# Patient Record
Sex: FEMALE | Race: WHITE | NOT HISPANIC OR LATINO | Employment: FULL TIME | URBAN - METROPOLITAN AREA
[De-identification: names, ages, dates, MRNs, and addresses within clinical notes are randomized per-mention and may not be internally consistent; named-entity substitution may affect disease eponyms.]

---

## 2018-09-27 NOTE — PROGRESS NOTES
"CHIEF COMPLAINT: Type 2 Diabetes     HPI: Mrs. Rayne Aaron is a 53 y.o. female who was diagnosed with Type 2 DM in 2008. Last seen by SCOTT Evans DNP in 2016.  Pt is being seen by me for the first time.    3 deaths in the past few years, stressed, works a lot-wakes up at 5a.  , has children.     Lab Results   Component Value Date    HGBA1C 8.4 (H) 03/30/2016                   PREVIOUS DIABETES MEDICATIONS TRIED  Metformin  levemir or lantus vial  invokana SEs     CURRENT DIABETIC MEDS: metformin 1000 mg bid     Pt is monitoring blood glucose readings 4 times a day.  Needs >100 strips per month related to fluctuations with blood glucose reading, a1c trends, and activity level.    Last Podiatry Exam: > 1 year    REVIEW OF SYSTEMS  General: no weakness, fatigue, or weight changes.   Eyes: no double or blurred vision, eye pain, or redness; Last Eye Exam=x 2 y ears ago. +vertigo  Cardiovascular: no chest pain, palpitations, edema, or murmurs.   Respiratory: no cough or dyspnea.   GI: no heartburn, nausea, or changes in bowel patterns; good appetite.   Skin: no rashes, dryness, itching, or reactions at insulin injection sites.  Neuro: no numbness, tingling, tremors, or vertigo.   Endocrine: no polyuria, polydipsia, polyphagia, heat or cold intolerance.     Vital Signs  /72 (BP Location: Right arm, Patient Position: Sitting, BP Method: Medium (Manual))   Pulse 71   Ht 5' 3" (1.6 m)   Wt 86.9 kg (191 lb 9.3 oz)   LMP 09/08/2015 (Approximate)   BMI 33.94 kg/m²     Hemoglobin A1C   Date Value Ref Range Status   03/30/2016 8.4 (H) 4.5 - 6.2 % Final   09/11/2015 9.5 (H) 4.5 - 6.2 % Final        Chemistry        Component Value Date/Time     05/18/2016 0700    K 4.4 05/18/2016 0700     05/18/2016 0700    CO2 26 05/18/2016 0700    BUN 19 05/18/2016 0700    CREATININE 0.7 05/18/2016 0700     (H) 05/18/2016 0700        Component Value Date/Time    CALCIUM 9.1 05/18/2016 0700    ALKPHOS 75 " 05/18/2016 0700    AST 12 05/18/2016 0700    ALT 11 05/18/2016 0700    BILITOT 0.3 05/18/2016 0700    ESTGFRAFRICA >60.0 05/18/2016 0700    EGFRNONAA >60.0 05/18/2016 0700           Lab Results   Component Value Date    TSH 0.288 (L) 09/10/2015      Lab Results   Component Value Date    CHOL 215 (H) 05/18/2016    CHOL 258 (H) 09/10/2015     Lab Results   Component Value Date    HDL 54 05/18/2016    HDL 47 09/10/2015     Lab Results   Component Value Date    LDLCALC 125.8 05/18/2016    LDLCALC 169.4 (H) 09/10/2015     Lab Results   Component Value Date    TRIG 176 (H) 05/18/2016    TRIG 208 (H) 09/10/2015     Lab Results   Component Value Date    CHOLHDL 25.1 05/18/2016    CHOLHDL 18.2 (L) 09/10/2015         PHYSICAL EXAMINATION  Constitutional: Appears well, no distress  Neck: Supple, trachea midline.   Respiratory: CTA without wheezes, even and unlabored.  Cardiovascular: RRR; no carotid bruits or murmurs; (+) DP pulses; no edema.   Lymph: no lymphadenopathy palpated  Skin: warm and dry; no injection site reactions, no acanthosis nigracans observed.  .............................................................................................................`  Neuro:patient alert and cooperative, normal affect; steady gait.    Diabetes Foot Exam:   Protective Sensation (w/ 10 gram monofilament):  Right: Intact  Left: Intact    Visual Inspection:  Normal -  Bilateral and Dry Skin -  Bilateral    Pedal Pulses:   Right: Present  Left: Present    Posterior tibialis:   Right:Present  Left: Present     (L) decreased vibratory sensation   Intact (R)     Assessment/Plan    1. Type 2 diabetes mellitus with hyperglycemia, without long-term current use of insulin  Hemoglobin A1c next time  a1c goal less than 7%  Discussed complications, medications  AVS- new medications  Start lantus 26 units at night-vial/syringes  humalog 8 units ac w/ mod 150-200+2, etc-gave demonstration of how to use flexpens/pen needles (rx  given)  Printed all rx  trulicity 0.75 mg weekly-no h/o medullary thyroid ca   BG monitoring 4 times a day  Vouchers given        Ambulatory Referral to Diabetes Education-carb counting book     Diabetic Eye Screening Photo  Counseling >40 mins    2. Multinodular goiter  TSH  U/s 2015, no complications   3. Hypertension, uncontrolled  Controlled ,continue med(s)   4. Dyslipidemia  Increase pravastatin 20 mg qhs  Lab Results   Component Value Date    LDLCALC 125.8 05/18/2016     Above goal via labs above in 4/2018.        FOLLOW UP  Follow-up in about 3 months (around 12/28/2018).

## 2018-09-28 ENCOUNTER — OFFICE VISIT (OUTPATIENT)
Dept: ENDOCRINOLOGY | Facility: CLINIC | Age: 53
End: 2018-09-28
Payer: COMMERCIAL

## 2018-09-28 VITALS
WEIGHT: 191.56 LBS | DIASTOLIC BLOOD PRESSURE: 72 MMHG | SYSTOLIC BLOOD PRESSURE: 126 MMHG | HEART RATE: 71 BPM | HEIGHT: 63 IN | BODY MASS INDEX: 33.94 KG/M2

## 2018-09-28 DIAGNOSIS — E11.65 TYPE 2 DIABETES MELLITUS WITH HYPERGLYCEMIA, WITHOUT LONG-TERM CURRENT USE OF INSULIN: Primary | ICD-10-CM

## 2018-09-28 DIAGNOSIS — E04.2 MULTINODULAR GOITER: ICD-10-CM

## 2018-09-28 DIAGNOSIS — E78.5 DYSLIPIDEMIA: ICD-10-CM

## 2018-09-28 DIAGNOSIS — I10 HYPERTENSION, UNCONTROLLED: ICD-10-CM

## 2018-09-28 PROCEDURE — 3008F BODY MASS INDEX DOCD: CPT | Mod: CPTII,S$GLB,, | Performed by: NURSE PRACTITIONER

## 2018-09-28 PROCEDURE — 3078F DIAST BP <80 MM HG: CPT | Mod: CPTII,S$GLB,, | Performed by: NURSE PRACTITIONER

## 2018-09-28 PROCEDURE — 99215 OFFICE O/P EST HI 40 MIN: CPT | Mod: S$GLB,,, | Performed by: NURSE PRACTITIONER

## 2018-09-28 PROCEDURE — 3074F SYST BP LT 130 MM HG: CPT | Mod: CPTII,S$GLB,, | Performed by: NURSE PRACTITIONER

## 2018-09-28 PROCEDURE — 99999 PR PBB SHADOW E&M-EST. PATIENT-LVL V: CPT | Mod: PBBFAC,,, | Performed by: NURSE PRACTITIONER

## 2018-09-28 RX ORDER — MONTELUKAST SODIUM 10 MG/1
10 TABLET ORAL NIGHTLY
COMMUNITY
End: 2021-01-26 | Stop reason: ALTCHOICE

## 2018-09-28 RX ORDER — INSULIN LISPRO 100 [IU]/ML
INJECTION, SOLUTION INTRAVENOUS; SUBCUTANEOUS
Qty: 1 BOX | Refills: 1 | Status: SHIPPED | OUTPATIENT
Start: 2018-09-28 | End: 2019-05-01

## 2018-09-28 RX ORDER — PEN NEEDLE, DIABETIC 30 GX3/16"
NEEDLE, DISPOSABLE MISCELLANEOUS
Qty: 100 EACH | Refills: 6 | Status: SHIPPED | OUTPATIENT
Start: 2018-09-28 | End: 2021-02-01 | Stop reason: SDUPTHER

## 2018-09-28 RX ORDER — TIZANIDINE HYDROCHLORIDE 4 MG/1
1 CAPSULE, GELATIN COATED ORAL
COMMUNITY
End: 2019-05-09 | Stop reason: ALTCHOICE

## 2018-09-28 RX ORDER — PRAVASTATIN SODIUM 20 MG/1
20 TABLET ORAL DAILY
Qty: 90 TABLET | Refills: 3 | Status: ON HOLD | OUTPATIENT
Start: 2018-09-28 | End: 2020-12-30 | Stop reason: HOSPADM

## 2018-09-28 RX ORDER — INSULIN LISPRO 100 [IU]/ML
INJECTION, SOLUTION INTRAVENOUS; SUBCUTANEOUS
Qty: 1 BOX | Refills: 6 | Status: SHIPPED | OUTPATIENT
Start: 2018-09-28 | End: 2018-09-28

## 2018-09-28 NOTE — PATIENT INSTRUCTIONS
Snacks can be an important part of a balanced, healthy meal plan. They allow you to eat more frequently, feeling full and satisfied throughout the day. Also, they allow you to spread carbohydrates evenly, which may stabilize blood sugars.  Plus, snacks are enjoyable!     The amount of carbohydrate needed at snacks varies. Generally, about 15-30 grams of carbohydrate per snack is recommended.  Below you will find some tasty treats.       0-5 gm carb   Crystal Light   Vitamin Water Zero   Herbal tea, unsweetened   2 tsp peanut butter on celery   1./2 cup sugar-free jell-o   1 sugar-free popsicle   ¼ cup blueberries   8oz Blue Debbie unsweetened almond milk   5 baby carrots & celery sticks, cucumbers, bell peppers dipped in ¼ cup salsa, 2Tbsp light ranch dressing or 2Tbsp plain Greek yogurt   10 Goldfish crackers   ½ oz low-fat cheese or string cheese   1 closed handful of nuts, unsalted   1 Tbsp of sunflower seeds, unsalted   1 cup Smart Pop popcorn   1 whole grain brown rice cake        15 gm carb   1 small piece of fruit or ½ banana or 1/2 cup lite canned fruit   3 chris cracker squares   3 cups Smart Pop popcorn, top spray butter, Atkinson lite salt or cinnamon and Truvia   5 Vanilla Wafers   ½ cup low fat, no added sugar ice cream or frozen yogurt (Blue bell, Blue Bunny, Weight Watchers, Skinny Cow)   ½ turkey, ham, or chicken sandwich   ½ c fruit with ½ c Cottage cheese   4-6 unsalted wheat crackers with 1 oz low fat cheese or 1 tbsp peanut butter    30-45 goldfish crackers (depending on flavor)    7-8 Yazidism mini brown rice cakes (caramel, apple cinnamon, chocolate)    12 Yazidism mini brown rice cakes (cheddar, bbq, ranch)    1/3 cup hummus dip with raw veg   1/2 whole wheat mihir, 1Tbsp hummus   Mini Pizza (1/2 whole wheat English muffin, low-fat  cheese, tomato sauce)   100 calorie snack pack (Oreo, Chips Ahoy, Ritz Mix, Baked Cheetos)   4-6 oz. light or Greek Style yogurt  (Tobias, Ervin, Richard, [facility])   ½ cup sugar-free pudding     6 in. wheat tortilla or mihir oven toasted chips (topped with spray butter flavoring, cinnamon, Truvia OR spray butter, garlic powder, chili powder)    18 BBQ Popchips (available at [employer], [employer], Fresh Market)                   Diabetes Support Group Meetings         Date: Topic:   February 8 Health Promotion/Cooking Demo   March 8 Taking Care of Your Kidneys   April 12 Taking Care of Your Feet   May 10 Ease Your Mind with Diabetes   Mee 14 Summer Treats/Cooking Demo   July 12 Super Market Sweep   August 9 Taking Care of Your Eyes   Sept 13 Technology/ADA updates   October 11 Recipes & Treats/Cooking Demo   November 8 Heart Health/Pump it up!   December 13 Year-End Close Out        Meetings are held in the [facility] (A) of the [facility] located at [address] [place] [address]. Please call [phone] for additional information.    Free service, offered every 2nd Thursday of every month! Family members and/or friends are welcome as well!  Support group is for patients with type 1 or type 2 diabetes.    From 3:30p to 4:30p        Using an Injection Pen  Your healthcare provider has prescribed a medicine that you can give yourself using an injection pen. One medicine that is often given with an injection pen is insulin. Injection pens are popular because they are easy to use. Also many people feel more comfortable using something that looks like a pen instead of a syringe. Some kinds of pens you can throw away (disposable). Others should not be thrown away (nondisposable).  Disposable pens come already filled with a set amount of medicine. Once you inject the medicine you throw the pen away. With nondisposable pens, you replace the medicine christensen (cartridge) when it is empty.  Both types of pens use a pen needle. This is screwed onto the tip of the pen before you use it. Pen needles  come in different lengths and thicknesses.  Home care  Follow these steps when using the injection pen. First, get these supplies together:  · Alcohol swabs  · Injector pen  · Cartridge, if the pen is nondisposable  · Special container for the used needles and disposable pens (sharps container). You can buy a sharps container at a pharmacy or medical supply store. You can also use an empty laundry detergent bottle, or any other puncture-proof container and lid.  Then get the pen ready:  · Wash your hands.  · Remove the pen cap.  · Check the medicine to make sure it is the type your healthcare provider prescribed. Make sure that it has not , and is not discolored, frosted, or lumpy. If the medicine doesn't look right to you, dont use it. Get a new cartridge or a new disposable pen. Never share injection pens or medicine cartridges.  · Attach a needle to your pen. Read the directions that came with your pen. They contain steps for attaching a needle. If youre using a nondisposable pen, dont leave the needle attached to the pen between shots.  · Mix the medicine by rolling the pen between the palms of your hands about 20 times. You can also tip the pen back and forth.  Prime your pen and make sure that it is working by doing a test shot into the air. Do this before your actually inject the medicine. Then set the dose.  · Dial the pen to deliver 2 or 3 units of medicine.  · Hold the pen like a pencil, with the needle pointing up.  · Tap the barrel of the pen. This will make sure any air bubbles in the cartridge float to the top of the cartridge.  · Push down firmly on the pen's injector button. This will shoot medicine into the air. You should see a couple of drops of medicine come out of the needle. If nothing comes out, try doing another air shot. If medicine still doesn't come out after a second try, your pen may be low on medicine. Or the needle may not be connected properly. Read the troubleshooting  tips in the directions that came with your pen.  · Set your dose. Dial the pen to deliver the amount of medicine you need to take. As you turn the dial, you should hear a clicking sound. Your pen is now ready to use.  · Choose an injection site. The belly (abdomen), upper arms, thighs, and buttocks are the most common sites to use. Stay away from sites that are close to a mole or scar, or that are closer than 2 inches to your belly button.  · Make sure the site is clean. Clean it with an alcohol swab. Let it dry.  · Pinch up a fold of skin around the site you have selected. Hold it firmly with one hand.  · Hold the injection pen like a pencil in your other hand.  Inject your medicine  Insert the needle into your pinched-up skin. The needle should be straight up and down. Thin adults or children may need to inject with the needle slightly to the left or right.  · Make sure the needle gets all the way into the fatty tissue below the skin.  · Push the pen injection button. Unless you take a very small dose, the injection should take a couple of seconds.  · Let go of the skin and remove the needle from your skin.  After the injection  · For a nondisposable pen, remove the needle by unscrewing it.  · Put any used needles and disposable pens in the sharps container. Make sure that needles point down. Never put your fingers into the container.  · When the sharps container is full, take it back to your healthcare facility. The staff will get rid of it for you.  Follow-up care  Follow up with your healthcare provider, or as advised.  When to seek medical advice   Call your healthcare provider right away if any of these occur:  · Problems that keep you from giving your injection  · Bleeding at the injection site for more than 10 minutes  · Pain at the injection site that does not go away  · You inject the medicine in the wrong area  · You inject too much of the medicine  · Rash at the injection site  · Fever of 100.4°F (38°C)  or higher  · Redness, warmth, swelling, or drainage at the injection site  · Signs of allergic reaction. These include trouble breathing, hives, or a rash.  Date Last Reviewed: 6/1/2016  © 9382-1532 [employer]. [address] [place] [address], [place] [address]. All rights reserved. This information is not intended as a substitute for professional medical care. Always follow your healthcare professional's instructions.        Hypoglycemia (Low Blood Sugar)     Fast-acting sugar includes a cup of nonfat milk.     Too little sugar (glucose) in your blood is called hypoglycemia or low blood sugar. Low blood sugar usually means anything lower than 70 mg/dL. Talk with your healthcare provider about your target range and what level is too low for you. Diabetes itself doesnt cause low blood sugar. But some of the treatments for diabetes, such as pills or insulin, may raise your risk for it. Low blood sugar may cause you to pass out or have a seizure. So always treat low blood sugar right away, but don't overeat.  Special note: Always carry a source of fast-acting sugar and a snack in case of hypoglycemia.   What you may notice  If you have low blood sugar, you may have one or more of these symptoms:  · Shakiness or dizziness  · Cold, clammy skin or sweating  · Feelings of hunger  · Headache  · Nervousness  · A hard, fast heartbeat  · Weakness  · Confusion or irritability  · Blurred vision  · Having nightmares or waking up confused or sweating  · Numbness or tingling in the lips or tongue  What you should do  Here are tips to follow if you have hypoglycemia:   · First check your blood sugar. If it is too low (out of your target range), eat or drink 15 to 20 grams of fast-acting sugar. This may be 3 to 4 glucose tablets, 4 ounces (half a cup) of fruit juice or regular (nondiet) soda, 8 ounces (1 cup) of fat-free milk, or 1 tablespoon of honey. Dont take more than this, or your blood sugar may go too high.  · Wait 15  minutes. Then recheck your blood sugar if you can.  · If your blood sugar is still too low, repeat the steps above and check your blood sugar again. If your blood sugar still has not returned to your target range, contact your healthcare provider or seek emergency care.  · Once your blood sugar returns to target range, eat a snack or meal.  Preventing low blood sugar  Things you can do include the following:   · If your condition needs a strict treatment plan, eat your meals and snacks at the same times each day. Dont skip meals!  · If your treatment plan lets you change when you eat and what you eat, learn how to change the time and dose of your rapid-acting insulin to match this.   · Ask your healthcare provider if it is safe for you to drink alcohol. Never drink on an empty stomach.  · Take your medicine at the prescribed times.  · Always carry a source of fast-acting sugar and a snack when youre away from home.  Other things to do  Additional tips include the following:  · Carry a medical ID card, a compact USB drive, or wear a medical alert bracelet or necklace. It should say that you have diabetes. It should also say what to do if you pass out or have a seizure.  · Make sure your family, friends, and coworkers know the signs of low blood sugar. Tell them what to do if your blood sugar falls very low and you cant treat yourself.  · Keep a glucagon emergency kit handy. Be sure your family, friends, and coworkers know how and when to use it. Check it regularly and replace the glucagon before it expires.  · Talk with your health care team about other things you can do to prevent low blood sugar.     If you have unexplained hypoglycemia or hypoglycemia several times, call your healthcare provider.   Date Last Reviewed: 5/1/2016  © 5470-9293 [employer]. [address] [place] [address], [place] [address]. All rights reserved. This information is not intended as a substitute for professional medical care.  Always follow your healthcare professional's instructions.        Dulaglutide injection  What is this medicine?  DULAGLUTIDE (DOO la GLOO tide) is used to improve blood sugar control in adults with type 2 diabetes. This medicine may be used with other oral diabetes medicines.  How should I use this medicine?  This medicine is for injection under the skin of your upper leg (thigh), stomach area, or upper arm. It is usually given once every week (every 7 days). You will be taught how to prepare and give this medicine. Use exactly as directed. Take your medicine at regular intervals. Do not take it more often than directed.  If you use this medicine with insulin, you should inject this medicine and the insulin separately. Do not mix them together. Do not give the injections right next to each other. Change (rotate) injection sites with each injection.  It is important that you put your used needles and syringes in a special sharps container. Do not put them in a trash can. If you do not have a sharps container, call your pharmacist or healthcare provider to get one.  A special MedGuide will be given to you by the pharmacist with each prescription and refill. Be sure to read this information carefully each time.  Talk to your pediatrician regarding the use of this medicine in children. Special care may be needed.  What side effects may I notice from receiving this medicine?  Side effects that you should report to your doctor or health care professional as soon as possible:  · allergic reactions like skin rash, itching or hives, swelling of the face, lips, or tongue  · breathing problems  · signs and symptoms of low blood sugar such as feeling anxious, confusion, dizziness, increased hunger, unusually weak or tired, sweating, shakiness, cold, irritable, headache, blurred vision, fast heartbeat, loss of consciousness  · unusual stomach upset or pain  · vomiting  Side effects that usually do not require medical attention  (Report these to your doctor or health care professional if they continue or are bothersome.):diarrhea  · heartburn  · loss of appetite  · nausea  · pain, redness, or irritation at site where injected  What may interact with this medicine?  Do not take this medicine with any of the following medications:  · gatifloxacin  Many medications may cause changes in blood sugar, these include:  · alcohol containing beverages  · aspirin and aspirin-like drugs  · chloramphenicol  · chromium  · diuretics  · female hormones, such as estrogens or progestins, birth control pills  · heart medicines  · isoniazid  · male hormones or anabolic steroids  · medications for weight loss  · medicines for allergies, asthma, cold, or cough  · medicines for mental problems  · medicines called MAO inhibitors - Nardil, Parnate, Marplan, Eldepryl  · niacin  · NSAIDS, such as ibuprofen  · pentamidine  · phenytoin  · probenecid  · quinolone antibiotics such as ciprofloxacin, levofloxacin, ofloxacin  · some herbal dietary supplements  · steroid medicines such as prednisone or cortisone  · thyroid hormonesSome medications can hide the warning symptoms of low blood sugar (hypoglycemia). You may need to monitor your blood sugar more closely if you are taking one of these medications. These include:  · beta-blockers, often used for high blood pressure or heart problems (examples include atenolol, metoprolol, propranolol)  · clonidine  · guanethidine  · reserpine  What if I miss a dose?  If you miss a dose, take it as soon as you can within 3 days after the missed dose. Then take your next dose at your regular weekly time. If it has been longer than 3 days after the missed dose, do not take the missed dose. Take the next dose at your regular time. Do not take double or extra doses. If you have questions about a missed dose, contact your health care provider for advice.  Where should I keep my medicine?  Keep out of the reach of children.  Store this  medicine in a refrigerator between 2 and 8 degrees C (36 and 46 degrees F). Do not freeze or use if the medicine has been frozen. Protect from light and excessive heat. Each single-dose pen or prefilled syringe can be kept at room temperature, not to exceed 30 degrees C (86 degrees F) for a total of 14 days, if needed. Store in the carton until use. Throw away any unused medicine after the expiration date.  What should I tell my health care provider before I take this medicine?  They need to know if you have any of these conditions:  · endocrine tumors (MEN 2) or if someone in your family had these tumors  · history of pancreatitis  · kidney disease  · liver disease  · stomach problems  · thyroid cancer or if someone in your family had thyroid cancer  · an unusual or allergic reaction to dulaglutide, other medicines, foods, dyes, or preservatives  · pregnant or trying to get pregnant  · breast-feeding  What should I watch for while using this medicine?  Visit your doctor or health care professional for regular checks on your progress.  A test called the HbA1C (A1C) will be monitored. This is a simple blood test. It measures your blood sugar control over the last 2 to 3 months. You will receive this test every 3 to 6 months.  Learn how to check your blood sugar. Learn the symptoms of low and high blood sugar and how to manage them.  Always carry a quick-source of sugar with you in case you have symptoms of low blood sugar. Examples include hard sugar candy or glucose tablets. Make sure others know that you can choke if you eat or drink when you develop serious symptoms of low blood sugar, such as seizures or unconsciousness. They must get medical help at once.  Tell your doctor or health care professional if you have high blood sugar. You might need to change the dose of your medicine. If you are sick or exercising more than usual, you might need to change the dose of your medicine.  Do not skip meals. Ask your  doctor or health care professional if you should avoid alcohol. Many nonprescription cough and cold products contain sugar or alcohol. These can affect blood sugar.  Wear a medical ID bracelet or chain, and carry a card that describes your disease and details of your medicine and dosage times.  NOTE:This sheet is a summary. It may not cover all possible information. If you have questions about this medicine, talk to your doctor, pharmacist, or health care provider. Copyright© 2017 [employer]        Discharge Instructions: Using Injection Pens    Your healthcare provider has prescribed a medicine that you can give yourself using an injection pen. One medicine that is commonly given with an injection pen is insulin. Injection pens are popular because they are easy to use. Also, many people like how pens look better than syringes.  Injection pens can be disposable or nondisposable:  · Disposable pens come already filled (prefilled) with a set amount of medicine. Once you inject the medicine, you throw the pen away.  · With nondisposable pens, you replace the medicine cartridge when it is empty.  Both types of pens need a pen needle. This is screwed onto the tip of the pen before each injection. Pen needles come in different lengths and thicknesses. Always throw away needles right after you use them. Never reuse needles.  Step 1. Gather your supplies  · Alcohol swabs  · Injector pen  · Pen needle  · Cartridge if pen is the nondisposable type  · Special container to throw out the used needles and disposable pens (sharps container). You can buy a sharps container at a drugstore or medical supply store. You can also use an empty laundry detergent bottle, or any other puncture-proof container and lid.  Step 2. Prepare the pen  Each pen will come with its own special instructions. Read the directions that came with your pen. Discuss the instructions with your diabetes care team or diabetes educator before injecting  insulin. In general here is what to do:  · Wash your hands.  · Remove the pen cap.  · Check the medicine. Make sure it is the type your provider prescribed. Check that it has not . Also check that it's not discolored, frosted, or lumpy. If the medicine doesn't look right to you, dont use it. Get a new cartridge or a new disposable pen. Never share injection pens or medicine cartridges.  · Some medicines need to be mixed. You can do this by rolling the pen between your palms about 20 times. You can also tip the pen back and forth.  · Attach a needle to your pen. Read the directions that came with your pen. They will give you the steps for attaching a needle. If youre using a nondisposable pen, dont leave the needle attached to the pen between shots.  Step 3. Prime the pen and set the dose  Prime your pen and make sure that it's working by doing a trial shot in the air before actually injecting your medicine. Then set the dose.  · Dial the pen to give 2 or 3 units of medicine.  · Hold the pen like a pencil, with the needle pointing up.  · Tap the barrel of the pen. This will make sure that any air bubbles in the cartridge float to the top of the cartridge.  · Push down firmly on the pen's injector button. This will send medicine into the air. You should see a couple of drops of medicine come out of the needle. If nothing comes out, try doing another air shot. If medicine still doesn't come out after a second try, your pen may be low on medicine. Or the needle may not be connected properly. Look at the troubleshooting tips in the directions that came with your pen.  · Set your dose. Dial the pen to give the amount of medicine you need to take. As you turn the dial, you should hear a clicking sound. Your pen is now ready to use.  Step 4. Inject your medicine  · Choose an injection site. The belly (abdomen), upper arms, thighs, and buttocks are the most common sites to use. Don't use sites that are close to a  mole or scar. Make sure sites are more than 2 inches away from your belly button.  · Make sure the site is clean. Clean it with an alcohol swab. Let it dry.  · Pinch up a fold of skin around the site you've picked. Hold it firmly with one hand.  · In your other hand, hold the injection pen like a pencil.  · Put the needle straight into the pinched-up skin. Thin adults or children may need to inject the needle at a slight angle. Your healthcare provider will show you what is best for you.  · Make sure the needle gets all the way into the fatty tissue below the skin.  · Push the pen injection button. Unless you take a very small dose, the injection should take a couple of seconds. You may have to hold the pen in 5 to 10 seconds after injecting the insulin. This will depend on the insulin pen you are using. Carefully follow the instructions that came with your pen. Or follow the advice your diabetes care team or diabetes educator gives you.  · Let go of the skin and remove the needle from your skin.  Step 5. After the injection  · If you are using a nondisposable pen, remove the needle by unscrewing it.  · Put any used needles or disposable pens into the sharps container. Make sure that needles point down. Never put your fingers into the container.  · When the sharps container is full, take it back to your healthcare facility. The staff will get rid of it for you.  Follow-up care  Follow up with your healthcare provider, or as advised.     When to seek medical care   Call your healthcare provider right away if you have any of the following:  · Problems that stop you from giving your injection  · Bleeding at the injection site for more than 10 minutes  · Pain at the injection site that does not go away  · Accidental or improper injection, such as:  ¨ Injecting the medicine in the wrong area  ¨ Injecting too much medicine  · Rash at the injection site  · Fever of 100.4°F (38°C) or higher, or as directed by your  healthcare provider  · Redness, warmth, swelling, or drainage at the injection site  · Signs of allergic reaction. These include trouble breathing, hives, or rash.   Date Last Reviewed: 7/1/2016 © 2000-2017 [employer]. [address] [place] [address], [place] [address]. All rights reserved. This information is not intended as a substitute for professional medical care. Always follow your healthcare professional's instructions.        Insulin Lispro injection  What is this medicine?  INSULIN LISPRO (IN whitley makayla TOVAR sproe) is a human-made form of insulin. This drug lowers the amount of sugar in your blood. This medicine is a rapid-acting insulin that starts working faster than regular insulin. It will not work as long as regular insulin.  How should I use this medicine?  This medicine is for injection under the skin or infusion into a vein. This medicine may be given by health care professional in a hospital or clinic setting. It is important to follow the directions given to you by your health care professional or doctor. You should inject this medicine within 15 minutes before or after your meal. Have food ready before injection. Do not delay eating. You will be taught how to use this medicine and how to adjust doses for activities and illness. Do not use more insulin than prescribed. Do not use more or less often than prescribed.  Always check the appearance of your insulin before using it. This medicine should be clear and colorless like water. Do not use it if it is cloudy, thickened, colored, or has solid particles in it.  It is important that you put your used needles and syringes in a special sharps container. Do not put them in a trash can. If you do not have a sharps container, call your pharmacist or healthcare provider to get one.  Talk to your pediatrician regarding the use of this medicine in children. Special care may be needed.  What side effects may I notice from receiving this medicine?  Side  effects that you should report to your health care professional or doctor as soon as possible:  · allergic reactions like skin rash, itching or hives, swelling of the face, lips, or tongue  · breathing problems  · signs and symptoms of high blood sugar such as dizziness, dry mouth, dry skin, fruity breath, nausea, stomach pain, increased hunger or thirst, increased urination  · signs and symptoms of low blood sugar such as feeling anxious, confusion, dizziness, increased hunger, unusually weak or tired, sweating, shakiness, cold, irritable, headache, blurred vision, fast heartbeat, loss of consciousness  Side effects that usually do not require medical attention (report to your health care professional or doctor if they continue or are bothersome):  · increase or decrease in fatty tissue under the skin due to overuse of a particular injection site  · itching, burning, swelling, or rash at site where injected  What may interact with this medicine?  · other medicines for diabetes  Many medications may cause an increase or decrease in blood sugar, these include:  · alcohol containing beverages  · aspirin and aspirin-like drugs  · chloramphenicol  · chromium  · diuretics  · female hormones, like estrogens or progestins and birth control pills  · heart medicines  · isoniazid  · male hormones or anabolic steroids  · medicines for weight loss  · medicines for allergies, asthma, cold, or cough  · medicines for mental problems  · medicines called MAO Inhibitors like Nardil, Parnate, Marplan, Eldepryl  · niacin  · NSAIDs, medicines for pain and inflammation, like ibuprofen or naproxen  · pentamidine  · phenytoin  · probenecid  · quinolone antibiotics like ciprofloxacin, levofloxacin, ofloxacin  · some herbal dietary supplements  · steroid medicines like prednisone or cortisone  · thyroid medicine  Some medications can hide the warning symptoms of low blood sugar. You may need to monitor your blood sugar more closely if you  are taking one of these medications. These include:  · beta-blockers such as atenolol, metoprolol, propranolol  · clonidine  · guanethidine  · reserpine  What if I miss a dose?  It is important not to miss a dose. Your health care professional or doctor should discuss a plan for missed doses with you. If you do miss a dose, follow their plan. Do not take double doses.  Where should I keep my medicine?  Keep out of the reach of children.  Store unopened insulin vials in a refrigerator between 2 and 8 degrees C (36 and 46 degrees F). Do not freeze or use if the insulin has been frozen. Opened vials (vials currently in use) may be stored in the refrigerator or at room temperature, at approximately 30 degrees C (86 degrees F) or cooler. Keeping your insulin at room temperature decreases the amount of pain during injection. Once opened, your insulin can be used for 28 days. After 28 days, the vial of insulin should be thrown away.  Store unopened cartridges or disposable pens in a refrigerator between 2 and 8 degrees C (36 and 46 degrees F.) Do not freeze or use if the insulin has been frozen. Once opened, the disposable pens and cartridges that are inserted into pens should be kept at room temperature, approximately 30 degrees C (80 degrees F) or cooler. Do not store in the refrigerator. Once opened, the insulin can be used for 28 days. After 28 days, the cartridge or disposable pen should be thrown away.  Protect from light and excessive heat. Throw away any unused medicine after the expiration date or after the specified time for room temperature storage has passed.  What should I tell my health care provider before I take this medicine?  They need to know if you have any of these conditions:  · episodes of hypoglycemia  · kidney disease  · liver disease  · an unusual or allergic reaction to insulin, metacresol, other medicines, foods, dyes, or preservatives  · pregnant or trying to get  pregnant  · breast-feeding  What should I watch for while using this medicine?  Visit your health care professional or doctor for regular checks on your progress.  A test called the HbA1C (A1C) will be monitored. This is a simple blood test. It measures your blood sugar control over the last 2 to 3 months. You will receive this test every 3 to 6 months.  Learn how to check your blood sugar. Learn the symptoms of low and high blood sugar and how to manage them.  Always carry a quick-source of sugar with you in case you have symptoms of low blood sugar. Examples include hard sugar candy or glucose tablets. Make sure others know that you can choke if you eat or drink when you develop serious symptoms of low blood sugar, such as seizures or unconsciousness. They must get medical help at once.  Tell your doctor or health care professional if you have high blood sugar. You might need to change the dose of your medicine. If you are sick or exercising more than usual, you might need to change the dose of your medicine.  Do not skip meals. Ask your doctor or health care professional if you should avoid alcohol. Many nonprescription cough and cold products contain sugar or alcohol. These can affect blood sugar.  Make sure that you have the right kind of syringe for the type of insulin you use. Try not to change the brand and type of insulin or syringe unless your health care professional or doctor tells you to. Switching insulin brand or type can cause dangerously high or low blood sugar. Always keep an extra supply of insulin, syringes, and needles on hand. Use a syringe one time only. Throw away syringe and needle in a closed container to prevent accidental needle sticks.  Insulin pens and cartridges should never be shared. Even if the needle is changed, sharing may result in passing of viruses like hepatitis or HIV.  Wear a medical ID bracelet or chain, and carry a card that describes your disease and details of your  medicine and dosage times.  NOTE:This sheet is a summary. It may not cover all possible information. If you have questions about this medicine, talk to your doctor, pharmacist, or health care provider. Copyright© 2017 [employer]

## 2018-10-15 ENCOUNTER — CLINICAL SUPPORT (OUTPATIENT)
Dept: DIABETES | Facility: CLINIC | Age: 53
End: 2018-10-15
Payer: COMMERCIAL

## 2018-10-15 VITALS — WEIGHT: 189.63 LBS | BODY MASS INDEX: 33.59 KG/M2

## 2018-10-15 DIAGNOSIS — E11.65 TYPE 2 DIABETES MELLITUS WITH HYPERGLYCEMIA, WITHOUT LONG-TERM CURRENT USE OF INSULIN: ICD-10-CM

## 2018-10-15 PROCEDURE — G0108 DIAB MANAGE TRN  PER INDIV: HCPCS | Mod: S$GLB,,, | Performed by: DIETITIAN, REGISTERED

## 2018-10-15 PROCEDURE — 99999 PR PBB SHADOW E&M-EST. PATIENT-LVL I: CPT | Mod: PBBFAC,,, | Performed by: DIETITIAN, REGISTERED

## 2018-10-15 NOTE — PROGRESS NOTES
Diabetes Education  Author: Palmira Ibanez RD  Date: 10/15/2018    Diabetes Care Management Summary  Diabetes Education Record Assessment/Progress: Initial  Current Diabetes Risk Level: High     Last A1c:   Lab Results   Component Value Date    HGBA1C 8.4 (H) 03/30/2016     Last Visit with Diabetes Educator: : 10/15/2018  Last OPCM Referral: : Not Found   Enrolled in OPCM: No      Diabetes Type  Diabetes Type : Type II    Diabetes History  Diabetes Diagnosis: 5-10 years  Current Treatment: Oral Medication, Insulin, Injectable(Metformin, Trulicity 0.75mg weekly, Humalog 8 units + correction 1:25 target 150, Lantus 26 units daily)  Reviewed Problem List with Patient: Yes    Health Maintenance was reviewed today with patient. Discussed with patient importance of routine eye exams, foot exams/foot care, blood work (i.e.: A1c, microalbumin, and lipid), dental visits, yearly flu vaccine, and pneumonia vaccine as indicated by PCP. Patient verbalized understanding.     Health Maintenance Topics with due status: Not Due       Topic Last Completion Date    Colonoscopy 11/17/2015    Low Dose Statin 09/28/2018    Foot Exam 09/28/2018     Health Maintenance Due   Topic Date Due    Hepatitis C Screening  1965    TETANUS VACCINE  01/15/1982    Pneumococcal PPSV23 (Medium Risk) (1) 01/15/1982    Eye Exam  03/12/2016    Hemoglobin A1c  09/30/2016    Urine Microalbumin  10/21/2016    Lipid Panel  05/18/2017    Mammogram  09/29/2017    Influenza Vaccine  08/01/2018    Pap Smear with HPV Cotest  09/14/2018       Nutrition  Meal Planning: skipping meals  What type of beverages do you drink?: diet soda/tea, water  Meal Plan 24 Hour Recall - Breakfast: [employer], iced coffee (homemade with no sugar)  Meal Plan 24 Hour Recall - Lunch: home cooked - lately salad- tomato, mushroom, cucumber, cheese, ranch 2 T., ham or imitation crab, diet coke  Meal Plan 24 Hour Recall - Dinner: didn't eat last night, typically meat,  starch, veggies  Meal Plan 24 Hour Recall - Snack: 2-3 times daily fruit - strawberries and grapes with sugar-free cool whip or sugar free pudding    Monitoring   Self Monitoring : SMBG 4 times daily - see attached log - missing several doses of insulin per week, BGs remaining above goal  Blood Glucose Logs: Yes  Do you use a personal continuous glucose monitor?: No  In the last month, how often have you had a low blood sugar reaction?: never  What are your symptoms of low blood sugar?: felt shaky once last week but did not test  How do you treat low blood sugar?: never had to  Can you tell when your blood sugar is too high?: yes  How do you treat high blood sugar?: take medication, eat less    Exercise   Exercise Type: (active with work but no other exercise)    Current Diabetes Treatment   Current Treatment: Oral Medication, Insulin, Injectable(Metformin, Trulicity 0.75mg weekly, Humalog 8 units + correction 1:25 target 150, Lantus 26 units daily)    Social History  Preferred Learning Method: Face to Face, Reading Materials  Primary Support: Self, Spouse, Daughter  Occupation: office work and   Smoking Status: Never a Smoker  Alcohol Use: Rarely    PHQ-2 Total Score: 0       DDS-2 Score  ( > 3 = SIGNIFICANT DISTRESS): 1.5                   Barriers to Change  Barriers to Change: None  Learning Challenges : None    Readiness to Learn   Readiness to Learn : Acceptance    Cultural Influences  Cultural Influences: No    Diabetes Education Assessment/Progress    Diabetes Disease Process (diabetes disease process and treatment options): Discussion, Individual Session, Demonstrates Understanding/Competency(verbalizes/demonstrates)    Nutrition (Incorporating nutritional management into one's lifestyle): Discussion, Individual Session, Needs Review, Instructed, Written Materials Provided(Has significantly cut down on carbohydrates at meals, sometimes eating meals with no carbs, and snacking on fruit often. This has  "been a big improvement compared to snacking on candy bars. However, discussed given MDI regimen, advised to avoid eating carbs between meals, choose 0-5g CHO snacks instead, and rec'd eating pattern with 30-45g CHO per meal, 3 meals daily. Discussed timing/spacing meals 4-5 hours apart, label reading, choosing healthier options at restaurants, sources of CHO, and portion control.)    Physical Activity (incorporating physical activity into one's lifestyle): Discussion, Needs Review, Individual Session, Instructed, Written Materials Provided(Reports limited time to incorporate exercise but friend has given her a bicycle and she plans to start riding bike after work. Encouraged working up to at least 150 min per week.)    Medications (states correct name, dose, onset, peak, duration, side effects & timing of meds): Discussion, Individual Session, Instructed, Written Materials Provided, Comprehends Key Points(Reports proper injection technique, taking Humalog before meals, adding correction appropriately, and rotating sites around abdomen. Is missing multiple injections weekly r/t busy days. Reinforced importance of not missing doses. Discussed option of VGo to help with taking insulin consistently and faxed benefits investigation form today as pt is interested in this option. Donna George NP is out of office today but will review BG log with her and call pt back with dose adjustments. )    Monitoring (monitoring blood glucose/other parameters & using results): Discussion, Individual Session, Instructed, Comprehends Key Points, Written Materials Provided(Encouraged continuing SMBG 4 times daily AC/HS, reviewed goal BG readings.)    Acute Complications (preventing, detecting, and treating acute complications): Discussion, Individual Session, Needs Review, Instructed, Written Materials Provided(Reviewed causes, s/s and proper treatment of hypoglycemia with "rule of 15." Also discussed basic sick day planning. " )    Chronic Complications (preventing, detecting, and treating chronic complications): Discussion, Individual Session, Instructed, Comprehends Key Points, Written Materials Provided(Due for eye exam. She reports she is scheduling eye exam with her preferred optometrist in next couple of weeks.)    Clinical (diabetes, other pertinent medical history, and relevant comorbidities reviewed during visit): Discussion, Individual Session    Cognitive (knowledge of self-management skills, functional health literacy): Discussion, Individual Session    Psychosocial (emotional response to diabetes): Discussion, Individual Session    Diabetes Distress and Support Systems: Discussion, Individual Session    Behavioral (readiness for change, lifestyle practices, self-care behaviors): Discussion, Individual Session    Goals  Patient has selected/evaluated goals during today's session: Yes, selected  Healthy Eating: Set(Will choose 0-5g CHO snacks and eat 30-45g CHO at meals. )  Start Date: 10/15/18  Target Date: 01/15/19  Physical Activity: Set(Will ride bike at least 2 times weekly. )  Start Date: 10/15/18  Target Date: 01/15/19         Diabetes Care Plan/Intervention  Education Plan/Intervention: Individual Follow-Up DSMT         Today's Self-Management Care Plan was developed with the patient's input and is based on barriers identified during today's assessment.    The long and short-term goals in the care plan were written with the patient/caregiver's input. The patient has agreed to work toward these goals to improve her overall diabetes control.      The patient received a copy of today's self-management plan and verbalized understanding of the care plan, goals, and all of today's instructions.      The patient was encouraged to communicate with her physician and care team regarding her condition(s) and treatment.  I provided the patient with my contact information today and encouraged her to contact me via phone or patient  portal as needed.     Education Units of Time   Time Spent: 60 min

## 2018-10-18 ENCOUNTER — PATIENT OUTREACH (OUTPATIENT)
Dept: DIABETES | Facility: CLINIC | Age: 53
End: 2018-10-18

## 2018-10-18 DIAGNOSIS — E11.65 UNCONTROLLED TYPE 2 DIABETES MELLITUS WITH HYPERGLYCEMIA: Primary | ICD-10-CM

## 2018-10-18 RX ORDER — DULAGLUTIDE 1.5 MG/.5ML
1.5 INJECTION, SOLUTION SUBCUTANEOUS
Qty: 4 SYRINGE | Refills: 6 | Status: SHIPPED | OUTPATIENT
Start: 2018-10-18 | End: 2019-05-09

## 2018-10-18 NOTE — PROGRESS NOTES
Reviewed pt's BG log with Donna George NP. She agrees with increasing Trulicity to 1.5mg dosage - sent new Rx for her to sign and send to pharmacy. Also agrees with VGo insulin deliver device if pt is interested in switching. Called and left detailed voicemail message for pt with my direct call back number.

## 2019-04-24 ENCOUNTER — TELEPHONE (OUTPATIENT)
Dept: ENDOCRINOLOGY | Facility: CLINIC | Age: 54
End: 2019-04-24

## 2019-04-24 NOTE — TELEPHONE ENCOUNTER
----- Message from Disha Guzman MA sent at 4/24/2019  9:35 AM CDT -----  Contact: SELF [phone]      ----- Message -----  From: Nola Chen  Sent: 4/24/2019   9:23 AM  To: , #    She is needing to schedule a follow up appt with a doctor who is in network with her new insurance. She was seeing Donna George but she is no longer in network. Nothing is coming up for any doctor. Please call the pt back to discuss.

## 2019-04-27 ENCOUNTER — LAB VISIT (OUTPATIENT)
Dept: LAB | Facility: HOSPITAL | Age: 54
End: 2019-04-27
Payer: COMMERCIAL

## 2019-04-27 DIAGNOSIS — E11.65 TYPE 2 DIABETES MELLITUS WITH HYPERGLYCEMIA, WITHOUT LONG-TERM CURRENT USE OF INSULIN: ICD-10-CM

## 2019-04-27 DIAGNOSIS — E04.2 MULTINODULAR GOITER: ICD-10-CM

## 2019-04-27 LAB
ESTIMATED AVG GLUCOSE: ABNORMAL MG/DL (ref 68–131)
HBA1C MFR BLD HPLC: >14 % (ref 4–5.6)
TSH SERPL DL<=0.005 MIU/L-ACNC: 1.53 UIU/ML (ref 0.4–4)

## 2019-04-27 PROCEDURE — 84443 ASSAY THYROID STIM HORMONE: CPT

## 2019-04-27 PROCEDURE — 83036 HEMOGLOBIN GLYCOSYLATED A1C: CPT

## 2019-04-27 PROCEDURE — 36415 COLL VENOUS BLD VENIPUNCTURE: CPT

## 2019-04-29 ENCOUNTER — LAB VISIT (OUTPATIENT)
Dept: LAB | Facility: HOSPITAL | Age: 54
End: 2019-04-29
Payer: COMMERCIAL

## 2019-04-29 ENCOUNTER — OFFICE VISIT (OUTPATIENT)
Dept: ENDOCRINOLOGY | Facility: CLINIC | Age: 54
End: 2019-04-29
Payer: COMMERCIAL

## 2019-04-29 VITALS
HEIGHT: 63 IN | BODY MASS INDEX: 33.25 KG/M2 | SYSTOLIC BLOOD PRESSURE: 133 MMHG | WEIGHT: 187.63 LBS | HEART RATE: 83 BPM | DIASTOLIC BLOOD PRESSURE: 74 MMHG

## 2019-04-29 DIAGNOSIS — E78.5 DYSLIPIDEMIA: ICD-10-CM

## 2019-04-29 DIAGNOSIS — E11.65 TYPE 2 DIABETES MELLITUS WITH HYPERGLYCEMIA, WITHOUT LONG-TERM CURRENT USE OF INSULIN: Primary | ICD-10-CM

## 2019-04-29 DIAGNOSIS — E04.2 MULTINODULAR GOITER: ICD-10-CM

## 2019-04-29 DIAGNOSIS — E11.65 TYPE 2 DIABETES MELLITUS WITH HYPERGLYCEMIA, WITHOUT LONG-TERM CURRENT USE OF INSULIN: ICD-10-CM

## 2019-04-29 DIAGNOSIS — E66.09 NON MORBID OBESITY DUE TO EXCESS CALORIES: ICD-10-CM

## 2019-04-29 DIAGNOSIS — I10 HYPERTENSION, UNCONTROLLED: ICD-10-CM

## 2019-04-29 LAB
ALBUMIN SERPL BCP-MCNC: 3.7 G/DL (ref 3.5–5.2)
ALP SERPL-CCNC: 97 U/L (ref 55–135)
ALT SERPL W/O P-5'-P-CCNC: 22 U/L (ref 10–44)
ANION GAP SERPL CALC-SCNC: 10 MMOL/L (ref 8–16)
AST SERPL-CCNC: 12 U/L (ref 10–40)
BILIRUB SERPL-MCNC: 0.3 MG/DL (ref 0.1–1)
BUN SERPL-MCNC: 16 MG/DL (ref 6–20)
CALCIUM SERPL-MCNC: 10.9 MG/DL (ref 8.7–10.5)
CHLORIDE SERPL-SCNC: 98 MMOL/L (ref 95–110)
CHOLEST SERPL-MCNC: 262 MG/DL (ref 120–199)
CHOLEST/HDLC SERPL: 5.2 {RATIO} (ref 2–5)
CO2 SERPL-SCNC: 30 MMOL/L (ref 23–29)
CREAT SERPL-MCNC: 0.8 MG/DL (ref 0.5–1.4)
EST. GFR  (AFRICAN AMERICAN): >60 ML/MIN/1.73 M^2
EST. GFR  (NON AFRICAN AMERICAN): >60 ML/MIN/1.73 M^2
GLUCOSE SERPL-MCNC: 366 MG/DL (ref 70–110)
HDLC SERPL-MCNC: 50 MG/DL (ref 40–75)
HDLC SERPL: 19.1 % (ref 20–50)
LDLC SERPL CALC-MCNC: 171.2 MG/DL (ref 63–159)
NONHDLC SERPL-MCNC: 212 MG/DL
POTASSIUM SERPL-SCNC: 4.3 MMOL/L (ref 3.5–5.1)
PROT SERPL-MCNC: 7.3 G/DL (ref 6–8.4)
SODIUM SERPL-SCNC: 138 MMOL/L (ref 136–145)
TRIGL SERPL-MCNC: 204 MG/DL (ref 30–150)

## 2019-04-29 PROCEDURE — 3078F DIAST BP <80 MM HG: CPT | Mod: CPTII,S$GLB,, | Performed by: NURSE PRACTITIONER

## 2019-04-29 PROCEDURE — 80053 COMPREHEN METABOLIC PANEL: CPT

## 2019-04-29 PROCEDURE — 36415 COLL VENOUS BLD VENIPUNCTURE: CPT

## 2019-04-29 PROCEDURE — 80061 LIPID PANEL: CPT

## 2019-04-29 PROCEDURE — 99999 PR PBB SHADOW E&M-EST. PATIENT-LVL V: ICD-10-PCS | Mod: PBBFAC,,, | Performed by: NURSE PRACTITIONER

## 2019-04-29 PROCEDURE — 3075F PR MOST RECENT SYSTOLIC BLOOD PRESS GE 130-139MM HG: ICD-10-PCS | Mod: CPTII,S$GLB,, | Performed by: NURSE PRACTITIONER

## 2019-04-29 PROCEDURE — 99214 PR OFFICE/OUTPT VISIT, EST, LEVL IV, 30-39 MIN: ICD-10-PCS | Mod: S$GLB,,, | Performed by: NURSE PRACTITIONER

## 2019-04-29 PROCEDURE — 3075F SYST BP GE 130 - 139MM HG: CPT | Mod: CPTII,S$GLB,, | Performed by: NURSE PRACTITIONER

## 2019-04-29 PROCEDURE — 99999 PR PBB SHADOW E&M-EST. PATIENT-LVL V: CPT | Mod: PBBFAC,,, | Performed by: NURSE PRACTITIONER

## 2019-04-29 PROCEDURE — 3008F PR BODY MASS INDEX (BMI) DOCUMENTED: ICD-10-PCS | Mod: CPTII,S$GLB,, | Performed by: NURSE PRACTITIONER

## 2019-04-29 PROCEDURE — 3008F BODY MASS INDEX DOCD: CPT | Mod: CPTII,S$GLB,, | Performed by: NURSE PRACTITIONER

## 2019-04-29 PROCEDURE — 99214 OFFICE O/P EST MOD 30 MIN: CPT | Mod: S$GLB,,, | Performed by: NURSE PRACTITIONER

## 2019-04-29 PROCEDURE — 3078F PR MOST RECENT DIASTOLIC BLOOD PRESSURE < 80 MM HG: ICD-10-PCS | Mod: CPTII,S$GLB,, | Performed by: NURSE PRACTITIONER

## 2019-04-29 RX ORDER — INSULIN ASPART 100 [IU]/ML
INJECTION, SOLUTION INTRAVENOUS; SUBCUTANEOUS
Qty: 20 ML | Refills: 4 | Status: SHIPPED | OUTPATIENT
Start: 2019-04-29 | End: 2019-05-01

## 2019-04-29 NOTE — ASSESSMENT & PLAN NOTE
-- Reviewed goals of therapy are to get the best control we can without hypoglycemia  -- Refer to diabetes education  Medication changes:   Stop: Levemir, Novolog, and Trulicity   Start: V-Go 20; 4 clicks AC  Ozempic 0.25 mg weekly for 4 weeks & then 0.5 mg weekly thereafter.   -- Discussed MOA & SE. Denies personal history of pancreatitis or medullary thyroid cancer.   -- Reviewed patient's current insulin regimen. Clarified proper insulin dose and timing in relation to meals, etc. Insulin injection sites and proper rotation instructed.    -- Advised frequent self blood glucose monitoring.  Patient encouraged to document glucose results and bring them to every clinic visit    -- Hypoglycemia precautions discussed. Instructed on precautions before driving.    -- Call for Bg repeatedly < 90 or > 180.   -- Close adherence to lifestyle changes recommended.   -- Periodic follow ups for eye evaluations, foot care and dental care suggested.  -- Labs today

## 2019-04-29 NOTE — PROGRESS NOTES
Subjective:      Patient ID: Rayne Aaron is a 54 y.o. female.    Chief Complaint:  Diabetes    History of Present Illness  Rayne Aaron presents today for follow up of T2DM. Last seen in 2019.  Seen in conjunction w/ Marcella Rosario NP.  Lab Results   Component Value Date    HGBA1C >14.0 (H) 2019         With regards to the diabetes:    Diagnosed: 2009  Known complications: none      Current regimen:  Levemir 26 U HS  Trulicity 0.75 mg once weekly ()  Novolog 8 U with meals  Metformin 1000 mg BID    Missed doses? Trulicity; Novolog (has been off the 3 months)    Other medications tried:  None    #2 times a day testing  Log reviewed: yes; Oral recall    Fastin's  Evening 500's    Eats 2 meals a day.   Snacks: pretzels, chips  Drinks: diet soda, water, coffee with sugar free creamer     Exercise - tried to stay active     Hypoglycemic event? None   Knows how to correct with 15 grams of carbs- juice, coke, or a peppermint.      Education - last visit: 10/18/2018    Does not have a Medic alert tag      Diabetes Management Status  Statin: Taking  ACE/ARB: Not taking  Screening or Prevention Patient's value Goal Complete/Controlled?   HgA1C Testing and Control   Lab Results   Component Value Date    HGBA1C >14.0 (H) 2019      Annually/Less than 8% Yes   Lipid profile : 2016 Annually No   LDL control Lab Results   Component Value Date    LDLCALC 125.8 2016    Annually/Less than 100 mg/dl  No   Nephropathy screening Lab Results   Component Value Date    LABMICR 9.0 10/21/2015     No results found for: PROTEINUA Annually No   Blood pressure BP Readings from Last 1 Encounters:   19 133/74    Less than 140/90 Yes   Dilated retinal exam : 2015 Annually Yes   Foot exam   : 2018 Annually Yes     Review of Systems   Constitutional: Negative for unexpected weight change.   Eyes: Negative for visual disturbance.   Respiratory: Negative for shortness of breath.   "  Cardiovascular: Negative for chest pain.   Gastrointestinal: Negative for abdominal pain.   Endocrine: Negative for cold intolerance, heat intolerance, polydipsia, polyphagia and polyuria.   Musculoskeletal: Negative for arthralgias.   Skin: Negative for wound.   Neurological: Negative for headaches.   Hematological: Does not bruise/bleed easily.   Psychiatric/Behavioral: Negative for sleep disturbance.     Objective:   Physical Exam   Neck: No thyromegaly present.   Cardiovascular: Normal rate.   No edema present   Pulmonary/Chest: Effort normal.   Abdominal: Soft.   Vitals reviewed.  Appropriate footwear, Foot exam deferred, done 9/28/2018.   injection sites are ok. No lipo hypertropthy or atrophy    /74 (BP Location: Left arm, Patient Position: Sitting, BP Method: Medium (Manual))   Pulse 83   Ht 5' 3" (1.6 m)   Wt 85.1 kg (187 lb 9.8 oz)   LMP 09/08/2015 (Approximate)   BMI 33.23 kg/m²     Lab Review:   Lab Results   Component Value Date    HGBA1C >14.0 (H) 04/27/2019     Lab Results   Component Value Date    CHOL 215 (H) 05/18/2016    HDL 54 05/18/2016    LDLCALC 125.8 05/18/2016    TRIG 176 (H) 05/18/2016    CHOLHDL 25.1 05/18/2016     Lab Results   Component Value Date     05/18/2016    K 4.4 05/18/2016     05/18/2016    CO2 26 05/18/2016     (H) 05/18/2016    BUN 19 05/18/2016    CREATININE 0.7 05/18/2016    CALCIUM 9.1 05/18/2016    PROT 6.6 05/18/2016    ALBUMIN 3.5 05/18/2016    BILITOT 0.3 05/18/2016    ALKPHOS 75 05/18/2016    AST 12 05/18/2016    ALT 11 05/18/2016    ANIONGAP 7 (L) 05/18/2016    ESTGFRAFRICA >60.0 05/18/2016    EGFRNONAA >60.0 05/18/2016    TSH 1.527 04/27/2019       Assessment and Plan     1. Type 2 diabetes mellitus with hyperglycemia, without long-term current use of insulin  Comprehensive metabolic panel    Microalbumin/creatinine urine ratio    Lipid panel    Hemoglobin A1c    Ambulatory Referral to Diabetes Education   2. Dyslipidemia     3. " Hypertension, uncontrolled     4. Non morbid obesity due to excess calories     5. Multinodular goiter  TSH     Type 2 diabetes mellitus with hyperglycemia  -- Reviewed goals of therapy are to get the best control we can without hypoglycemia  -- Refer to diabetes education  Medication changes:   Stop: Levemir, Novolog, and Trulicity   Start: V-Go 20; 4 clicks AC  Ozempic 0.25 mg weekly for 4 weeks & then 0.5 mg weekly thereafter.   -- Discussed MOA & SE. Denies personal history of pancreatitis or medullary thyroid cancer.   -- Reviewed patient's current insulin regimen. Clarified proper insulin dose and timing in relation to meals, etc. Insulin injection sites and proper rotation instructed.    -- Advised frequent self blood glucose monitoring.  Patient encouraged to document glucose results and bring them to every clinic visit    -- Hypoglycemia precautions discussed. Instructed on precautions before driving.    -- Call for Bg repeatedly < 90 or > 180.   -- Close adherence to lifestyle changes recommended.   -- Periodic follow ups for eye evaluations, foot care and dental care suggested.  -- Labs today    Dyslipidemia  Controlled   On statin per ADA recommendations      Hypertension, uncontrolled  -- on ARB  -- Controlled  -- Blood pressure goals discussed with patient      Non morbid obesity due to excess calories  -- encouraged dietary and lifestyle modifications   -- emphasized weight loss goals       Multinodular goiter  -- TSH, future  -- Ultrasound and FNA in 2015; no complications  -- denies symptoms     U/s -thyroid q 2 years.    Follow up in about 3 months (around 7/29/2019).  HA1C and TSH prior to next appt.

## 2019-05-01 ENCOUNTER — PATIENT MESSAGE (OUTPATIENT)
Dept: ENDOCRINOLOGY | Facility: CLINIC | Age: 54
End: 2019-05-01

## 2019-05-01 ENCOUNTER — TELEPHONE (OUTPATIENT)
Dept: ENDOCRINOLOGY | Facility: CLINIC | Age: 54
End: 2019-05-01

## 2019-05-01 RX ORDER — INSULIN ASPART 100 [IU]/ML
INJECTION, SOLUTION INTRAVENOUS; SUBCUTANEOUS
Qty: 10 ML | Refills: 4 | Status: SHIPPED | OUTPATIENT
Start: 2019-05-01 | End: 2019-05-09

## 2019-05-06 ENCOUNTER — PATIENT MESSAGE (OUTPATIENT)
Dept: ENDOCRINOLOGY | Facility: CLINIC | Age: 54
End: 2019-05-06

## 2019-05-06 DIAGNOSIS — F32.A DEPRESSION: ICD-10-CM

## 2019-05-06 DIAGNOSIS — E11.9 TYPE 2 DIABETES MELLITUS WITHOUT COMPLICATION: ICD-10-CM

## 2019-05-06 DIAGNOSIS — I15.0 RENOVASCULAR HYPERTENSION: ICD-10-CM

## 2019-05-06 RX ORDER — LOSARTAN POTASSIUM 100 MG/1
100 TABLET ORAL DAILY
Qty: 90 TABLET | Refills: 3 | Status: SHIPPED | OUTPATIENT
Start: 2019-05-06 | End: 2020-09-22 | Stop reason: SDUPTHER

## 2019-05-06 RX ORDER — METFORMIN HYDROCHLORIDE 1000 MG/1
1000 TABLET ORAL 2 TIMES DAILY WITH MEALS
Qty: 180 TABLET | Refills: 3 | Status: SHIPPED | OUTPATIENT
Start: 2019-05-06 | End: 2020-09-22 | Stop reason: SDUPTHER

## 2019-05-06 RX ORDER — SERTRALINE HYDROCHLORIDE 50 MG/1
50 TABLET, FILM COATED ORAL DAILY
Qty: 90 TABLET | Refills: 3 | Status: SHIPPED | OUTPATIENT
Start: 2019-05-06 | End: 2020-09-22 | Stop reason: SDUPTHER

## 2019-05-09 ENCOUNTER — LAB VISIT (OUTPATIENT)
Dept: LAB | Facility: HOSPITAL | Age: 54
End: 2019-05-09
Attending: HOSPITALIST
Payer: COMMERCIAL

## 2019-05-09 ENCOUNTER — OFFICE VISIT (OUTPATIENT)
Dept: INTERNAL MEDICINE | Facility: CLINIC | Age: 54
End: 2019-05-09
Payer: COMMERCIAL

## 2019-05-09 VITALS
BODY MASS INDEX: 33.2 KG/M2 | TEMPERATURE: 98 F | OXYGEN SATURATION: 98 % | HEIGHT: 63 IN | DIASTOLIC BLOOD PRESSURE: 94 MMHG | WEIGHT: 187.38 LBS | HEART RATE: 87 BPM | SYSTOLIC BLOOD PRESSURE: 146 MMHG | RESPIRATION RATE: 18 BRPM

## 2019-05-09 DIAGNOSIS — Z12.12 SCREENING FOR COLORECTAL CANCER: ICD-10-CM

## 2019-05-09 DIAGNOSIS — Z12.4 CERVICAL CANCER SCREENING: ICD-10-CM

## 2019-05-09 DIAGNOSIS — Z00.00 ANNUAL PHYSICAL EXAM: ICD-10-CM

## 2019-05-09 DIAGNOSIS — F32.A DEPRESSION, UNSPECIFIED DEPRESSION TYPE: ICD-10-CM

## 2019-05-09 DIAGNOSIS — Z00.00 ANNUAL PHYSICAL EXAM: Primary | ICD-10-CM

## 2019-05-09 DIAGNOSIS — I10 ESSENTIAL HYPERTENSION: ICD-10-CM

## 2019-05-09 DIAGNOSIS — Z79.4 TYPE 2 DIABETES MELLITUS WITH HYPERGLYCEMIA, WITH LONG-TERM CURRENT USE OF INSULIN: ICD-10-CM

## 2019-05-09 DIAGNOSIS — Z12.39 BREAST CANCER SCREENING: ICD-10-CM

## 2019-05-09 DIAGNOSIS — Z12.11 SCREENING FOR COLORECTAL CANCER: ICD-10-CM

## 2019-05-09 DIAGNOSIS — E11.65 TYPE 2 DIABETES MELLITUS WITH HYPERGLYCEMIA, WITH LONG-TERM CURRENT USE OF INSULIN: ICD-10-CM

## 2019-05-09 LAB
25(OH)D3+25(OH)D2 SERPL-MCNC: 27 NG/ML (ref 30–96)
TSH SERPL DL<=0.005 MIU/L-ACNC: 1.38 UIU/ML (ref 0.4–4)

## 2019-05-09 PROCEDURE — 86803 HEPATITIS C AB TEST: CPT

## 2019-05-09 PROCEDURE — 90472 IMMUNIZATION ADMIN EACH ADD: CPT | Mod: S$GLB,,, | Performed by: HOSPITALIST

## 2019-05-09 PROCEDURE — 99386 PREV VISIT NEW AGE 40-64: CPT | Mod: 25,S$GLB,, | Performed by: HOSPITALIST

## 2019-05-09 PROCEDURE — 90472 PNEUMOCOCCAL POLYSACCHARIDE VACCINE 23-VALENT =>2YO SQ IM: ICD-10-PCS | Mod: S$GLB,,, | Performed by: HOSPITALIST

## 2019-05-09 PROCEDURE — 90471 IMMUNIZATION ADMIN: CPT | Mod: S$GLB,,, | Performed by: HOSPITALIST

## 2019-05-09 PROCEDURE — 90732 PPSV23 VACC 2 YRS+ SUBQ/IM: CPT | Mod: S$GLB,,, | Performed by: HOSPITALIST

## 2019-05-09 PROCEDURE — 3077F SYST BP >= 140 MM HG: CPT | Mod: CPTII,S$GLB,, | Performed by: HOSPITALIST

## 2019-05-09 PROCEDURE — 84443 ASSAY THYROID STIM HORMONE: CPT

## 2019-05-09 PROCEDURE — 90471 TD VACCINE GREATER THAN OR EQUAL TO 7YO PRESERVATIVE FREE IM: ICD-10-PCS | Mod: S$GLB,,, | Performed by: HOSPITALIST

## 2019-05-09 PROCEDURE — 3080F DIAST BP >= 90 MM HG: CPT | Mod: CPTII,S$GLB,, | Performed by: HOSPITALIST

## 2019-05-09 PROCEDURE — 99999 PR PBB SHADOW E&M-EST. PATIENT-LVL V: CPT | Mod: PBBFAC,,, | Performed by: HOSPITALIST

## 2019-05-09 PROCEDURE — 90714 TD VACCINE GREATER THAN OR EQUAL TO 7YO PRESERVATIVE FREE IM: ICD-10-PCS | Mod: S$GLB,,, | Performed by: HOSPITALIST

## 2019-05-09 PROCEDURE — 3077F PR MOST RECENT SYSTOLIC BLOOD PRESSURE >= 140 MM HG: ICD-10-PCS | Mod: CPTII,S$GLB,, | Performed by: HOSPITALIST

## 2019-05-09 PROCEDURE — 3080F PR MOST RECENT DIASTOLIC BLOOD PRESSURE >= 90 MM HG: ICD-10-PCS | Mod: CPTII,S$GLB,, | Performed by: HOSPITALIST

## 2019-05-09 PROCEDURE — 36415 COLL VENOUS BLD VENIPUNCTURE: CPT | Mod: PO

## 2019-05-09 PROCEDURE — 82306 VITAMIN D 25 HYDROXY: CPT

## 2019-05-09 PROCEDURE — 90714 TD VACC NO PRESV 7 YRS+ IM: CPT | Mod: S$GLB,,, | Performed by: HOSPITALIST

## 2019-05-09 PROCEDURE — 99386 PR PREVENTIVE VISIT,NEW,40-64: ICD-10-PCS | Mod: 25,S$GLB,, | Performed by: HOSPITALIST

## 2019-05-09 PROCEDURE — 86703 HIV-1/HIV-2 1 RESULT ANTBDY: CPT

## 2019-05-09 PROCEDURE — 90732 PNEUMOCOCCAL POLYSACCHARIDE VACCINE 23-VALENT =>2YO SQ IM: ICD-10-PCS | Mod: S$GLB,,, | Performed by: HOSPITALIST

## 2019-05-09 PROCEDURE — 99999 PR PBB SHADOW E&M-EST. PATIENT-LVL V: ICD-10-PCS | Mod: PBBFAC,,, | Performed by: HOSPITALIST

## 2019-05-09 NOTE — PROGRESS NOTES
Subjective:     @Patient ID: Rayne Aaron is a 54 y.o. female.    Chief Complaint: Annual Exam (had labs done recently); Establish Care; and Knee Pain (right)    HPI    54 y.o. female here for annual exam and to establish care. Pt is new to me. Has not seen a Pcp in awhile. She is  has 2 adult children. Works for a local company as a  and . Pt tearful in clinic today. Reports she has been under a lot of stress lately with her job and also reports sad mood as had a lot of death in her family including parent, nephew and other friends/relatives over the past 2 years. Denies SI. But does state she feels depressed and stressed. Prefers to defer psychiatry referral. To resume zoloft soon. She reports she will be moving in with her son soon into a new home. States she continues to work on a healthy diet and is planning to start exercising again for weight loss.     Lipid disorders/ASCVD risk (ages >/= 45 or >/= 20 if increased risk ): done  DM (>45y yearly or if obese, HTN): A1c done  Hepatitis C (one time if born between 2089-6412): ordered  Eye exam:   Breast Cancer (40-50y discretion of pt, 50-74y every 1-2 years): Mammogram ordered  Cervical Cancer (Pap Smear ages 21-65 every 3 years or Pap + HPV q5 years after 30 years of age):  Needs ob/gyn appt   Colorectal Cancer (normal risk 50-75yr): Colonoscopy. Declines c scope but is ok to do FitKit for now.     Vaccines:   Influenza (yearly):   Tetanus (every 10 yrs - 1st tdap): due   PPSV23(>64yo or <65 w/ lung dz, smoking, DM): due      Exercise: none  Diet: regular/diabetic     Some of ROS filled out by patient prior to visit    Review of Systems   Constitutional: Positive for activity change and unexpected weight change.   HENT: Negative for congestion and trouble swallowing.    Eyes: Negative for pain and discharge.   Respiratory: Negative for shortness of breath and wheezing.    Cardiovascular: Negative for chest pain and  "palpitations.   Gastrointestinal: Negative for constipation, diarrhea and vomiting.   Endocrine: Negative for polydipsia and polyuria.   Genitourinary: Negative for difficulty urinating, dysuria, hematuria and menstrual problem.   Musculoskeletal: Positive for arthralgias and back pain.   Skin: Negative for rash and wound.   Neurological: Positive for headaches. Negative for light-headedness.   Psychiatric/Behavioral: Positive for dysphoric mood. Negative for suicidal ideas.     Past medical history, surgical history, and family medical history reviewed and updated as appropriate.    Medications and allergies reviewed.     Objective:     Vitals:    05/09/19 1042 05/09/19 1120   BP: (!) 146/105 (!) 146/94   BP Location: Right arm    Patient Position: Sitting    BP Method: Large (Automatic)    Pulse: 87    Resp: 18    Temp: 98.3 °F (36.8 °C)    TempSrc: Oral    SpO2: 98%    Weight: 85 kg (187 lb 6.3 oz)    Height: 5' 3" (1.6 m)      Body mass index is 33.19 kg/m².  Physical Exam   Constitutional: She is oriented to person, place, and time. She appears well-developed and well-nourished. No distress.   HENT:   Head: Normocephalic and atraumatic.   Mouth/Throat: Oropharynx is clear and moist. No oropharyngeal exudate.   Eyes: Conjunctivae are normal. Right eye exhibits no discharge. Left eye exhibits no discharge.   Neck: Normal range of motion. Neck supple. No thyromegaly present.   Cardiovascular: Normal rate, regular rhythm and intact distal pulses. Exam reveals no friction rub.   No murmur heard.  Pulmonary/Chest: Effort normal and breath sounds normal.   Abdominal: Soft. Bowel sounds are normal. She exhibits no distension. There is no tenderness. There is no guarding.   Musculoskeletal: Normal range of motion. She exhibits no edema.   Lymphadenopathy:     She has no cervical adenopathy.   Neurological: She is alert and oriented to person, place, and time.   Skin: Skin is warm and dry.   Psychiatric: She has a normal " mood and affect. Her behavior is normal.   Vitals reviewed.      Lab Results   Component Value Date    WBC 5.14 09/11/2015    HGB 14.2 09/11/2015    HCT 42.0 09/11/2015     09/11/2015    CHOL 262 (H) 04/29/2019    TRIG 204 (H) 04/29/2019    HDL 50 04/29/2019    ALT 22 04/29/2019    AST 12 04/29/2019     04/29/2019    K 4.3 04/29/2019    CL 98 04/29/2019    CREATININE 0.8 04/29/2019    BUN 16 04/29/2019    CO2 30 (H) 04/29/2019    TSH 1.527 04/27/2019    HGBA1C >14.0 (H) 04/27/2019       Assessment:     1. Annual physical exam    2. Type 2 diabetes mellitus with hyperglycemia, with long-term current use of insulin    3. Essential hypertension    4. Breast cancer screening    5. Cervical cancer screening    6. Screening for colorectal cancer    7. Depression, unspecified depression type      Plan:   Rayne was seen today for annual exam, establish care and knee pain.    Diagnoses and all orders for this visit:    Annual physical exam  - Encouraged healthy diet and exercise  -     Mammo Digital Screening Bilat w/ Mack; Future  -     Ambulatory Referral to Obstetrics / Gynecology  -     Hepatitis C antibody; Future  -     HIV 1/2 Ag/Ab (4th Gen); Future  -     (In Office Administered) Td Vaccine - Preservative Free  -     (In Office Administered) Pneumococcal Polysaccharide Vaccine (23 Valent) (SQ/IM)  -     Vitamin D; Future  -     Fecal Immunochemical Test (iFOBT); Future    Type 2 diabetes mellitus with hyperglycemia, with long-term current use of insulin  -     (In Office Administered) Td Vaccine - Preservative Free  -     (In Office Administered) Pneumococcal Polysaccharide Vaccine (23 Valent) (SQ/IM)    Essential hypertension  - BP elevated, likely due to patient feeling stress today. Continue home bp meds  -     TSH; Future    Breast cancer screening  -     Mammo Digital Screening Bilat w/ Mack; Future    Cervical cancer screening  -     Ambulatory Referral to Obstetrics / Gynecology    Screening for  colorectal cancer  -     Fecal Immunochemical Test (iFOBT); Future    Depression        - Pt to restart zoloft. Defers pscyh referral at this time      Follow up in about 6 months (around 11/9/2019), or if symptoms worsen or fail to improve.    Deb Ware MD  Internal Medicine    5/9/2019

## 2019-05-10 ENCOUNTER — PATIENT MESSAGE (OUTPATIENT)
Dept: INTERNAL MEDICINE | Facility: CLINIC | Age: 54
End: 2019-05-10

## 2019-05-10 LAB
HCV AB SERPL QL IA: NEGATIVE
HIV 1+2 AB+HIV1 P24 AG SERPL QL IA: NEGATIVE

## 2019-05-10 NOTE — LETTER
May 13, 2019    Rayne Aaron  [address] [place], [place] [address] [address]             [facility]                                                                                                                                                       [address] [facility] [place] [address]  Phone: [phone]  Fax: [phone] Dear Mrs. Aaron:    Your vitamin D level is mildly low. Vitamin D is important for healthy bones and calcium absorption. I recommend a supplement of vitamin D3 800 or 1000 I.u daily. You can find this over the counter at your local pharmacy. Your thyroid (TSH) returned normal.  Also hepatitis C and HIV returned negative.        If you have any questions or concerns, please don't hesitate to call.    Sincerely, Dr. Chaz John, WENDYN

## 2019-05-13 ENCOUNTER — PATIENT MESSAGE (OUTPATIENT)
Dept: INTERNAL MEDICINE | Facility: CLINIC | Age: 54
End: 2019-05-13

## 2019-05-14 ENCOUNTER — HOSPITAL ENCOUNTER (OUTPATIENT)
Dept: RADIOLOGY | Facility: HOSPITAL | Age: 54
Discharge: HOME | End: 2019-05-14
Attending: HOSPITALIST
Payer: COMMERCIAL

## 2019-05-14 DIAGNOSIS — Z00.00 ANNUAL PHYSICAL EXAM: ICD-10-CM

## 2019-05-14 DIAGNOSIS — Z12.39 BREAST CANCER SCREENING: ICD-10-CM

## 2019-05-14 PROCEDURE — 77067 SCR MAMMO BI INCL CAD: CPT | Mod: 26,,, | Performed by: RADIOLOGY

## 2019-05-14 PROCEDURE — 77063 BREAST TOMOSYNTHESIS BI: CPT | Mod: 26,,, | Performed by: RADIOLOGY

## 2019-05-14 PROCEDURE — 77067 SCR MAMMO BI INCL CAD: CPT | Mod: TC,PO

## 2019-05-14 PROCEDURE — 77067 MAMMO DIGITAL SCREENING BILAT WITH TOMOSYNTHESIS_CAD: ICD-10-PCS | Mod: 26,,, | Performed by: RADIOLOGY

## 2019-05-14 PROCEDURE — 77063 MAMMO DIGITAL SCREENING BILAT WITH TOMOSYNTHESIS_CAD: ICD-10-PCS | Mod: 26,,, | Performed by: RADIOLOGY

## 2019-05-16 ENCOUNTER — PATIENT MESSAGE (OUTPATIENT)
Dept: INTERNAL MEDICINE | Facility: CLINIC | Age: 54
End: 2019-05-16

## 2019-05-16 ENCOUNTER — PATIENT MESSAGE (OUTPATIENT)
Dept: ADMINISTRATIVE | Facility: HOSPITAL | Age: 54
End: 2019-05-16

## 2020-04-08 ENCOUNTER — OFFICE VISIT (OUTPATIENT)
Dept: INTERNAL MEDICINE | Facility: CLINIC | Age: 55
End: 2020-04-08
Payer: COMMERCIAL

## 2020-04-08 ENCOUNTER — HOSPITAL ENCOUNTER (OUTPATIENT)
Dept: RADIOLOGY | Facility: HOSPITAL | Age: 55
Discharge: HOME | End: 2020-04-08
Attending: HOSPITALIST
Payer: COMMERCIAL

## 2020-04-08 ENCOUNTER — PATIENT MESSAGE (OUTPATIENT)
Dept: SPORTS MEDICINE | Facility: CLINIC | Age: 55
End: 2020-04-08

## 2020-04-08 VITALS
RESPIRATION RATE: 16 BRPM | OXYGEN SATURATION: 96 % | HEART RATE: 95 BPM | DIASTOLIC BLOOD PRESSURE: 84 MMHG | SYSTOLIC BLOOD PRESSURE: 130 MMHG | WEIGHT: 180.13 LBS | TEMPERATURE: 98 F | HEIGHT: 63 IN | BODY MASS INDEX: 31.92 KG/M2

## 2020-04-08 DIAGNOSIS — M25.512 ACUTE PAIN OF LEFT SHOULDER: ICD-10-CM

## 2020-04-08 DIAGNOSIS — S46.012A ROTATOR CUFF STRAIN, LEFT, INITIAL ENCOUNTER: ICD-10-CM

## 2020-04-08 DIAGNOSIS — Z79.4 TYPE 2 DIABETES MELLITUS WITH HYPERGLYCEMIA, WITH LONG-TERM CURRENT USE OF INSULIN: ICD-10-CM

## 2020-04-08 DIAGNOSIS — E11.65 TYPE 2 DIABETES MELLITUS WITH HYPERGLYCEMIA, WITH LONG-TERM CURRENT USE OF INSULIN: ICD-10-CM

## 2020-04-08 DIAGNOSIS — M25.512 ACUTE PAIN OF LEFT SHOULDER: Primary | ICD-10-CM

## 2020-04-08 PROCEDURE — 3008F BODY MASS INDEX DOCD: CPT | Mod: CPTII,S$GLB,, | Performed by: HOSPITALIST

## 2020-04-08 PROCEDURE — 73030 X-RAY EXAM OF SHOULDER: CPT | Mod: TC,PO,LT

## 2020-04-08 PROCEDURE — 3079F PR MOST RECENT DIASTOLIC BLOOD PRESSURE 80-89 MM HG: ICD-10-PCS | Mod: CPTII,S$GLB,, | Performed by: HOSPITALIST

## 2020-04-08 PROCEDURE — 3075F PR MOST RECENT SYSTOLIC BLOOD PRESS GE 130-139MM HG: ICD-10-PCS | Mod: CPTII,S$GLB,, | Performed by: HOSPITALIST

## 2020-04-08 PROCEDURE — 99213 PR OFFICE/OUTPT VISIT, EST, LEVL III, 20-29 MIN: ICD-10-PCS | Mod: S$GLB,,, | Performed by: HOSPITALIST

## 2020-04-08 PROCEDURE — 3008F PR BODY MASS INDEX (BMI) DOCUMENTED: ICD-10-PCS | Mod: CPTII,S$GLB,, | Performed by: HOSPITALIST

## 2020-04-08 PROCEDURE — 99213 OFFICE O/P EST LOW 20 MIN: CPT | Mod: S$GLB,,, | Performed by: HOSPITALIST

## 2020-04-08 PROCEDURE — 73030 X-RAY EXAM OF SHOULDER: CPT | Mod: 26,LT,, | Performed by: RADIOLOGY

## 2020-04-08 PROCEDURE — 73030 XR SHOULDER TRAUMA 3 VIEW LEFT: ICD-10-PCS | Mod: 26,LT,, | Performed by: RADIOLOGY

## 2020-04-08 PROCEDURE — 99999 PR PBB SHADOW E&M-EST. PATIENT-LVL IV: ICD-10-PCS | Mod: PBBFAC,,, | Performed by: HOSPITALIST

## 2020-04-08 PROCEDURE — 3075F SYST BP GE 130 - 139MM HG: CPT | Mod: CPTII,S$GLB,, | Performed by: HOSPITALIST

## 2020-04-08 PROCEDURE — 99999 PR PBB SHADOW E&M-EST. PATIENT-LVL IV: CPT | Mod: PBBFAC,,, | Performed by: HOSPITALIST

## 2020-04-08 PROCEDURE — 3079F DIAST BP 80-89 MM HG: CPT | Mod: CPTII,S$GLB,, | Performed by: HOSPITALIST

## 2020-04-08 RX ORDER — IBUPROFEN 800 MG/1
800 TABLET ORAL 3 TIMES DAILY PRN
Qty: 30 TABLET | Refills: 0 | Status: SHIPPED | OUTPATIENT
Start: 2020-04-08 | End: 2021-01-26 | Stop reason: ALTCHOICE

## 2020-04-08 NOTE — PROGRESS NOTES
"Subjective:     @Patient ID: Rayne Aaron is a 55 y.o. female.    Chief Complaint: Arm Pain (left)    HPI    54 yo F presents for urgent care for intermittent L arm/shoulder pain  x3 months. Reports worsening over the past few weeks now.   Reports it radiates down her arm. Has hard time putting on her bra and painful when lifting her arm. Sharp, throbbing pain  No falls/injuries  Denies carrying heavy objects.   Works as an .   Tried help tylenol arthritis that has helped a little     She also reports runny nose, and mild eye drainage. She attributes this to her sinuses  No fevers    Review of Systems   Constitutional: Negative for activity change and unexpected weight change.   HENT: Positive for rhinorrhea. Negative for hearing loss and trouble swallowing.    Eyes: Positive for discharge. Negative for visual disturbance.   Respiratory: Negative for chest tightness and wheezing.    Cardiovascular: Negative for chest pain and palpitations.   Gastrointestinal: Negative for blood in stool, constipation, diarrhea and vomiting.   Endocrine: Negative for polydipsia and polyuria.   Genitourinary: Negative for difficulty urinating, dysuria, hematuria and menstrual problem.   Musculoskeletal: Positive for arthralgias and neck pain. Negative for joint swelling.   Neurological: Positive for headaches. Negative for weakness.   Psychiatric/Behavioral: Positive for dysphoric mood. Negative for confusion.     Past medical history, surgical history, and family medical history reviewed and updated as appropriate.    Medications and allergies reviewed.     Objective:     Vitals:    04/08/20 1035   BP: 130/84   BP Location: Right arm   Patient Position: Sitting   BP Method: Large (Manual)   Pulse: 95   Resp: 16   Temp: 97.8 °F (36.6 °C)   TempSrc: Oral   SpO2: 96%   Weight: 81.7 kg (180 lb 1.9 oz)   Height: 5' 3" (1.6 m)     Body mass index is 31.91 kg/m².  Physical Exam   Constitutional: She is oriented to " person, place, and time. She appears well-developed and well-nourished. No distress.   HENT:   Head: Normocephalic and atraumatic.   Mouth/Throat: Oropharynx is clear and moist. No oropharyngeal exudate.   Eyes: Conjunctivae are normal. Right eye exhibits no discharge. Left eye exhibits no discharge.   Neck: Normal range of motion. Neck supple.   Cardiovascular: Normal rate, regular rhythm and intact distal pulses. Exam reveals no friction rub.   No murmur heard.  Pulmonary/Chest: Effort normal and breath sounds normal.   Musculoskeletal: She exhibits tenderness.   +tenderness of left shoulder on palpation and with extension/ROM  5/5 power of RUE   4/5 power of LUE due to pain    Neurological: She is alert and oriented to person, place, and time.   Skin: Skin is warm and dry.   Psychiatric: She has a normal mood and affect. Her behavior is normal.   Vitals reviewed.      Lab Results   Component Value Date    WBC 5.14 09/11/2015    HGB 14.2 09/11/2015    HCT 42.0 09/11/2015     09/11/2015    CHOL 262 (H) 04/29/2019    TRIG 204 (H) 04/29/2019    HDL 50 04/29/2019    ALT 22 04/29/2019    AST 12 04/29/2019     04/29/2019    K 4.3 04/29/2019    CL 98 04/29/2019    CREATININE 0.8 04/29/2019    BUN 16 04/29/2019    CO2 30 (H) 04/29/2019    TSH 1.375 05/09/2019    HGBA1C >14.0 (H) 04/27/2019       Assessment:     1. Acute pain of left shoulder    2. Rotator cuff strain, left, initial encounter    3. Type 2 diabetes mellitus with hyperglycemia, with long-term current use of insulin      Plan:   Rayne was seen today for arm pain.    Diagnoses and all orders for this visit:    Acute pain of left shoulder  Rotator cuff strain, left, initial encounter  - I suspect rotator cuff issue. Will give trial of NSAIDs. Unable to give steroids due to patient's diabetes. Xray today and urgent referral to ortho  -     X-Ray Shoulder Trauma 3 view Left; Future  -     Ambulatory referral/consult to Orthopedics; Future  -      ibuprofen (ADVIL,MOTRIN) 800 MG tablet; Take 1 tablet (800 mg total) by mouth 3 (three) times daily as needed for Pain.    DM2         - Pt has uncontrolled dm2. She does follow with endocrine. Will have repeat a1c coming up soon with annual labs.       Deb Ware MD  Internal Medicine    4/8/2020

## 2020-04-08 NOTE — PROGRESS NOTES
Telemedicine/Virtual Visit Documentation:     The patient location is: home    The chief complaint leading to consultation is: see HPI    VISIT TYPE X   Virtual visit with synchronous audio and video x   Telephone E/M service      Total time spent with patient: see X juan on chart below.   More than half of the time was spent counseling or coordinating care including prognosis, differential diagnosis, risks and benefits of treatment, instructions, compliance risk reductions     EST MINUTES X   [id number] 5    29260 10    13126 15    28285 25    57275 40    NEW     30676 10 x   99202 20    95531 30    15209 45    16436 60    PHONE      5-10    83789 11-20    63471 21-30      H&P  Orthopaedics    SUBJECTIVE:     History of Present Illness:  Patient is a 55 y.o. female with with L shoulder pain. Pain has been present for 3 months and is worsening. No inciting trauma or injury. Otherwise well. Pain is located mostly laterally. Radiating to the elbow but not past. No numbness or tingling or neck pain. She has not had shoulder issues before. Has not had any treatment thus far. Pain is worse overhead and is bothering her at night.      Review of patient's allergies indicates:   Allergen Reactions    Penicillins Other (See Comments)     Facial numbness    Sulfa (sulfonamide antibiotics) Rash       Past Medical History:   Diagnosis Date    Diabetes mellitus, type 2     Hyperlipidemia     Hypertension     Multinodular goiter 10/21/2015    Subclinical hyperthyroidism 10/21/2015     No past surgical history on file.  Family History   Problem Relation Age of Onset    Dementia Mother     Heart disease Mother     Hypertension Mother     Heart disease Father     Diabetes Father     Diabetes Maternal Grandmother     Hypertension Maternal Grandmother     Ovarian cancer Maternal Grandmother     Depression Maternal Grandfather     Diabetes Paternal Grandmother     Peripheral vascular disease Paternal Grandmother      Hyperthyroidism Sister     Breast cancer Neg Hx     Colon cancer Neg Hx     Thyroid cancer Neg Hx      Social History     Tobacco Use    Smoking status: Never Smoker    Smokeless tobacco: Never Used   Substance Use Topics    Alcohol use: Yes     Alcohol/week: 0.0 standard drinks     Frequency: Monthly or less     Drinks per session: Patient refused     Binge frequency: Never     Comment: social only    Drug use: No        Review of Systems:  Patient denies constitutional symptoms, cardiac symptoms, respiratory symptoms, GI symptoms.  The remainder of the musculoskeletal ROS is included in the HPI.      OBJECTIVE:     Physical Exam:  Gen:  No acute distress  CV:  Peripherally well-perfused.  Pulses 2+ bilaterally.  Lungs:  Normal respiratory effort.  Abdomen:  Soft, non-tender, non-distended  Head/Neck:  Normocephalic.  Atraumatic. No TTP, AROM and PROM intact without pain  Neuro:  CN intact without deficit, SILT throughout B/L Upper & Lower Extremities    MSK:  , ER 65  No deformity    No imaging was obtained for this visit as patient was unable to complete an in clinic visit.    ASSESSMENT/PLAN:     A/P: Rayne Aaron is a 55 y.o. with likely L shoulder impingement, subacromial bursitis    Plan:  -Discussed that fracture cannot be excluded without XR. Likelihood of fracture is relatively low given his visual exam and history and the decision was made to defer visiting the urgent care during this time due to age, comorbidities, and risk of tonya COVID-19   - Home exercise program was given/demonstrated for the patient to perform in the interim. Patient was advised to proceed with this program as pain allows.   - Discussed avoidance of pain causing activities including lifting and exercise that exacerbates the problem.   - Patient was advised to contact the clinic should there be an acute worsening in their symptoms. Also discussed potentially visiting an urgent care for traumatic, acute injuries.    - Discussed avoidance of NSAIDs at this point given potential worsening of COVID symptoms. Extra-strength tylenol may be potentially used for pain relief if allowed by patient's PCP and comorbidities.   - At the end of the visit, patient was given the opportunity to ask any questions and discuss any other potential issues. They were also given the option of follow up video/audio discussion with attending provider.   - Follow up in 4-6 weeks to reassess for possible injection and MRI

## 2020-04-09 ENCOUNTER — PATIENT MESSAGE (OUTPATIENT)
Dept: SPORTS MEDICINE | Facility: CLINIC | Age: 55
End: 2020-04-09

## 2020-04-09 ENCOUNTER — TELEPHONE (OUTPATIENT)
Dept: INTERNAL MEDICINE | Facility: CLINIC | Age: 55
End: 2020-04-09

## 2020-04-09 ENCOUNTER — OFFICE VISIT (OUTPATIENT)
Dept: SPORTS MEDICINE | Facility: CLINIC | Age: 55
End: 2020-04-09
Payer: COMMERCIAL

## 2020-04-09 DIAGNOSIS — M25.512 ACUTE PAIN OF LEFT SHOULDER: ICD-10-CM

## 2020-04-09 DIAGNOSIS — S46.012A ROTATOR CUFF STRAIN, LEFT, INITIAL ENCOUNTER: ICD-10-CM

## 2020-04-09 PROCEDURE — 99201 PR OFFICE/OUTPT VISIT,NEW,LEVL I: CPT | Mod: 95,,, | Performed by: ORTHOPAEDIC SURGERY

## 2020-04-09 PROCEDURE — 99201 PR OFFICE/OUTPT VISIT,NEW,LEVL I: ICD-10-PCS | Mod: 95,,, | Performed by: ORTHOPAEDIC SURGERY

## 2020-04-09 NOTE — TELEPHONE ENCOUNTER
----- Message from Deb Ware MD sent at 4/9/2020  8:26 AM CDT -----  Please notify pt that shoulder xray shows mild arthritis. However I do not think that is cause of pain. We will see what ortho recommends

## 2020-04-09 NOTE — LETTER
April 14, 2020      Deb Ware MD  [address]  [place] [address]           [place] [address] [address] [facility] [address]  Phone: [phone]          Patient: Rayne Aaron   MR Number: [id number]   YOB: 1965   Date of Visit: 4/9/2020       Dear Dr. Deb Ware:    Thank you for referring Rayne Aaron to me for evaluation. Attached you will find relevant portions of my assessment and plan of care.    If you have questions, please do not hesitate to call me. I look forward to following Rayne Aaron along with you.    Sincerely,    Valentin Coleman MD    Enclosure  CC:  No Recipients    If you would like to receive this communication electronically, please contact [email or web address] or [phone] to request more information on [employer] access.    For providers and/or their staff who would like to refer a patient to [facility], please contact us through our one-stop-shop provider referral line, [facility], at [phone].    If you feel you have received this communication in error or would no longer like to receive these types of communications, please e-mail [email or web address]

## 2020-05-15 DIAGNOSIS — E11.9 TYPE 2 DIABETES MELLITUS WITHOUT COMPLICATION: ICD-10-CM

## 2020-05-18 ENCOUNTER — PATIENT OUTREACH (OUTPATIENT)
Dept: ADMINISTRATIVE | Facility: HOSPITAL | Age: 55
End: 2020-05-18

## 2020-05-18 DIAGNOSIS — Z12.11 COLON CANCER SCREENING: Primary | ICD-10-CM

## 2020-07-02 DIAGNOSIS — E11.9 TYPE 2 DIABETES MELLITUS WITHOUT COMPLICATION: ICD-10-CM

## 2020-09-14 ENCOUNTER — LAB VISIT (OUTPATIENT)
Dept: LAB | Facility: HOSPITAL | Age: 55
End: 2020-09-14
Attending: HOSPITALIST
Payer: COMMERCIAL

## 2020-09-14 DIAGNOSIS — Z12.11 COLON CANCER SCREENING: ICD-10-CM

## 2020-09-14 PROCEDURE — 82274 ASSAY TEST FOR BLOOD FECAL: CPT

## 2020-09-17 LAB — HEMOCCULT STL QL IA: NEGATIVE

## 2020-09-22 DIAGNOSIS — E11.9 TYPE 2 DIABETES MELLITUS WITHOUT COMPLICATION: ICD-10-CM

## 2020-09-22 DIAGNOSIS — I15.0 RENOVASCULAR HYPERTENSION: ICD-10-CM

## 2020-09-22 DIAGNOSIS — F32.A DEPRESSION: ICD-10-CM

## 2020-09-22 RX ORDER — METFORMIN HYDROCHLORIDE 1000 MG/1
1000 TABLET ORAL 2 TIMES DAILY WITH MEALS
Qty: 180 TABLET | Refills: 3 | Status: SHIPPED | OUTPATIENT
Start: 2020-09-22 | End: 2021-04-30 | Stop reason: SDUPTHER

## 2020-09-22 RX ORDER — LOSARTAN POTASSIUM 100 MG/1
100 TABLET ORAL DAILY
Qty: 90 TABLET | Refills: 3 | Status: SHIPPED | OUTPATIENT
Start: 2020-09-22 | End: 2021-01-15

## 2020-09-22 RX ORDER — SERTRALINE HYDROCHLORIDE 50 MG/1
50 TABLET, FILM COATED ORAL DAILY
Qty: 90 TABLET | Refills: 3 | Status: SHIPPED | OUTPATIENT
Start: 2020-09-22 | End: 2021-11-15 | Stop reason: SDUPTHER

## 2020-10-05 ENCOUNTER — PATIENT MESSAGE (OUTPATIENT)
Dept: ADMINISTRATIVE | Facility: HOSPITAL | Age: 55
End: 2020-10-05

## 2020-11-24 ENCOUNTER — PATIENT OUTREACH (OUTPATIENT)
Dept: ADMINISTRATIVE | Facility: HOSPITAL | Age: 55
End: 2020-11-24

## 2020-11-24 NOTE — PROGRESS NOTES
Chart reviewed  Spoke with pt and she states that she does not currently have insurance. She states that her  recently pasted away and will call back at the beginning of the year.

## 2020-12-28 ENCOUNTER — TELEPHONE (OUTPATIENT)
Dept: INTERNAL MEDICINE | Facility: CLINIC | Age: 55
End: 2020-12-28

## 2020-12-28 NOTE — TELEPHONE ENCOUNTER
Please call to triage and decide where you can fit in on dr a schedule when you return.    Thanks debbie

## 2020-12-28 NOTE — TELEPHONE ENCOUNTER
From: Rayne Aaron   Sent: 12/27/2020   6:19 PM CST   To: [facility] Clinical Support   Subject: Appointment Request                                Appointment Request From: Rayne Aaron      With Provider: Deb Ware MD [[facility] - Internal Med]      Preferred Date Range: 1/1/2021 - 1/4/2021      Preferred Times: Monday Morning, Tuesday Morning, Wednesday Morning, Thursday Morning, Friday Morning      Reason for visit: Left arm tingling  left hand numb. NO CHEST PAINS associated with this at all. Cant hold anything  and hard to get dresssed      Comments:   My arm and then I'll shcedule my  annual check ups

## 2020-12-29 ENCOUNTER — HOSPITAL ENCOUNTER (INPATIENT)
Facility: HOSPITAL | Age: 55
LOS: 1 days | Discharge: HOME | DRG: 066 | End: 2020-12-30
Attending: EMERGENCY MEDICINE | Admitting: PSYCHIATRY & NEUROLOGY
Payer: COMMERCIAL

## 2020-12-29 ENCOUNTER — TELEPHONE (OUTPATIENT)
Dept: INTERNAL MEDICINE | Facility: CLINIC | Age: 55
End: 2020-12-29

## 2020-12-29 DIAGNOSIS — Z79.4 TYPE 2 DIABETES MELLITUS WITH HYPERGLYCEMIA, WITH LONG-TERM CURRENT USE OF INSULIN: ICD-10-CM

## 2020-12-29 DIAGNOSIS — E11.65 TYPE 2 DIABETES MELLITUS WITH HYPERGLYCEMIA, WITH LONG-TERM CURRENT USE OF INSULIN: ICD-10-CM

## 2020-12-29 DIAGNOSIS — R07.9 CHEST PAIN, UNSPECIFIED TYPE: ICD-10-CM

## 2020-12-29 DIAGNOSIS — I63.411 CEREBRAL INFARCTION DUE TO EMBOLISM OF RIGHT MIDDLE CEREBRAL ARTERY: Primary | ICD-10-CM

## 2020-12-29 DIAGNOSIS — I10 HTN (HYPERTENSION): ICD-10-CM

## 2020-12-29 PROBLEM — I63.531 ACUTE RIGHT PCA STROKE: Status: ACTIVE | Noted: 2020-12-29

## 2020-12-29 LAB
ALBUMIN SERPL BCP-MCNC: 3.7 G/DL (ref 3.5–5.2)
ALP SERPL-CCNC: 77 U/L (ref 55–135)
ALT SERPL W/O P-5'-P-CCNC: 10 U/L (ref 10–44)
ANION GAP SERPL CALC-SCNC: 13 MMOL/L (ref 8–16)
AST SERPL-CCNC: 10 U/L (ref 10–40)
BASOPHILS # BLD AUTO: 0.04 K/UL (ref 0–0.2)
BASOPHILS NFR BLD: 0.5 % (ref 0–1.9)
BILIRUB SERPL-MCNC: 0.3 MG/DL (ref 0.1–1)
BUN SERPL-MCNC: 21 MG/DL (ref 6–20)
CALCIUM SERPL-MCNC: 9.2 MG/DL (ref 8.7–10.5)
CHLORIDE SERPL-SCNC: 104 MMOL/L (ref 95–110)
CHOLEST SERPL-MCNC: 277 MG/DL (ref 120–199)
CHOLEST/HDLC SERPL: 6.3 {RATIO} (ref 2–5)
CO2 SERPL-SCNC: 19 MMOL/L (ref 23–29)
CREAT SERPL-MCNC: 0.8 MG/DL (ref 0.5–1.4)
CTP QC/QA: YES
DIFFERENTIAL METHOD: NORMAL
EOSINOPHIL # BLD AUTO: 0.1 K/UL (ref 0–0.5)
EOSINOPHIL NFR BLD: 1.3 % (ref 0–8)
ERYTHROCYTE [DISTWIDTH] IN BLOOD BY AUTOMATED COUNT: 11.9 % (ref 11.5–14.5)
EST. GFR  (AFRICAN AMERICAN): >60 ML/MIN/1.73 M^2
EST. GFR  (NON AFRICAN AMERICAN): >60 ML/MIN/1.73 M^2
ESTIMATED AVG GLUCOSE: 346 MG/DL (ref 68–131)
GLUCOSE SERPL-MCNC: 274 MG/DL (ref 70–110)
HBA1C MFR BLD HPLC: 13.7 % (ref 4–5.6)
HCT VFR BLD AUTO: 41.4 % (ref 37–48.5)
HDLC SERPL-MCNC: 44 MG/DL (ref 40–75)
HDLC SERPL: 15.9 % (ref 20–50)
HGB BLD-MCNC: 13.8 G/DL (ref 12–16)
IMM GRANULOCYTES # BLD AUTO: 0.02 K/UL (ref 0–0.04)
IMM GRANULOCYTES NFR BLD AUTO: 0.3 % (ref 0–0.5)
INR PPP: 0.9 (ref 0.8–1.2)
LDLC SERPL CALC-MCNC: 162.6 MG/DL (ref 63–159)
LYMPHOCYTES # BLD AUTO: 2.8 K/UL (ref 1–4.8)
LYMPHOCYTES NFR BLD: 37.7 % (ref 18–48)
MCH RBC QN AUTO: 29.4 PG (ref 27–31)
MCHC RBC AUTO-ENTMCNC: 33.3 G/DL (ref 32–36)
MCV RBC AUTO: 88 FL (ref 82–98)
MONOCYTES # BLD AUTO: 0.5 K/UL (ref 0.3–1)
MONOCYTES NFR BLD: 7 % (ref 4–15)
NEUTROPHILS # BLD AUTO: 4 K/UL (ref 1.8–7.7)
NEUTROPHILS NFR BLD: 53.2 % (ref 38–73)
NONHDLC SERPL-MCNC: 233 MG/DL
NRBC BLD-RTO: 0 /100 WBC
PLATELET # BLD AUTO: 233 K/UL (ref 150–350)
PMV BLD AUTO: 11.3 FL (ref 9.2–12.9)
POCT GLUCOSE: 256 MG/DL (ref 70–110)
POTASSIUM SERPL-SCNC: 4.3 MMOL/L (ref 3.5–5.1)
PROT SERPL-MCNC: 6.9 G/DL (ref 6–8.4)
PROTHROMBIN TIME: 10.1 SEC (ref 9–12.5)
RBC # BLD AUTO: 4.7 M/UL (ref 4–5.4)
SARS-COV-2 RDRP RESP QL NAA+PROBE: NEGATIVE
SODIUM SERPL-SCNC: 136 MMOL/L (ref 136–145)
TRIGL SERPL-MCNC: 352 MG/DL (ref 30–150)
TSH SERPL DL<=0.005 MIU/L-ACNC: 2.69 UIU/ML (ref 0.4–4)
WBC # BLD AUTO: 7.45 K/UL (ref 3.9–12.7)

## 2020-12-29 PROCEDURE — 99285 EMERGENCY DEPT VISIT HI MDM: CPT | Mod: 25

## 2020-12-29 PROCEDURE — 93010 EKG 12-LEAD: ICD-10-PCS | Mod: ,,, | Performed by: INTERNAL MEDICINE

## 2020-12-29 PROCEDURE — 99285 PR EMERGENCY DEPT VISIT,LEVEL V: ICD-10-PCS | Mod: ,,, | Performed by: EMERGENCY MEDICINE

## 2020-12-29 PROCEDURE — C9399 UNCLASSIFIED DRUGS OR BIOLOG: HCPCS | Performed by: NURSE PRACTITIONER

## 2020-12-29 PROCEDURE — 99285 EMERGENCY DEPT VISIT HI MDM: CPT | Mod: ,,, | Performed by: EMERGENCY MEDICINE

## 2020-12-29 PROCEDURE — U0002 COVID-19 LAB TEST NON-CDC: HCPCS | Performed by: NURSE PRACTITIONER

## 2020-12-29 PROCEDURE — 83036 HEMOGLOBIN GLYCOSYLATED A1C: CPT

## 2020-12-29 PROCEDURE — 80053 COMPREHEN METABOLIC PANEL: CPT

## 2020-12-29 PROCEDURE — 25000003 PHARM REV CODE 250: Performed by: NURSE PRACTITIONER

## 2020-12-29 PROCEDURE — 25000003 PHARM REV CODE 250: Performed by: EMERGENCY MEDICINE

## 2020-12-29 PROCEDURE — 12000002 HC ACUTE/MED SURGE SEMI-PRIVATE ROOM

## 2020-12-29 PROCEDURE — 93010 ELECTROCARDIOGRAM REPORT: CPT | Mod: ,,, | Performed by: INTERNAL MEDICINE

## 2020-12-29 PROCEDURE — 93005 ELECTROCARDIOGRAM TRACING: CPT

## 2020-12-29 PROCEDURE — 84443 ASSAY THYROID STIM HORMONE: CPT

## 2020-12-29 PROCEDURE — 85025 COMPLETE CBC W/AUTO DIFF WBC: CPT

## 2020-12-29 PROCEDURE — 80061 LIPID PANEL: CPT

## 2020-12-29 PROCEDURE — 85610 PROTHROMBIN TIME: CPT

## 2020-12-29 PROCEDURE — 63600175 PHARM REV CODE 636 W HCPCS: Performed by: NURSE PRACTITIONER

## 2020-12-29 PROCEDURE — 25500020 PHARM REV CODE 255: Performed by: EMERGENCY MEDICINE

## 2020-12-29 RX ORDER — ONDANSETRON 2 MG/ML
4 INJECTION INTRAMUSCULAR; INTRAVENOUS EVERY 6 HOURS PRN
Status: DISCONTINUED | OUTPATIENT
Start: 2020-12-29 | End: 2020-12-30 | Stop reason: HOSPADM

## 2020-12-29 RX ORDER — LABETALOL HCL 20 MG/4 ML
10 SYRINGE (ML) INTRAVENOUS
Status: DISCONTINUED | OUTPATIENT
Start: 2020-12-29 | End: 2020-12-30 | Stop reason: HOSPADM

## 2020-12-29 RX ORDER — SERTRALINE HYDROCHLORIDE 50 MG/1
50 TABLET, FILM COATED ORAL DAILY
Status: DISCONTINUED | OUTPATIENT
Start: 2020-12-30 | End: 2020-12-30 | Stop reason: HOSPADM

## 2020-12-29 RX ORDER — ACETAMINOPHEN 325 MG/1
650 TABLET ORAL EVERY 6 HOURS PRN
Status: DISCONTINUED | OUTPATIENT
Start: 2020-12-29 | End: 2020-12-30 | Stop reason: HOSPADM

## 2020-12-29 RX ORDER — LABETALOL HCL 20 MG/4 ML
5 SYRINGE (ML) INTRAVENOUS
Status: COMPLETED | OUTPATIENT
Start: 2020-12-29 | End: 2020-12-29

## 2020-12-29 RX ORDER — ASPIRIN 81 MG/1
81 TABLET ORAL DAILY
Status: DISCONTINUED | OUTPATIENT
Start: 2020-12-30 | End: 2020-12-30 | Stop reason: HOSPADM

## 2020-12-29 RX ORDER — IBUPROFEN 200 MG
24 TABLET ORAL
Status: DISCONTINUED | OUTPATIENT
Start: 2020-12-29 | End: 2020-12-30 | Stop reason: HOSPADM

## 2020-12-29 RX ORDER — GLUCAGON 1 MG
1 KIT INJECTION
Status: DISCONTINUED | OUTPATIENT
Start: 2020-12-29 | End: 2020-12-30 | Stop reason: HOSPADM

## 2020-12-29 RX ORDER — HEPARIN SODIUM 5000 [USP'U]/ML
5000 INJECTION, SOLUTION INTRAVENOUS; SUBCUTANEOUS EVERY 8 HOURS
Status: DISCONTINUED | OUTPATIENT
Start: 2020-12-29 | End: 2020-12-30 | Stop reason: HOSPADM

## 2020-12-29 RX ORDER — INSULIN ASPART 100 [IU]/ML
0-5 INJECTION, SOLUTION INTRAVENOUS; SUBCUTANEOUS
Status: DISCONTINUED | OUTPATIENT
Start: 2020-12-29 | End: 2020-12-30 | Stop reason: HOSPADM

## 2020-12-29 RX ORDER — AMOXICILLIN 250 MG
1 CAPSULE ORAL DAILY PRN
Status: DISCONTINUED | OUTPATIENT
Start: 2020-12-29 | End: 2020-12-30 | Stop reason: HOSPADM

## 2020-12-29 RX ORDER — SODIUM CHLORIDE 0.9 % (FLUSH) 0.9 %
10 SYRINGE (ML) INJECTION
Status: DISCONTINUED | OUTPATIENT
Start: 2020-12-29 | End: 2020-12-30 | Stop reason: HOSPADM

## 2020-12-29 RX ORDER — CLOPIDOGREL BISULFATE 75 MG/1
75 TABLET ORAL DAILY
Status: DISCONTINUED | OUTPATIENT
Start: 2020-12-30 | End: 2020-12-30 | Stop reason: HOSPADM

## 2020-12-29 RX ORDER — ATORVASTATIN CALCIUM 20 MG/1
40 TABLET, FILM COATED ORAL DAILY
Status: DISCONTINUED | OUTPATIENT
Start: 2020-12-30 | End: 2020-12-30

## 2020-12-29 RX ORDER — IBUPROFEN 200 MG
16 TABLET ORAL
Status: DISCONTINUED | OUTPATIENT
Start: 2020-12-29 | End: 2020-12-30 | Stop reason: HOSPADM

## 2020-12-29 RX ADMIN — INSULIN DETEMIR 13 UNITS: 100 INJECTION, SOLUTION SUBCUTANEOUS at 10:12

## 2020-12-29 RX ADMIN — IOHEXOL 75 ML: 350 INJECTION, SOLUTION INTRAVENOUS at 10:12

## 2020-12-29 RX ADMIN — Medication 5 MG: at 11:12

## 2020-12-29 RX ADMIN — HEPARIN SODIUM 5000 UNITS: 5000 INJECTION INTRAVENOUS; SUBCUTANEOUS at 10:12

## 2020-12-29 NOTE — TELEPHONE ENCOUNTER
The patient states that she has tingling and numbness to the left arm and hand. She cannot make a fist. The patient informed to go to the ER.

## 2020-12-30 VITALS
RESPIRATION RATE: 18 BRPM | HEIGHT: 63 IN | OXYGEN SATURATION: 95 % | SYSTOLIC BLOOD PRESSURE: 171 MMHG | HEART RATE: 73 BPM | BODY MASS INDEX: 32.07 KG/M2 | WEIGHT: 181 LBS | TEMPERATURE: 98 F | DIASTOLIC BLOOD PRESSURE: 83 MMHG

## 2020-12-30 PROBLEM — I63.411 CEREBRAL INFARCTION DUE TO EMBOLISM OF RIGHT MIDDLE CEREBRAL ARTERY: Status: ACTIVE | Noted: 2020-12-29

## 2020-12-30 LAB
ALBUMIN SERPL BCP-MCNC: 3.5 G/DL (ref 3.5–5.2)
ALP SERPL-CCNC: 75 U/L (ref 55–135)
ALT SERPL W/O P-5'-P-CCNC: 10 U/L (ref 10–44)
ANION GAP SERPL CALC-SCNC: 11 MMOL/L (ref 8–16)
APTT BLDCRRT: 23.8 SEC (ref 21–32)
ASCENDING AORTA: 3.17 CM
AST SERPL-CCNC: 10 U/L (ref 10–40)
AV INDEX (PROSTH): 0.83
AV MEAN GRADIENT: 4 MMHG
AV PEAK GRADIENT: 7 MMHG
AV VALVE AREA: 2.92 CM2
AV VELOCITY RATIO: 0.81
BASOPHILS # BLD AUTO: 0.06 K/UL (ref 0–0.2)
BASOPHILS NFR BLD: 0.9 % (ref 0–1.9)
BILIRUB SERPL-MCNC: 0.4 MG/DL (ref 0.1–1)
BSA FOR ECHO PROCEDURE: 1.91 M2
BUN SERPL-MCNC: 13 MG/DL (ref 6–20)
CALCIUM SERPL-MCNC: 8.7 MG/DL (ref 8.7–10.5)
CHLORIDE SERPL-SCNC: 104 MMOL/L (ref 95–110)
CK MB SERPL-MCNC: 0.9 NG/ML (ref 0.1–6.5)
CK MB SERPL-RTO: 2.5 % (ref 0–5)
CK SERPL-CCNC: 36 U/L (ref 20–180)
CO2 SERPL-SCNC: 23 MMOL/L (ref 23–29)
CREAT SERPL-MCNC: 0.7 MG/DL (ref 0.5–1.4)
CV ECHO LV RWT: 0.33 CM
DIFFERENTIAL METHOD: NORMAL
DOP CALC AO PEAK VEL: 1.31 M/S
DOP CALC AO VTI: 29.1 CM
DOP CALC LVOT AREA: 3.5 CM2
DOP CALC LVOT DIAMETER: 2.12 CM
DOP CALC LVOT PEAK VEL: 1.06 M/S
DOP CALC LVOT STROKE VOLUME: 85.03 CM3
DOP CALCLVOT PEAK VEL VTI: 24.1 CM
E WAVE DECELERATION TIME: 259.87 MSEC
E/A RATIO: 0.74
E/E' RATIO: 11.17 M/S
ECHO LV POSTERIOR WALL: 0.74 CM (ref 0.6–1.1)
EOSINOPHIL # BLD AUTO: 0.1 K/UL (ref 0–0.5)
EOSINOPHIL NFR BLD: 1.3 % (ref 0–8)
ERYTHROCYTE [DISTWIDTH] IN BLOOD BY AUTOMATED COUNT: 12 % (ref 11.5–14.5)
EST. GFR  (AFRICAN AMERICAN): >60 ML/MIN/1.73 M^2
EST. GFR  (NON AFRICAN AMERICAN): >60 ML/MIN/1.73 M^2
FRACTIONAL SHORTENING: 30 % (ref 28–44)
GLUCOSE SERPL-MCNC: 242 MG/DL (ref 70–110)
HCT VFR BLD AUTO: 40.2 % (ref 37–48.5)
HGB BLD-MCNC: 13.4 G/DL (ref 12–16)
IMM GRANULOCYTES # BLD AUTO: 0.02 K/UL (ref 0–0.04)
IMM GRANULOCYTES NFR BLD AUTO: 0.3 % (ref 0–0.5)
INR PPP: 0.9 (ref 0.8–1.2)
INTERVENTRICULAR SEPTUM: 0.96 CM (ref 0.6–1.1)
IVRT: 114.18 MSEC
LA MAJOR: 5.17 CM
LA MINOR: 5.23 CM
LA WIDTH: 3.88 CM
LEFT ATRIUM SIZE: 3.62 CM
LEFT ATRIUM VOLUME INDEX MOD: 19.4 ML/M2
LEFT ATRIUM VOLUME INDEX: 33.5 ML/M2
LEFT ATRIUM VOLUME MOD: 36.01 CM3
LEFT ATRIUM VOLUME: 62.08 CM3
LEFT INTERNAL DIMENSION IN SYSTOLE: 3.19 CM (ref 2.1–4)
LEFT VENTRICLE DIASTOLIC VOLUME INDEX: 51 ML/M2
LEFT VENTRICLE DIASTOLIC VOLUME: 94.52 ML
LEFT VENTRICLE MASS INDEX: 67 G/M2
LEFT VENTRICLE SYSTOLIC VOLUME INDEX: 22 ML/M2
LEFT VENTRICLE SYSTOLIC VOLUME: 40.72 ML
LEFT VENTRICULAR INTERNAL DIMENSION IN DIASTOLE: 4.54 CM (ref 3.5–6)
LEFT VENTRICULAR MASS: 124.9 G
LV LATERAL E/E' RATIO: 8.38 M/S
LV SEPTAL E/E' RATIO: 16.75 M/S
LYMPHOCYTES # BLD AUTO: 2 K/UL (ref 1–4.8)
LYMPHOCYTES NFR BLD: 32.2 % (ref 18–48)
MAGNESIUM SERPL-MCNC: 1.6 MG/DL (ref 1.6–2.6)
MCH RBC QN AUTO: 28.8 PG (ref 27–31)
MCHC RBC AUTO-ENTMCNC: 33.3 G/DL (ref 32–36)
MCV RBC AUTO: 87 FL (ref 82–98)
MONOCYTES # BLD AUTO: 0.5 K/UL (ref 0.3–1)
MONOCYTES NFR BLD: 7.3 % (ref 4–15)
MV A" WAVE DURATION": 10.85 MSEC
MV PEAK A VEL: 0.91 M/S
MV PEAK E VEL: 0.67 M/S
NEUTROPHILS # BLD AUTO: 3.7 K/UL (ref 1.8–7.7)
NEUTROPHILS NFR BLD: 58 % (ref 38–73)
NRBC BLD-RTO: 0 /100 WBC
PHOSPHATE SERPL-MCNC: 3.7 MG/DL (ref 2.7–4.5)
PISA TR MAX VEL: 2.46 M/S
PLATELET # BLD AUTO: 237 K/UL (ref 150–350)
PMV BLD AUTO: 10.8 FL (ref 9.2–12.9)
POCT GLUCOSE: 266 MG/DL (ref 70–110)
POTASSIUM SERPL-SCNC: 3.9 MMOL/L (ref 3.5–5.1)
PROT SERPL-MCNC: 6.5 G/DL (ref 6–8.4)
PROTHROMBIN TIME: 10.4 SEC (ref 9–12.5)
PULM VEIN S/D RATIO: 1.49
PV PEAK D VEL: 0.39 M/S
PV PEAK S VEL: 0.58 M/S
RA MAJOR: 5 CM
RA PRESSURE: 3 MMHG
RA WIDTH: 2.96 CM
RBC # BLD AUTO: 4.65 M/UL (ref 4–5.4)
RIGHT VENTRICULAR END-DIASTOLIC DIMENSION: 3.11 CM
RV TISSUE DOPPLER FREE WALL SYSTOLIC VELOCITY 1 (APICAL 4 CHAMBER VIEW): 12.92 CM/S
SINUS: 3.09 CM
SODIUM SERPL-SCNC: 138 MMOL/L (ref 136–145)
STJ: 2.39 CM
TDI LATERAL: 0.08 M/S
TDI SEPTAL: 0.04 M/S
TDI: 0.06 M/S
TR MAX PG: 24 MMHG
TRICUSPID ANNULAR PLANE SYSTOLIC EXCURSION: 1.89 CM
TROPONIN I SERPL DL<=0.01 NG/ML-MCNC: <0.006 NG/ML (ref 0–0.03)
TV REST PULMONARY ARTERY PRESSURE: 27 MMHG
WBC # BLD AUTO: 6.33 K/UL (ref 3.9–12.7)

## 2020-12-30 PROCEDURE — 84484 ASSAY OF TROPONIN QUANT: CPT

## 2020-12-30 PROCEDURE — 84100 ASSAY OF PHOSPHORUS: CPT

## 2020-12-30 PROCEDURE — 85730 THROMBOPLASTIN TIME PARTIAL: CPT

## 2020-12-30 PROCEDURE — 36415 COLL VENOUS BLD VENIPUNCTURE: CPT

## 2020-12-30 PROCEDURE — 97530 THERAPEUTIC ACTIVITIES: CPT

## 2020-12-30 PROCEDURE — 97161 PT EVAL LOW COMPLEX 20 MIN: CPT

## 2020-12-30 PROCEDURE — 99223 1ST HOSP IP/OBS HIGH 75: CPT | Mod: ,,, | Performed by: PSYCHIATRY & NEUROLOGY

## 2020-12-30 PROCEDURE — 83735 ASSAY OF MAGNESIUM: CPT

## 2020-12-30 PROCEDURE — 63600175 PHARM REV CODE 636 W HCPCS: Performed by: NURSE PRACTITIONER

## 2020-12-30 PROCEDURE — 92523 SPEECH SOUND LANG COMPREHEN: CPT

## 2020-12-30 PROCEDURE — 92610 EVALUATE SWALLOWING FUNCTION: CPT

## 2020-12-30 PROCEDURE — 80053 COMPREHEN METABOLIC PANEL: CPT

## 2020-12-30 PROCEDURE — 85025 COMPLETE CBC W/AUTO DIFF WBC: CPT

## 2020-12-30 PROCEDURE — 25000003 PHARM REV CODE 250: Performed by: NURSE PRACTITIONER

## 2020-12-30 PROCEDURE — 82553 CREATINE MB FRACTION: CPT

## 2020-12-30 PROCEDURE — 82550 ASSAY OF CK (CPK): CPT

## 2020-12-30 PROCEDURE — 97165 OT EVAL LOW COMPLEX 30 MIN: CPT

## 2020-12-30 PROCEDURE — 99223 PR INITIAL HOSPITAL CARE,LEVL III: ICD-10-PCS | Mod: ,,, | Performed by: PSYCHIATRY & NEUROLOGY

## 2020-12-30 PROCEDURE — 85610 PROTHROMBIN TIME: CPT

## 2020-12-30 RX ORDER — ATORVASTATIN CALCIUM 80 MG/1
80 TABLET, FILM COATED ORAL DAILY
Qty: 90 TABLET | Refills: 3 | Status: SHIPPED | OUTPATIENT
Start: 2020-12-31 | End: 2022-02-08 | Stop reason: SDUPTHER

## 2020-12-30 RX ORDER — CLOPIDOGREL BISULFATE 75 MG/1
75 TABLET ORAL DAILY
Qty: 30 TABLET | Refills: 6 | Status: SHIPPED | OUTPATIENT
Start: 2020-12-31 | End: 2021-02-18 | Stop reason: ALTCHOICE

## 2020-12-30 RX ORDER — ASPIRIN 81 MG/1
81 TABLET ORAL DAILY
Qty: 180 TABLET | Refills: 2 | Status: SHIPPED | OUTPATIENT
Start: 2020-12-31 | End: 2021-12-31

## 2020-12-30 RX ORDER — MONTELUKAST SODIUM 10 MG/1
10 TABLET ORAL NIGHTLY
Status: DISCONTINUED | OUTPATIENT
Start: 2020-12-30 | End: 2020-12-30 | Stop reason: HOSPADM

## 2020-12-30 RX ORDER — ATORVASTATIN CALCIUM 20 MG/1
80 TABLET, FILM COATED ORAL DAILY
Status: DISCONTINUED | OUTPATIENT
Start: 2020-12-31 | End: 2020-12-30 | Stop reason: HOSPADM

## 2020-12-30 RX ADMIN — HEPARIN SODIUM 5000 UNITS: 5000 INJECTION INTRAVENOUS; SUBCUTANEOUS at 03:12

## 2020-12-30 RX ADMIN — ATORVASTATIN CALCIUM 40 MG: 20 TABLET, FILM COATED ORAL at 10:12

## 2020-12-30 RX ADMIN — CLOPIDOGREL 75 MG: 75 TABLET, FILM COATED ORAL at 10:12

## 2020-12-30 RX ADMIN — SERTRALINE HYDROCHLORIDE 50 MG: 50 TABLET ORAL at 10:12

## 2020-12-30 RX ADMIN — ASPIRIN 81 MG: 81 TABLET, COATED ORAL at 10:12

## 2020-12-30 RX ADMIN — HEPARIN SODIUM 5000 UNITS: 5000 INJECTION INTRAVENOUS; SUBCUTANEOUS at 05:12

## 2020-12-30 RX ADMIN — INSULIN ASPART 3 UNITS: 100 INJECTION, SOLUTION INTRAVENOUS; SUBCUTANEOUS at 12:12

## 2021-01-04 ENCOUNTER — TELEPHONE (OUTPATIENT)
Dept: NEUROLOGY | Facility: CLINIC | Age: 56
End: 2021-01-04

## 2021-01-04 ENCOUNTER — PATIENT MESSAGE (OUTPATIENT)
Dept: ADMINISTRATIVE | Facility: HOSPITAL | Age: 56
End: 2021-01-04

## 2021-01-06 DIAGNOSIS — Z12.31 OTHER SCREENING MAMMOGRAM: ICD-10-CM

## 2021-01-11 ENCOUNTER — CLINICAL SUPPORT (OUTPATIENT)
Dept: REHABILITATION | Facility: HOSPITAL | Age: 56
End: 2021-01-11
Payer: COMMERCIAL

## 2021-01-11 DIAGNOSIS — H53.462 LEFT HOMONYMOUS HEMIANOPSIA DUE TO RECENT CEREBRAL INFARCTION: ICD-10-CM

## 2021-01-11 DIAGNOSIS — R20.8 DECREASED SENSATION OF HAND AND ARM: ICD-10-CM

## 2021-01-11 DIAGNOSIS — Z78.9 IMPAIRED INSTRUMENTAL ACTIVITIES OF DAILY LIVING (IADL): ICD-10-CM

## 2021-01-11 DIAGNOSIS — R29.898 LEFT HAND WEAKNESS: ICD-10-CM

## 2021-01-11 DIAGNOSIS — R29.898 FINE MOTOR SKILL LOSS: ICD-10-CM

## 2021-01-11 DIAGNOSIS — I69.398 LEFT HOMONYMOUS HEMIANOPSIA DUE TO RECENT CEREBRAL INFARCTION: ICD-10-CM

## 2021-01-11 DIAGNOSIS — R29.818 FINE MOTOR SKILL LOSS: ICD-10-CM

## 2021-01-11 DIAGNOSIS — I63.411 CEREBRAL INFARCTION DUE TO EMBOLISM OF RIGHT MIDDLE CEREBRAL ARTERY: ICD-10-CM

## 2021-01-11 PROCEDURE — 97018 PARAFFIN BATH THERAPY: CPT | Mod: PO

## 2021-01-11 PROCEDURE — 97165 OT EVAL LOW COMPLEX 30 MIN: CPT | Mod: PO

## 2021-01-15 ENCOUNTER — TELEPHONE (OUTPATIENT)
Dept: INTERNAL MEDICINE | Facility: CLINIC | Age: 56
End: 2021-01-15

## 2021-01-15 ENCOUNTER — OFFICE VISIT (OUTPATIENT)
Dept: INTERNAL MEDICINE | Facility: CLINIC | Age: 56
End: 2021-01-15
Payer: COMMERCIAL

## 2021-01-15 VITALS
WEIGHT: 171.94 LBS | DIASTOLIC BLOOD PRESSURE: 84 MMHG | SYSTOLIC BLOOD PRESSURE: 170 MMHG | HEIGHT: 63 IN | HEART RATE: 72 BPM | RESPIRATION RATE: 16 BRPM | BODY MASS INDEX: 30.46 KG/M2

## 2021-01-15 DIAGNOSIS — E11.65 TYPE 2 DIABETES MELLITUS WITH HYPERGLYCEMIA, WITH LONG-TERM CURRENT USE OF INSULIN: ICD-10-CM

## 2021-01-15 DIAGNOSIS — Z79.4 TYPE 2 DIABETES MELLITUS WITH HYPERGLYCEMIA, WITH LONG-TERM CURRENT USE OF INSULIN: ICD-10-CM

## 2021-01-15 DIAGNOSIS — E78.2 MIXED HYPERLIPIDEMIA: ICD-10-CM

## 2021-01-15 DIAGNOSIS — Z09 HOSPITAL DISCHARGE FOLLOW-UP: Primary | ICD-10-CM

## 2021-01-15 DIAGNOSIS — I10 ESSENTIAL HYPERTENSION: ICD-10-CM

## 2021-01-15 DIAGNOSIS — Z86.73 RECENT CEREBROVASCULAR ACCIDENT (CVA): ICD-10-CM

## 2021-01-15 PROCEDURE — 1125F AMNT PAIN NOTED PAIN PRSNT: CPT | Mod: S$GLB,,, | Performed by: HOSPITALIST

## 2021-01-15 PROCEDURE — 1125F PR PAIN SEVERITY QUANTIFIED, PAIN PRESENT: ICD-10-PCS | Mod: S$GLB,,, | Performed by: HOSPITALIST

## 2021-01-15 PROCEDURE — 99214 OFFICE O/P EST MOD 30 MIN: CPT | Mod: S$GLB,,, | Performed by: HOSPITALIST

## 2021-01-15 PROCEDURE — 99999 PR PBB SHADOW E&M-EST. PATIENT-LVL IV: CPT | Mod: PBBFAC,,, | Performed by: HOSPITALIST

## 2021-01-15 PROCEDURE — 99999 PR PBB SHADOW E&M-EST. PATIENT-LVL IV: ICD-10-PCS | Mod: PBBFAC,,, | Performed by: HOSPITALIST

## 2021-01-15 PROCEDURE — 3008F PR BODY MASS INDEX (BMI) DOCUMENTED: ICD-10-PCS | Mod: CPTII,S$GLB,, | Performed by: HOSPITALIST

## 2021-01-15 PROCEDURE — 99214 PR OFFICE/OUTPT VISIT, EST, LEVL IV, 30-39 MIN: ICD-10-PCS | Mod: S$GLB,,, | Performed by: HOSPITALIST

## 2021-01-15 PROCEDURE — 3008F BODY MASS INDEX DOCD: CPT | Mod: CPTII,S$GLB,, | Performed by: HOSPITALIST

## 2021-01-15 RX ORDER — VALSARTAN 320 MG/1
320 TABLET ORAL DAILY
Qty: 30 TABLET | Refills: 11 | Status: SHIPPED | OUTPATIENT
Start: 2021-01-15 | End: 2022-02-08 | Stop reason: SDUPTHER

## 2021-01-25 ENCOUNTER — PATIENT OUTREACH (OUTPATIENT)
Dept: ADMINISTRATIVE | Facility: HOSPITAL | Age: 56
End: 2021-01-25

## 2021-01-25 ENCOUNTER — TELEPHONE (OUTPATIENT)
Dept: INTERNAL MEDICINE | Facility: CLINIC | Age: 56
End: 2021-01-25

## 2021-01-26 ENCOUNTER — OFFICE VISIT (OUTPATIENT)
Dept: INTERNAL MEDICINE | Facility: CLINIC | Age: 56
End: 2021-01-26
Payer: COMMERCIAL

## 2021-01-26 VITALS
SYSTOLIC BLOOD PRESSURE: 136 MMHG | OXYGEN SATURATION: 98 % | BODY MASS INDEX: 30.04 KG/M2 | WEIGHT: 169.56 LBS | HEIGHT: 63 IN | RESPIRATION RATE: 18 BRPM | DIASTOLIC BLOOD PRESSURE: 80 MMHG | HEART RATE: 78 BPM | TEMPERATURE: 98 F

## 2021-01-26 DIAGNOSIS — I63.411 CEREBRAL INFARCTION DUE TO EMBOLISM OF RIGHT MIDDLE CEREBRAL ARTERY: ICD-10-CM

## 2021-01-26 DIAGNOSIS — E11.65 TYPE 2 DIABETES MELLITUS WITH HYPERGLYCEMIA, WITH LONG-TERM CURRENT USE OF INSULIN: Primary | ICD-10-CM

## 2021-01-26 DIAGNOSIS — I15.2 HYPERTENSION ASSOCIATED WITH DIABETES: ICD-10-CM

## 2021-01-26 DIAGNOSIS — E78.5 HYPERLIPIDEMIA ASSOCIATED WITH TYPE 2 DIABETES MELLITUS: ICD-10-CM

## 2021-01-26 DIAGNOSIS — E11.69 HYPERLIPIDEMIA ASSOCIATED WITH TYPE 2 DIABETES MELLITUS: ICD-10-CM

## 2021-01-26 DIAGNOSIS — E04.2 MULTINODULAR GOITER: ICD-10-CM

## 2021-01-26 DIAGNOSIS — E66.01 SEVERE OBESITY: ICD-10-CM

## 2021-01-26 DIAGNOSIS — Z79.4 TYPE 2 DIABETES MELLITUS WITH HYPERGLYCEMIA, WITH LONG-TERM CURRENT USE OF INSULIN: Primary | ICD-10-CM

## 2021-01-26 DIAGNOSIS — E11.59 HYPERTENSION ASSOCIATED WITH DIABETES: ICD-10-CM

## 2021-01-26 PROBLEM — I10 ESSENTIAL HYPERTENSION: Status: RESOLVED | Noted: 2019-05-09 | Resolved: 2021-01-26

## 2021-01-26 PROBLEM — I10 HTN (HYPERTENSION): Status: RESOLVED | Noted: 2020-12-29 | Resolved: 2021-01-26

## 2021-01-26 LAB — GLUCOSE SERPL-MCNC: 281 MG/DL (ref 70–110)

## 2021-01-26 PROCEDURE — 3046F PR MOST RECENT HEMOGLOBIN A1C LEVEL > 9.0%: ICD-10-PCS | Mod: CPTII,S$GLB,, | Performed by: NURSE PRACTITIONER

## 2021-01-26 PROCEDURE — 3075F PR MOST RECENT SYSTOLIC BLOOD PRESS GE 130-139MM HG: ICD-10-PCS | Mod: CPTII,S$GLB,, | Performed by: NURSE PRACTITIONER

## 2021-01-26 PROCEDURE — 82962 GLUCOSE BLOOD TEST: CPT | Mod: S$GLB,,, | Performed by: NURSE PRACTITIONER

## 2021-01-26 PROCEDURE — 3079F DIAST BP 80-89 MM HG: CPT | Mod: CPTII,S$GLB,, | Performed by: NURSE PRACTITIONER

## 2021-01-26 PROCEDURE — 1126F AMNT PAIN NOTED NONE PRSNT: CPT | Mod: S$GLB,,, | Performed by: NURSE PRACTITIONER

## 2021-01-26 PROCEDURE — 99999 PR PBB SHADOW E&M-EST. PATIENT-LVL V: CPT | Mod: PBBFAC,,, | Performed by: NURSE PRACTITIONER

## 2021-01-26 PROCEDURE — 99215 PR OFFICE/OUTPT VISIT, EST, LEVL V, 40-54 MIN: ICD-10-PCS | Mod: S$GLB,,, | Performed by: NURSE PRACTITIONER

## 2021-01-26 PROCEDURE — 3079F PR MOST RECENT DIASTOLIC BLOOD PRESSURE 80-89 MM HG: ICD-10-PCS | Mod: CPTII,S$GLB,, | Performed by: NURSE PRACTITIONER

## 2021-01-26 PROCEDURE — 3008F PR BODY MASS INDEX (BMI) DOCUMENTED: ICD-10-PCS | Mod: CPTII,S$GLB,, | Performed by: NURSE PRACTITIONER

## 2021-01-26 PROCEDURE — 3046F HEMOGLOBIN A1C LEVEL >9.0%: CPT | Mod: CPTII,S$GLB,, | Performed by: NURSE PRACTITIONER

## 2021-01-26 PROCEDURE — 82962 POCT GLUCOSE, HAND-HELD DEVICE: ICD-10-PCS | Mod: S$GLB,,, | Performed by: NURSE PRACTITIONER

## 2021-01-26 PROCEDURE — 1126F PR PAIN SEVERITY QUANTIFIED, NO PAIN PRESENT: ICD-10-PCS | Mod: S$GLB,,, | Performed by: NURSE PRACTITIONER

## 2021-01-26 PROCEDURE — 99215 OFFICE O/P EST HI 40 MIN: CPT | Mod: S$GLB,,, | Performed by: NURSE PRACTITIONER

## 2021-01-26 PROCEDURE — 99999 PR PBB SHADOW E&M-EST. PATIENT-LVL V: ICD-10-PCS | Mod: PBBFAC,,, | Performed by: NURSE PRACTITIONER

## 2021-01-26 PROCEDURE — 3008F BODY MASS INDEX DOCD: CPT | Mod: CPTII,S$GLB,, | Performed by: NURSE PRACTITIONER

## 2021-01-26 PROCEDURE — 3075F SYST BP GE 130 - 139MM HG: CPT | Mod: CPTII,S$GLB,, | Performed by: NURSE PRACTITIONER

## 2021-01-26 RX ORDER — BLOOD-GLUCOSE,RECEIVER,CONT
1 EACH MISCELLANEOUS DAILY
Qty: 1 EACH | Refills: 0 | Status: SHIPPED | OUTPATIENT
Start: 2021-01-26 | End: 2021-01-27 | Stop reason: SDUPTHER

## 2021-01-26 RX ORDER — SEMAGLUTIDE 1.34 MG/ML
0.5 INJECTION, SOLUTION SUBCUTANEOUS
Qty: 1 SYRINGE | Refills: 3 | Status: SHIPPED | OUTPATIENT
Start: 2021-01-26 | End: 2021-01-27 | Stop reason: ALTCHOICE

## 2021-01-26 RX ORDER — BLOOD-GLUCOSE TRANSMITTER
1 EACH MISCELLANEOUS DAILY
Qty: 1 EACH | Refills: 3 | Status: SHIPPED | OUTPATIENT
Start: 2021-01-26 | End: 2021-01-27 | Stop reason: SDUPTHER

## 2021-01-26 RX ORDER — INSULIN ASPART 100 [IU]/ML
10 INJECTION, SOLUTION INTRAVENOUS; SUBCUTANEOUS
Qty: 15 ML | Refills: 3 | Status: SHIPPED | OUTPATIENT
Start: 2021-01-26 | End: 2021-02-25 | Stop reason: ALTCHOICE

## 2021-01-26 RX ORDER — BLOOD-GLUCOSE SENSOR
1 EACH MISCELLANEOUS
Qty: 3 EACH | Refills: 12 | Status: SHIPPED | OUTPATIENT
Start: 2021-01-26 | End: 2021-01-27 | Stop reason: SDUPTHER

## 2021-01-27 ENCOUNTER — TELEPHONE (OUTPATIENT)
Dept: INTERNAL MEDICINE | Facility: CLINIC | Age: 56
End: 2021-01-27

## 2021-01-27 ENCOUNTER — TELEPHONE (OUTPATIENT)
Dept: PHARMACY | Facility: CLINIC | Age: 56
End: 2021-01-27

## 2021-01-27 ENCOUNTER — PATIENT MESSAGE (OUTPATIENT)
Dept: INTERNAL MEDICINE | Facility: CLINIC | Age: 56
End: 2021-01-27

## 2021-01-27 RX ORDER — BLOOD-GLUCOSE TRANSMITTER
1 EACH MISCELLANEOUS DAILY
Qty: 1 EACH | Refills: 3 | Status: SHIPPED | OUTPATIENT
Start: 2021-01-27 | End: 2021-02-25 | Stop reason: ALTCHOICE

## 2021-01-27 RX ORDER — BLOOD-GLUCOSE,RECEIVER,CONT
1 EACH MISCELLANEOUS DAILY
Qty: 1 EACH | Refills: 0 | Status: SHIPPED | OUTPATIENT
Start: 2021-01-27 | End: 2021-02-25 | Stop reason: ALTCHOICE

## 2021-01-27 RX ORDER — BLOOD-GLUCOSE SENSOR
1 EACH MISCELLANEOUS
Qty: 10 EACH | Refills: 3 | Status: SHIPPED | OUTPATIENT
Start: 2021-01-27 | End: 2021-02-25 | Stop reason: ALTCHOICE

## 2021-01-27 RX ORDER — DULAGLUTIDE 1.5 MG/.5ML
1.5 INJECTION, SOLUTION SUBCUTANEOUS
Qty: 4 PEN | Refills: 6 | Status: SHIPPED | OUTPATIENT
Start: 2021-01-27 | End: 2021-02-17 | Stop reason: SDUPTHER

## 2021-02-01 ENCOUNTER — OFFICE VISIT (OUTPATIENT)
Dept: INTERNAL MEDICINE | Facility: CLINIC | Age: 56
End: 2021-02-01
Payer: COMMERCIAL

## 2021-02-01 ENCOUNTER — PATIENT MESSAGE (OUTPATIENT)
Dept: INTERNAL MEDICINE | Facility: CLINIC | Age: 56
End: 2021-02-01

## 2021-02-01 VITALS
HEIGHT: 63 IN | BODY MASS INDEX: 30.35 KG/M2 | TEMPERATURE: 97 F | HEART RATE: 88 BPM | DIASTOLIC BLOOD PRESSURE: 90 MMHG | WEIGHT: 171.31 LBS | SYSTOLIC BLOOD PRESSURE: 130 MMHG

## 2021-02-01 DIAGNOSIS — I15.2 HYPERTENSION ASSOCIATED WITH DIABETES: Primary | ICD-10-CM

## 2021-02-01 DIAGNOSIS — Z00.00 ANNUAL PHYSICAL EXAM: Primary | ICD-10-CM

## 2021-02-01 DIAGNOSIS — E11.59 HYPERTENSION ASSOCIATED WITH DIABETES: Primary | ICD-10-CM

## 2021-02-01 PROCEDURE — 3008F PR BODY MASS INDEX (BMI) DOCUMENTED: ICD-10-PCS | Mod: CPTII,S$GLB,, | Performed by: HOSPITALIST

## 2021-02-01 PROCEDURE — 99999 PR PBB SHADOW E&M-EST. PATIENT-LVL III: CPT | Mod: PBBFAC,,, | Performed by: HOSPITALIST

## 2021-02-01 PROCEDURE — 3008F BODY MASS INDEX DOCD: CPT | Mod: CPTII,S$GLB,, | Performed by: HOSPITALIST

## 2021-02-01 PROCEDURE — 99213 OFFICE O/P EST LOW 20 MIN: CPT | Mod: S$GLB,,, | Performed by: HOSPITALIST

## 2021-02-01 PROCEDURE — 3080F DIAST BP >= 90 MM HG: CPT | Mod: CPTII,S$GLB,, | Performed by: HOSPITALIST

## 2021-02-01 PROCEDURE — 1126F PR PAIN SEVERITY QUANTIFIED, NO PAIN PRESENT: ICD-10-PCS | Mod: S$GLB,,, | Performed by: HOSPITALIST

## 2021-02-01 PROCEDURE — 99213 PR OFFICE/OUTPT VISIT, EST, LEVL III, 20-29 MIN: ICD-10-PCS | Mod: S$GLB,,, | Performed by: HOSPITALIST

## 2021-02-01 PROCEDURE — 3075F SYST BP GE 130 - 139MM HG: CPT | Mod: CPTII,S$GLB,, | Performed by: HOSPITALIST

## 2021-02-01 PROCEDURE — 99999 PR PBB SHADOW E&M-EST. PATIENT-LVL III: ICD-10-PCS | Mod: PBBFAC,,, | Performed by: HOSPITALIST

## 2021-02-01 PROCEDURE — 3075F PR MOST RECENT SYSTOLIC BLOOD PRESS GE 130-139MM HG: ICD-10-PCS | Mod: CPTII,S$GLB,, | Performed by: HOSPITALIST

## 2021-02-01 PROCEDURE — 3080F PR MOST RECENT DIASTOLIC BLOOD PRESSURE >= 90 MM HG: ICD-10-PCS | Mod: CPTII,S$GLB,, | Performed by: HOSPITALIST

## 2021-02-01 PROCEDURE — 3046F PR MOST RECENT HEMOGLOBIN A1C LEVEL > 9.0%: ICD-10-PCS | Mod: CPTII,S$GLB,, | Performed by: HOSPITALIST

## 2021-02-01 PROCEDURE — 3046F HEMOGLOBIN A1C LEVEL >9.0%: CPT | Mod: CPTII,S$GLB,, | Performed by: HOSPITALIST

## 2021-02-01 PROCEDURE — 1126F AMNT PAIN NOTED NONE PRSNT: CPT | Mod: S$GLB,,, | Performed by: HOSPITALIST

## 2021-02-01 RX ORDER — PEN NEEDLE, DIABETIC 30 GX3/16"
NEEDLE, DISPOSABLE MISCELLANEOUS
Qty: 100 EACH | Refills: 6 | Status: SHIPPED | OUTPATIENT
Start: 2021-02-01 | End: 2021-02-01 | Stop reason: SDUPTHER

## 2021-02-01 RX ORDER — PEN NEEDLE, DIABETIC 30 GX3/16"
NEEDLE, DISPOSABLE MISCELLANEOUS
Qty: 100 EACH | Refills: 6 | Status: SHIPPED | OUTPATIENT
Start: 2021-02-01 | End: 2023-10-24

## 2021-02-01 RX ORDER — HYDROCHLOROTHIAZIDE 12.5 MG/1
12.5 TABLET ORAL DAILY
Qty: 30 TABLET | Refills: 11 | Status: SHIPPED | OUTPATIENT
Start: 2021-02-01 | End: 2021-02-11

## 2021-02-02 ENCOUNTER — PATIENT MESSAGE (OUTPATIENT)
Dept: NEUROLOGY | Facility: CLINIC | Age: 56
End: 2021-02-02

## 2021-02-02 ENCOUNTER — PATIENT MESSAGE (OUTPATIENT)
Dept: INTERNAL MEDICINE | Facility: CLINIC | Age: 56
End: 2021-02-02

## 2021-02-05 ENCOUNTER — CLINICAL SUPPORT (OUTPATIENT)
Dept: REHABILITATION | Facility: HOSPITAL | Age: 56
End: 2021-02-05
Attending: HOSPITALIST
Payer: COMMERCIAL

## 2021-02-05 DIAGNOSIS — I69.398 LEFT HOMONYMOUS HEMIANOPSIA DUE TO RECENT CEREBRAL INFARCTION: ICD-10-CM

## 2021-02-05 DIAGNOSIS — H53.462 LEFT HOMONYMOUS HEMIANOPSIA DUE TO RECENT CEREBRAL INFARCTION: ICD-10-CM

## 2021-02-05 DIAGNOSIS — R29.898 FINE MOTOR SKILL LOSS: ICD-10-CM

## 2021-02-05 DIAGNOSIS — R20.8 DECREASED SENSATION OF HAND AND ARM: ICD-10-CM

## 2021-02-05 DIAGNOSIS — R29.818 FINE MOTOR SKILL LOSS: ICD-10-CM

## 2021-02-05 DIAGNOSIS — Z78.9 IMPAIRED INSTRUMENTAL ACTIVITIES OF DAILY LIVING (IADL): ICD-10-CM

## 2021-02-05 DIAGNOSIS — R29.898 LEFT HAND WEAKNESS: ICD-10-CM

## 2021-02-05 PROCEDURE — 97110 THERAPEUTIC EXERCISES: CPT | Mod: PO

## 2021-02-05 PROCEDURE — 97530 THERAPEUTIC ACTIVITIES: CPT | Mod: PO

## 2021-02-05 PROCEDURE — 97018 PARAFFIN BATH THERAPY: CPT | Mod: PO

## 2021-02-07 ENCOUNTER — PATIENT MESSAGE (OUTPATIENT)
Dept: INTERNAL MEDICINE | Facility: CLINIC | Age: 56
End: 2021-02-07

## 2021-02-08 ENCOUNTER — PATIENT MESSAGE (OUTPATIENT)
Dept: INTERNAL MEDICINE | Facility: CLINIC | Age: 56
End: 2021-02-08

## 2021-02-08 ENCOUNTER — CLINICAL SUPPORT (OUTPATIENT)
Dept: REHABILITATION | Facility: HOSPITAL | Age: 56
End: 2021-02-08
Payer: COMMERCIAL

## 2021-02-08 DIAGNOSIS — H53.462 LEFT HOMONYMOUS HEMIANOPSIA DUE TO RECENT CEREBRAL INFARCTION: ICD-10-CM

## 2021-02-08 DIAGNOSIS — Z78.9 IMPAIRED INSTRUMENTAL ACTIVITIES OF DAILY LIVING (IADL): ICD-10-CM

## 2021-02-08 DIAGNOSIS — R29.818 FINE MOTOR SKILL LOSS: ICD-10-CM

## 2021-02-08 DIAGNOSIS — I69.398 LEFT HOMONYMOUS HEMIANOPSIA DUE TO RECENT CEREBRAL INFARCTION: ICD-10-CM

## 2021-02-08 DIAGNOSIS — R20.8 DECREASED SENSATION OF HAND AND ARM: ICD-10-CM

## 2021-02-08 DIAGNOSIS — R29.898 LEFT HAND WEAKNESS: ICD-10-CM

## 2021-02-08 DIAGNOSIS — R29.898 FINE MOTOR SKILL LOSS: ICD-10-CM

## 2021-02-08 PROCEDURE — 97530 THERAPEUTIC ACTIVITIES: CPT | Mod: PO

## 2021-02-08 PROCEDURE — 97022 WHIRLPOOL THERAPY: CPT | Mod: PO

## 2021-02-08 PROCEDURE — 97110 THERAPEUTIC EXERCISES: CPT | Mod: PO

## 2021-02-09 ENCOUNTER — PATIENT MESSAGE (OUTPATIENT)
Dept: INTERNAL MEDICINE | Facility: CLINIC | Age: 56
End: 2021-02-09

## 2021-02-11 ENCOUNTER — TELEPHONE (OUTPATIENT)
Dept: INTERNAL MEDICINE | Facility: CLINIC | Age: 56
End: 2021-02-11

## 2021-02-11 RX ORDER — AMLODIPINE BESYLATE 2.5 MG/1
2.5 TABLET ORAL DAILY
Qty: 30 TABLET | Refills: 11 | Status: SHIPPED | OUTPATIENT
Start: 2021-02-11 | End: 2022-04-05 | Stop reason: SDUPTHER

## 2021-02-12 ENCOUNTER — CLINICAL SUPPORT (OUTPATIENT)
Dept: REHABILITATION | Facility: HOSPITAL | Age: 56
End: 2021-02-12
Payer: COMMERCIAL

## 2021-02-12 DIAGNOSIS — R29.898 FINE MOTOR SKILL LOSS: ICD-10-CM

## 2021-02-12 DIAGNOSIS — R29.818 FINE MOTOR SKILL LOSS: ICD-10-CM

## 2021-02-12 DIAGNOSIS — R20.8 DECREASED SENSATION OF HAND AND ARM: ICD-10-CM

## 2021-02-12 DIAGNOSIS — Z78.9 IMPAIRED INSTRUMENTAL ACTIVITIES OF DAILY LIVING (IADL): ICD-10-CM

## 2021-02-12 DIAGNOSIS — R29.898 LEFT HAND WEAKNESS: ICD-10-CM

## 2021-02-12 DIAGNOSIS — H53.462 LEFT HOMONYMOUS HEMIANOPSIA DUE TO RECENT CEREBRAL INFARCTION: ICD-10-CM

## 2021-02-12 DIAGNOSIS — I69.398 LEFT HOMONYMOUS HEMIANOPSIA DUE TO RECENT CEREBRAL INFARCTION: ICD-10-CM

## 2021-02-12 PROCEDURE — 97530 THERAPEUTIC ACTIVITIES: CPT | Mod: PO

## 2021-02-12 PROCEDURE — 97022 WHIRLPOOL THERAPY: CPT | Mod: PO

## 2021-02-12 PROCEDURE — 97110 THERAPEUTIC EXERCISES: CPT | Mod: PO

## 2021-02-17 ENCOUNTER — PATIENT MESSAGE (OUTPATIENT)
Dept: INTERNAL MEDICINE | Facility: CLINIC | Age: 56
End: 2021-02-17

## 2021-02-17 RX ORDER — DULAGLUTIDE 1.5 MG/.5ML
1.5 INJECTION, SOLUTION SUBCUTANEOUS
Qty: 4 PEN | Refills: 6 | Status: SHIPPED | OUTPATIENT
Start: 2021-02-17 | End: 2021-03-19

## 2021-02-18 ENCOUNTER — CLINICAL SUPPORT (OUTPATIENT)
Dept: OPHTHALMOLOGY | Facility: CLINIC | Age: 56
End: 2021-02-18
Payer: COMMERCIAL

## 2021-02-18 ENCOUNTER — OFFICE VISIT (OUTPATIENT)
Dept: OPHTHALMOLOGY | Facility: CLINIC | Age: 56
End: 2021-02-18
Payer: COMMERCIAL

## 2021-02-18 ENCOUNTER — OFFICE VISIT (OUTPATIENT)
Dept: NEUROLOGY | Facility: CLINIC | Age: 56
End: 2021-02-18
Payer: COMMERCIAL

## 2021-02-18 VITALS
DIASTOLIC BLOOD PRESSURE: 89 MMHG | SYSTOLIC BLOOD PRESSURE: 154 MMHG | HEIGHT: 63 IN | BODY MASS INDEX: 30.34 KG/M2 | HEART RATE: 82 BPM

## 2021-02-18 DIAGNOSIS — H53.462 LEFT HOMONYMOUS HEMIANOPSIA DUE TO RECENT CEREBRAL INFARCTION: Primary | ICD-10-CM

## 2021-02-18 DIAGNOSIS — Z79.4 TYPE 2 DIABETES MELLITUS WITH HYPERGLYCEMIA, WITH LONG-TERM CURRENT USE OF INSULIN: ICD-10-CM

## 2021-02-18 DIAGNOSIS — E78.5 HYPERLIPIDEMIA ASSOCIATED WITH TYPE 2 DIABETES MELLITUS: ICD-10-CM

## 2021-02-18 DIAGNOSIS — I15.2 HYPERTENSION ASSOCIATED WITH DIABETES: ICD-10-CM

## 2021-02-18 DIAGNOSIS — E11.65 TYPE 2 DIABETES MELLITUS WITH HYPERGLYCEMIA, WITH LONG-TERM CURRENT USE OF INSULIN: ICD-10-CM

## 2021-02-18 DIAGNOSIS — H53.40 VISUAL FIELD DEFECT: ICD-10-CM

## 2021-02-18 DIAGNOSIS — I63.411 CEREBRAL INFARCTION DUE TO EMBOLISM OF RIGHT MIDDLE CEREBRAL ARTERY: ICD-10-CM

## 2021-02-18 DIAGNOSIS — H53.462 LEFT HOMONYMOUS HEMIANOPSIA DUE TO RECENT CEREBRAL INFARCTION: ICD-10-CM

## 2021-02-18 DIAGNOSIS — E11.59 HYPERTENSION ASSOCIATED WITH DIABETES: ICD-10-CM

## 2021-02-18 DIAGNOSIS — I69.398 LEFT HOMONYMOUS HEMIANOPSIA DUE TO RECENT CEREBRAL INFARCTION: Primary | ICD-10-CM

## 2021-02-18 DIAGNOSIS — I69.398 LEFT HOMONYMOUS HEMIANOPSIA DUE TO RECENT CEREBRAL INFARCTION: ICD-10-CM

## 2021-02-18 DIAGNOSIS — F32.A DEPRESSION, UNSPECIFIED DEPRESSION TYPE: ICD-10-CM

## 2021-02-18 DIAGNOSIS — E11.69 HYPERLIPIDEMIA ASSOCIATED WITH TYPE 2 DIABETES MELLITUS: ICD-10-CM

## 2021-02-18 DIAGNOSIS — H53.40 VISUAL FIELD DEFECT: Primary | ICD-10-CM

## 2021-02-18 PROCEDURE — 1126F AMNT PAIN NOTED NONE PRSNT: CPT | Mod: S$GLB,,, | Performed by: OPHTHALMOLOGY

## 2021-02-18 PROCEDURE — 92004 PR EYE EXAM, NEW PATIENT,COMPREHESV: ICD-10-PCS | Mod: S$GLB,,, | Performed by: OPHTHALMOLOGY

## 2021-02-18 PROCEDURE — 99214 OFFICE O/P EST MOD 30 MIN: CPT | Mod: S$GLB,,, | Performed by: PSYCHIATRY & NEUROLOGY

## 2021-02-18 PROCEDURE — 99999 PR PBB SHADOW E&M-EST. PATIENT-LVL III: CPT | Mod: PBBFAC,,, | Performed by: OPHTHALMOLOGY

## 2021-02-18 PROCEDURE — 99999 PR PBB SHADOW E&M-EST. PATIENT-LVL IV: ICD-10-PCS | Mod: PBBFAC,,, | Performed by: STUDENT IN AN ORGANIZED HEALTH CARE EDUCATION/TRAINING PROGRAM

## 2021-02-18 PROCEDURE — 99214 PR OFFICE/OUTPT VISIT, EST, LEVL IV, 30-39 MIN: ICD-10-PCS | Mod: S$GLB,,, | Performed by: PSYCHIATRY & NEUROLOGY

## 2021-02-18 PROCEDURE — 99999 PR PBB SHADOW E&M-EST. PATIENT-LVL IV: CPT | Mod: PBBFAC,,, | Performed by: STUDENT IN AN ORGANIZED HEALTH CARE EDUCATION/TRAINING PROGRAM

## 2021-02-18 PROCEDURE — 92004 COMPRE OPH EXAM NEW PT 1/>: CPT | Mod: S$GLB,,, | Performed by: OPHTHALMOLOGY

## 2021-02-18 PROCEDURE — 99999 PR PBB SHADOW E&M-EST. PATIENT-LVL III: ICD-10-PCS | Mod: PBBFAC,,, | Performed by: OPHTHALMOLOGY

## 2021-02-18 PROCEDURE — 1126F PR PAIN SEVERITY QUANTIFIED, NO PAIN PRESENT: ICD-10-PCS | Mod: S$GLB,,, | Performed by: OPHTHALMOLOGY

## 2021-02-19 ENCOUNTER — PATIENT MESSAGE (OUTPATIENT)
Dept: REHABILITATION | Facility: HOSPITAL | Age: 56
End: 2021-02-19

## 2021-02-19 ENCOUNTER — PATIENT MESSAGE (OUTPATIENT)
Dept: NEUROLOGY | Facility: CLINIC | Age: 56
End: 2021-02-19

## 2021-02-19 ENCOUNTER — PATIENT MESSAGE (OUTPATIENT)
Dept: ADMINISTRATIVE | Facility: HOSPITAL | Age: 56
End: 2021-02-19

## 2021-02-19 ENCOUNTER — PATIENT MESSAGE (OUTPATIENT)
Dept: OPHTHALMOLOGY | Facility: CLINIC | Age: 56
End: 2021-02-19

## 2021-02-25 ENCOUNTER — TELEPHONE (OUTPATIENT)
Dept: INTERNAL MEDICINE | Facility: CLINIC | Age: 56
End: 2021-02-25

## 2021-02-25 ENCOUNTER — OFFICE VISIT (OUTPATIENT)
Dept: INTERNAL MEDICINE | Facility: CLINIC | Age: 56
End: 2021-02-25
Payer: COMMERCIAL

## 2021-02-25 ENCOUNTER — PATIENT MESSAGE (OUTPATIENT)
Dept: INTERNAL MEDICINE | Facility: CLINIC | Age: 56
End: 2021-02-25

## 2021-02-25 VITALS
HEIGHT: 63 IN | BODY MASS INDEX: 29.46 KG/M2 | OXYGEN SATURATION: 98 % | HEART RATE: 68 BPM | SYSTOLIC BLOOD PRESSURE: 126 MMHG | WEIGHT: 166.25 LBS | DIASTOLIC BLOOD PRESSURE: 80 MMHG | TEMPERATURE: 98 F | RESPIRATION RATE: 16 BRPM

## 2021-02-25 DIAGNOSIS — E11.59 HYPERTENSION ASSOCIATED WITH DIABETES: ICD-10-CM

## 2021-02-25 DIAGNOSIS — I15.2 HYPERTENSION ASSOCIATED WITH DIABETES: ICD-10-CM

## 2021-02-25 DIAGNOSIS — E04.2 MULTINODULAR GOITER: ICD-10-CM

## 2021-02-25 DIAGNOSIS — E11.69 HYPERLIPIDEMIA ASSOCIATED WITH TYPE 2 DIABETES MELLITUS: ICD-10-CM

## 2021-02-25 DIAGNOSIS — E78.5 HYPERLIPIDEMIA ASSOCIATED WITH TYPE 2 DIABETES MELLITUS: ICD-10-CM

## 2021-02-25 DIAGNOSIS — E11.65 TYPE 2 DIABETES MELLITUS WITH HYPERGLYCEMIA, WITH LONG-TERM CURRENT USE OF INSULIN: Primary | ICD-10-CM

## 2021-02-25 DIAGNOSIS — Z79.4 TYPE 2 DIABETES MELLITUS WITH HYPERGLYCEMIA, WITH LONG-TERM CURRENT USE OF INSULIN: Primary | ICD-10-CM

## 2021-02-25 DIAGNOSIS — E05.90 SUBCLINICAL HYPERTHYROIDISM: ICD-10-CM

## 2021-02-25 PROCEDURE — 3074F SYST BP LT 130 MM HG: CPT | Mod: CPTII,S$GLB,, | Performed by: NURSE PRACTITIONER

## 2021-02-25 PROCEDURE — 1126F AMNT PAIN NOTED NONE PRSNT: CPT | Mod: S$GLB,,, | Performed by: NURSE PRACTITIONER

## 2021-02-25 PROCEDURE — 99999 PR PBB SHADOW E&M-EST. PATIENT-LVL V: ICD-10-PCS | Mod: PBBFAC,,, | Performed by: NURSE PRACTITIONER

## 2021-02-25 PROCEDURE — 3079F DIAST BP 80-89 MM HG: CPT | Mod: CPTII,S$GLB,, | Performed by: NURSE PRACTITIONER

## 2021-02-25 PROCEDURE — 99999 PR PBB SHADOW E&M-EST. PATIENT-LVL V: CPT | Mod: PBBFAC,,, | Performed by: NURSE PRACTITIONER

## 2021-02-25 PROCEDURE — 3008F PR BODY MASS INDEX (BMI) DOCUMENTED: ICD-10-PCS | Mod: CPTII,S$GLB,, | Performed by: NURSE PRACTITIONER

## 2021-02-25 PROCEDURE — 99215 OFFICE O/P EST HI 40 MIN: CPT | Mod: S$GLB,,, | Performed by: NURSE PRACTITIONER

## 2021-02-25 PROCEDURE — 3008F BODY MASS INDEX DOCD: CPT | Mod: CPTII,S$GLB,, | Performed by: NURSE PRACTITIONER

## 2021-02-25 PROCEDURE — 3074F PR MOST RECENT SYSTOLIC BLOOD PRESSURE < 130 MM HG: ICD-10-PCS | Mod: CPTII,S$GLB,, | Performed by: NURSE PRACTITIONER

## 2021-02-25 PROCEDURE — 3046F PR MOST RECENT HEMOGLOBIN A1C LEVEL > 9.0%: ICD-10-PCS | Mod: CPTII,S$GLB,, | Performed by: NURSE PRACTITIONER

## 2021-02-25 PROCEDURE — 1126F PR PAIN SEVERITY QUANTIFIED, NO PAIN PRESENT: ICD-10-PCS | Mod: S$GLB,,, | Performed by: NURSE PRACTITIONER

## 2021-02-25 PROCEDURE — 99215 PR OFFICE/OUTPT VISIT, EST, LEVL V, 40-54 MIN: ICD-10-PCS | Mod: S$GLB,,, | Performed by: NURSE PRACTITIONER

## 2021-02-25 PROCEDURE — 3079F PR MOST RECENT DIASTOLIC BLOOD PRESSURE 80-89 MM HG: ICD-10-PCS | Mod: CPTII,S$GLB,, | Performed by: NURSE PRACTITIONER

## 2021-02-25 PROCEDURE — 3046F HEMOGLOBIN A1C LEVEL >9.0%: CPT | Mod: CPTII,S$GLB,, | Performed by: NURSE PRACTITIONER

## 2021-02-25 RX ORDER — FLASH GLUCOSE SENSOR
1 KIT MISCELLANEOUS DAILY
Qty: 2 KIT | Refills: 12 | Status: SHIPPED | OUTPATIENT
Start: 2021-02-25 | End: 2021-02-25 | Stop reason: SDUPTHER

## 2021-02-25 RX ORDER — FLASH GLUCOSE SCANNING READER
1 EACH MISCELLANEOUS CONTINUOUS
Qty: 1 EACH | Refills: 0 | Status: SHIPPED | OUTPATIENT
Start: 2021-02-25 | End: 2022-08-03

## 2021-02-25 RX ORDER — DAPAGLIFLOZIN 5 MG/1
5 TABLET, FILM COATED ORAL DAILY
Qty: 30 TABLET | Refills: 3 | Status: SHIPPED | OUTPATIENT
Start: 2021-02-25 | End: 2021-02-25 | Stop reason: ALTCHOICE

## 2021-02-25 RX ORDER — INSULIN ASPART 100 [IU]/ML
50 INJECTION, SOLUTION INTRAVENOUS; SUBCUTANEOUS CONTINUOUS
Qty: 20 ML | Refills: 6 | Status: SHIPPED | OUTPATIENT
Start: 2021-02-25 | End: 2021-11-04 | Stop reason: SDUPTHER

## 2021-02-25 RX ORDER — SUB-Q INSULIN DEVICE, 40 UNIT
1 EACH MISCELLANEOUS DAILY
Qty: 30 EACH | Refills: 11 | Status: SHIPPED | OUTPATIENT
Start: 2021-02-25 | End: 2021-03-27

## 2021-02-25 RX ORDER — FLASH GLUCOSE SENSOR
1 KIT MISCELLANEOUS DAILY
Qty: 2 KIT | Refills: 12 | Status: SHIPPED | OUTPATIENT
Start: 2021-02-25 | End: 2022-02-08 | Stop reason: SDUPTHER

## 2021-02-25 RX ORDER — FLASH GLUCOSE SCANNING READER
1 EACH MISCELLANEOUS CONTINUOUS
Qty: 1 EACH | Refills: 0 | Status: SHIPPED | OUTPATIENT
Start: 2021-02-25 | End: 2021-02-25 | Stop reason: SDUPTHER

## 2021-02-25 RX ORDER — EMPAGLIFLOZIN 10 MG/1
10 TABLET, FILM COATED ORAL DAILY
Qty: 30 TABLET | Refills: 6 | Status: SHIPPED | OUTPATIENT
Start: 2021-02-25 | End: 2021-02-25

## 2021-02-26 ENCOUNTER — DOCUMENTATION ONLY (OUTPATIENT)
Dept: REHABILITATION | Facility: HOSPITAL | Age: 56
End: 2021-02-26

## 2021-03-03 ENCOUNTER — PATIENT OUTREACH (OUTPATIENT)
Dept: ADMINISTRATIVE | Facility: OTHER | Age: 56
End: 2021-03-03

## 2021-03-05 ENCOUNTER — CLINICAL SUPPORT (OUTPATIENT)
Dept: DIABETES | Facility: CLINIC | Age: 56
End: 2021-03-05
Payer: COMMERCIAL

## 2021-03-05 DIAGNOSIS — E11.65 TYPE 2 DIABETES MELLITUS WITH HYPERGLYCEMIA, WITH LONG-TERM CURRENT USE OF INSULIN: ICD-10-CM

## 2021-03-05 DIAGNOSIS — Z79.4 TYPE 2 DIABETES MELLITUS WITH HYPERGLYCEMIA, WITH LONG-TERM CURRENT USE OF INSULIN: ICD-10-CM

## 2021-03-05 PROCEDURE — 99999 PR PBB SHADOW E&M-EST. PATIENT-LVL I: CPT | Mod: PBBFAC,,, | Performed by: DIETITIAN, REGISTERED

## 2021-03-05 PROCEDURE — G0108 DIAB MANAGE TRN  PER INDIV: HCPCS | Mod: S$GLB,,, | Performed by: DIETITIAN, REGISTERED

## 2021-03-05 PROCEDURE — G0108 PR DIAB MANAGE TRN  PER INDIV: ICD-10-PCS | Mod: S$GLB,,, | Performed by: DIETITIAN, REGISTERED

## 2021-03-05 PROCEDURE — 99999 PR PBB SHADOW E&M-EST. PATIENT-LVL I: ICD-10-PCS | Mod: PBBFAC,,, | Performed by: DIETITIAN, REGISTERED

## 2021-03-11 ENCOUNTER — HOME CARE VISIT (OUTPATIENT)
Dept: NEUROLOGY | Facility: HOSPITAL | Age: 56
End: 2021-03-11

## 2021-03-14 ENCOUNTER — IMMUNIZATION (OUTPATIENT)
Dept: INTERNAL MEDICINE | Facility: CLINIC | Age: 56
End: 2021-03-14
Payer: COMMERCIAL

## 2021-03-14 DIAGNOSIS — Z23 NEED FOR VACCINATION: Primary | ICD-10-CM

## 2021-03-14 PROCEDURE — 0031A COVID-19,VECTOR-NR,RS-AD26,PF,0.5 ML DOSE VACCINE (JANSSEN): CPT | Mod: PBBFAC | Performed by: FAMILY MEDICINE

## 2021-03-19 ENCOUNTER — PATIENT OUTREACH (OUTPATIENT)
Dept: ADMINISTRATIVE | Facility: HOSPITAL | Age: 56
End: 2021-03-19

## 2021-03-24 ENCOUNTER — OFFICE VISIT (OUTPATIENT)
Dept: INTERNAL MEDICINE | Facility: CLINIC | Age: 56
End: 2021-03-24
Payer: COMMERCIAL

## 2021-03-24 ENCOUNTER — HOSPITAL ENCOUNTER (OUTPATIENT)
Dept: RADIOLOGY | Facility: HOSPITAL | Age: 56
Discharge: HOME | End: 2021-03-24
Attending: HOSPITALIST
Payer: COMMERCIAL

## 2021-03-24 ENCOUNTER — PATIENT MESSAGE (OUTPATIENT)
Dept: INTERNAL MEDICINE | Facility: CLINIC | Age: 56
End: 2021-03-24

## 2021-03-24 ENCOUNTER — CLINICAL SUPPORT (OUTPATIENT)
Dept: DIABETES | Facility: CLINIC | Age: 56
End: 2021-03-24
Payer: COMMERCIAL

## 2021-03-24 VITALS
OXYGEN SATURATION: 98 % | WEIGHT: 171.75 LBS | SYSTOLIC BLOOD PRESSURE: 120 MMHG | HEART RATE: 70 BPM | HEIGHT: 63 IN | TEMPERATURE: 98 F | DIASTOLIC BLOOD PRESSURE: 70 MMHG | BODY MASS INDEX: 30.43 KG/M2

## 2021-03-24 DIAGNOSIS — E11.65 TYPE 2 DIABETES MELLITUS WITH HYPERGLYCEMIA, WITH LONG-TERM CURRENT USE OF INSULIN: ICD-10-CM

## 2021-03-24 DIAGNOSIS — I63.411 EMBOLIC STROKE INVOLVING RIGHT MIDDLE CEREBRAL ARTERY: ICD-10-CM

## 2021-03-24 DIAGNOSIS — Z12.31 OTHER SCREENING MAMMOGRAM: ICD-10-CM

## 2021-03-24 DIAGNOSIS — I15.2 HYPERTENSION ASSOCIATED WITH DIABETES: ICD-10-CM

## 2021-03-24 DIAGNOSIS — Z00.00 ANNUAL PHYSICAL EXAM: Primary | ICD-10-CM

## 2021-03-24 DIAGNOSIS — Z01.419 WELL WOMAN EXAM WITH ROUTINE GYNECOLOGICAL EXAM: ICD-10-CM

## 2021-03-24 DIAGNOSIS — E11.59 HYPERTENSION ASSOCIATED WITH DIABETES: ICD-10-CM

## 2021-03-24 DIAGNOSIS — Z79.4 TYPE 2 DIABETES MELLITUS WITH HYPERGLYCEMIA, WITH LONG-TERM CURRENT USE OF INSULIN: ICD-10-CM

## 2021-03-24 DIAGNOSIS — E05.90 SUBCLINICAL HYPERTHYROIDISM: ICD-10-CM

## 2021-03-24 PROCEDURE — 1125F PR PAIN SEVERITY QUANTIFIED, PAIN PRESENT: ICD-10-PCS | Mod: S$GLB,,, | Performed by: HOSPITALIST

## 2021-03-24 PROCEDURE — 77067 MAMMO DIGITAL SCREENING BILAT WITH TOMO: ICD-10-PCS | Mod: 26,,, | Performed by: RADIOLOGY

## 2021-03-24 PROCEDURE — G0108 DIAB MANAGE TRN  PER INDIV: HCPCS | Mod: S$GLB,,, | Performed by: DIETITIAN, REGISTERED

## 2021-03-24 PROCEDURE — 99396 PR PREVENTIVE VISIT,EST,40-64: ICD-10-PCS | Mod: S$GLB,,, | Performed by: HOSPITALIST

## 2021-03-24 PROCEDURE — 3008F BODY MASS INDEX DOCD: CPT | Mod: CPTII,S$GLB,, | Performed by: HOSPITALIST

## 2021-03-24 PROCEDURE — 99999 PR PBB SHADOW E&M-EST. PATIENT-LVL V: CPT | Mod: PBBFAC,,, | Performed by: HOSPITALIST

## 2021-03-24 PROCEDURE — 3046F HEMOGLOBIN A1C LEVEL >9.0%: CPT | Mod: CPTII,S$GLB,, | Performed by: HOSPITALIST

## 2021-03-24 PROCEDURE — 77063 MAMMO DIGITAL SCREENING BILAT WITH TOMO: ICD-10-PCS | Mod: 26,,, | Performed by: RADIOLOGY

## 2021-03-24 PROCEDURE — 1125F AMNT PAIN NOTED PAIN PRSNT: CPT | Mod: S$GLB,,, | Performed by: HOSPITALIST

## 2021-03-24 PROCEDURE — 3046F PR MOST RECENT HEMOGLOBIN A1C LEVEL > 9.0%: ICD-10-PCS | Mod: CPTII,S$GLB,, | Performed by: HOSPITALIST

## 2021-03-24 PROCEDURE — 99999 PR PBB SHADOW E&M-EST. PATIENT-LVL V: ICD-10-PCS | Mod: PBBFAC,,, | Performed by: HOSPITALIST

## 2021-03-24 PROCEDURE — 3078F DIAST BP <80 MM HG: CPT | Mod: CPTII,S$GLB,, | Performed by: HOSPITALIST

## 2021-03-24 PROCEDURE — G0108 PR DIAB MANAGE TRN  PER INDIV: ICD-10-PCS | Mod: S$GLB,,, | Performed by: DIETITIAN, REGISTERED

## 2021-03-24 PROCEDURE — 77067 SCR MAMMO BI INCL CAD: CPT | Mod: TC,PO

## 2021-03-24 PROCEDURE — 3008F PR BODY MASS INDEX (BMI) DOCUMENTED: ICD-10-PCS | Mod: CPTII,S$GLB,, | Performed by: HOSPITALIST

## 2021-03-24 PROCEDURE — 3078F PR MOST RECENT DIASTOLIC BLOOD PRESSURE < 80 MM HG: ICD-10-PCS | Mod: CPTII,S$GLB,, | Performed by: HOSPITALIST

## 2021-03-24 PROCEDURE — 77067 SCR MAMMO BI INCL CAD: CPT | Mod: 26,,, | Performed by: RADIOLOGY

## 2021-03-24 PROCEDURE — 3074F SYST BP LT 130 MM HG: CPT | Mod: CPTII,S$GLB,, | Performed by: HOSPITALIST

## 2021-03-24 PROCEDURE — 77063 BREAST TOMOSYNTHESIS BI: CPT | Mod: 26,,, | Performed by: RADIOLOGY

## 2021-03-24 PROCEDURE — 3074F PR MOST RECENT SYSTOLIC BLOOD PRESSURE < 130 MM HG: ICD-10-PCS | Mod: CPTII,S$GLB,, | Performed by: HOSPITALIST

## 2021-03-24 PROCEDURE — 99396 PREV VISIT EST AGE 40-64: CPT | Mod: S$GLB,,, | Performed by: HOSPITALIST

## 2021-03-24 RX ORDER — DULAGLUTIDE 1.5 MG/.5ML
INJECTION, SOLUTION SUBCUTANEOUS
COMMUNITY
Start: 2021-03-22 | End: 2021-07-08

## 2021-03-31 ENCOUNTER — LAB VISIT (OUTPATIENT)
Dept: LAB | Facility: HOSPITAL | Age: 56
End: 2021-03-31
Attending: HOSPITALIST
Payer: COMMERCIAL

## 2021-03-31 DIAGNOSIS — Z00.00 ANNUAL PHYSICAL EXAM: ICD-10-CM

## 2021-03-31 LAB
ALBUMIN SERPL BCP-MCNC: 3.5 G/DL (ref 3.5–5.2)
ALP SERPL-CCNC: 62 U/L (ref 55–135)
ALT SERPL W/O P-5'-P-CCNC: 15 U/L (ref 10–44)
ANION GAP SERPL CALC-SCNC: 8 MMOL/L (ref 8–16)
AST SERPL-CCNC: 15 U/L (ref 10–40)
BASOPHILS # BLD AUTO: 0.04 K/UL (ref 0–0.2)
BASOPHILS NFR BLD: 0.7 % (ref 0–1.9)
BILIRUB SERPL-MCNC: 0.4 MG/DL (ref 0.1–1)
BUN SERPL-MCNC: 17 MG/DL (ref 6–20)
CALCIUM SERPL-MCNC: 9 MG/DL (ref 8.7–10.5)
CHLORIDE SERPL-SCNC: 107 MMOL/L (ref 95–110)
CHOLEST SERPL-MCNC: 143 MG/DL (ref 120–199)
CHOLEST/HDLC SERPL: 3.1 {RATIO} (ref 2–5)
CO2 SERPL-SCNC: 28 MMOL/L (ref 23–29)
CREAT SERPL-MCNC: 0.7 MG/DL (ref 0.5–1.4)
DIFFERENTIAL METHOD: NORMAL
EOSINOPHIL # BLD AUTO: 0.1 K/UL (ref 0–0.5)
EOSINOPHIL NFR BLD: 2.5 % (ref 0–8)
ERYTHROCYTE [DISTWIDTH] IN BLOOD BY AUTOMATED COUNT: 13 % (ref 11.5–14.5)
EST. GFR  (AFRICAN AMERICAN): >60 ML/MIN/1.73 M^2
EST. GFR  (NON AFRICAN AMERICAN): >60 ML/MIN/1.73 M^2
ESTIMATED AVG GLUCOSE: 180 MG/DL (ref 68–131)
GLUCOSE SERPL-MCNC: 106 MG/DL (ref 70–110)
HBA1C MFR BLD: 7.9 % (ref 4–5.6)
HCT VFR BLD AUTO: 38.5 % (ref 37–48.5)
HDLC SERPL-MCNC: 46 MG/DL (ref 40–75)
HDLC SERPL: 32.2 % (ref 20–50)
HGB BLD-MCNC: 12.6 G/DL (ref 12–16)
IMM GRANULOCYTES # BLD AUTO: 0.01 K/UL (ref 0–0.04)
IMM GRANULOCYTES NFR BLD AUTO: 0.2 % (ref 0–0.5)
LDLC SERPL CALC-MCNC: 85 MG/DL (ref 63–159)
LYMPHOCYTES # BLD AUTO: 2.2 K/UL (ref 1–4.8)
LYMPHOCYTES NFR BLD: 39.2 % (ref 18–48)
MCH RBC QN AUTO: 29.6 PG (ref 27–31)
MCHC RBC AUTO-ENTMCNC: 32.7 G/DL (ref 32–36)
MCV RBC AUTO: 91 FL (ref 82–98)
MONOCYTES # BLD AUTO: 0.5 K/UL (ref 0.3–1)
MONOCYTES NFR BLD: 8.4 % (ref 4–15)
NEUTROPHILS # BLD AUTO: 2.8 K/UL (ref 1.8–7.7)
NEUTROPHILS NFR BLD: 49 % (ref 38–73)
NONHDLC SERPL-MCNC: 97 MG/DL
NRBC BLD-RTO: 0 /100 WBC
PLATELET # BLD AUTO: 304 K/UL (ref 150–450)
PMV BLD AUTO: 10.9 FL (ref 9.2–12.9)
POTASSIUM SERPL-SCNC: 4.6 MMOL/L (ref 3.5–5.1)
PROT SERPL-MCNC: 6.5 G/DL (ref 6–8.4)
RBC # BLD AUTO: 4.25 M/UL (ref 4–5.4)
SODIUM SERPL-SCNC: 143 MMOL/L (ref 136–145)
TRIGL SERPL-MCNC: 60 MG/DL (ref 30–150)
TSH SERPL DL<=0.005 MIU/L-ACNC: 3.66 UIU/ML (ref 0.4–4)
WBC # BLD AUTO: 5.69 K/UL (ref 3.9–12.7)

## 2021-03-31 PROCEDURE — 36415 COLL VENOUS BLD VENIPUNCTURE: CPT | Mod: PO | Performed by: HOSPITALIST

## 2021-03-31 PROCEDURE — 80061 LIPID PANEL: CPT | Performed by: HOSPITALIST

## 2021-03-31 PROCEDURE — 84443 ASSAY THYROID STIM HORMONE: CPT | Performed by: HOSPITALIST

## 2021-03-31 PROCEDURE — 85025 COMPLETE CBC W/AUTO DIFF WBC: CPT | Performed by: HOSPITALIST

## 2021-03-31 PROCEDURE — 80053 COMPREHEN METABOLIC PANEL: CPT | Performed by: HOSPITALIST

## 2021-03-31 PROCEDURE — 83036 HEMOGLOBIN GLYCOSYLATED A1C: CPT | Performed by: HOSPITALIST

## 2021-04-01 ENCOUNTER — PATIENT MESSAGE (OUTPATIENT)
Dept: INTERNAL MEDICINE | Facility: CLINIC | Age: 56
End: 2021-04-01

## 2021-04-05 ENCOUNTER — PATIENT OUTREACH (OUTPATIENT)
Dept: ADMINISTRATIVE | Facility: OTHER | Age: 56
End: 2021-04-05

## 2021-04-05 ENCOUNTER — PATIENT MESSAGE (OUTPATIENT)
Dept: ADMINISTRATIVE | Facility: HOSPITAL | Age: 56
End: 2021-04-05

## 2021-04-06 ENCOUNTER — OFFICE VISIT (OUTPATIENT)
Dept: OBSTETRICS AND GYNECOLOGY | Facility: CLINIC | Age: 56
End: 2021-04-06
Payer: COMMERCIAL

## 2021-04-06 VITALS
WEIGHT: 170.63 LBS | BODY MASS INDEX: 30.23 KG/M2 | DIASTOLIC BLOOD PRESSURE: 60 MMHG | SYSTOLIC BLOOD PRESSURE: 120 MMHG | HEIGHT: 63 IN

## 2021-04-06 DIAGNOSIS — Z11.51 SCREENING FOR HPV (HUMAN PAPILLOMAVIRUS): ICD-10-CM

## 2021-04-06 DIAGNOSIS — Z01.419 WOMEN'S ANNUAL ROUTINE GYNECOLOGICAL EXAMINATION: Primary | ICD-10-CM

## 2021-04-06 DIAGNOSIS — Z12.4 ENCOUNTER FOR PAPANICOLAOU SMEAR FOR CERVICAL CANCER SCREENING: ICD-10-CM

## 2021-04-06 DIAGNOSIS — Z12.31 ENCOUNTER FOR SCREENING MAMMOGRAM FOR BREAST CANCER: ICD-10-CM

## 2021-04-06 DIAGNOSIS — Z12.11 COLON CANCER SCREENING: ICD-10-CM

## 2021-04-06 DIAGNOSIS — Z01.419 WELL WOMAN EXAM WITH ROUTINE GYNECOLOGICAL EXAM: ICD-10-CM

## 2021-04-06 PROCEDURE — 99999 PR PBB SHADOW E&M-EST. PATIENT-LVL IV: ICD-10-PCS | Mod: PBBFAC,,, | Performed by: NURSE PRACTITIONER

## 2021-04-06 PROCEDURE — 99999 PR PBB SHADOW E&M-EST. PATIENT-LVL IV: CPT | Mod: PBBFAC,,, | Performed by: NURSE PRACTITIONER

## 2021-04-06 PROCEDURE — 87624 HPV HI-RISK TYP POOLED RSLT: CPT | Performed by: NURSE PRACTITIONER

## 2021-04-06 PROCEDURE — 3074F SYST BP LT 130 MM HG: CPT | Mod: CPTII,S$GLB,, | Performed by: NURSE PRACTITIONER

## 2021-04-06 PROCEDURE — 3078F PR MOST RECENT DIASTOLIC BLOOD PRESSURE < 80 MM HG: ICD-10-PCS | Mod: CPTII,S$GLB,, | Performed by: NURSE PRACTITIONER

## 2021-04-06 PROCEDURE — 99386 PREV VISIT NEW AGE 40-64: CPT | Mod: S$GLB,,, | Performed by: NURSE PRACTITIONER

## 2021-04-06 PROCEDURE — 3078F DIAST BP <80 MM HG: CPT | Mod: CPTII,S$GLB,, | Performed by: NURSE PRACTITIONER

## 2021-04-06 PROCEDURE — 1126F PR PAIN SEVERITY QUANTIFIED, NO PAIN PRESENT: ICD-10-PCS | Mod: S$GLB,,, | Performed by: NURSE PRACTITIONER

## 2021-04-06 PROCEDURE — 3074F PR MOST RECENT SYSTOLIC BLOOD PRESSURE < 130 MM HG: ICD-10-PCS | Mod: CPTII,S$GLB,, | Performed by: NURSE PRACTITIONER

## 2021-04-06 PROCEDURE — 3008F PR BODY MASS INDEX (BMI) DOCUMENTED: ICD-10-PCS | Mod: CPTII,S$GLB,, | Performed by: NURSE PRACTITIONER

## 2021-04-06 PROCEDURE — 99386 PR PREVENTIVE VISIT,NEW,40-64: ICD-10-PCS | Mod: S$GLB,,, | Performed by: NURSE PRACTITIONER

## 2021-04-06 PROCEDURE — 1126F AMNT PAIN NOTED NONE PRSNT: CPT | Mod: S$GLB,,, | Performed by: NURSE PRACTITIONER

## 2021-04-06 PROCEDURE — 3008F BODY MASS INDEX DOCD: CPT | Mod: CPTII,S$GLB,, | Performed by: NURSE PRACTITIONER

## 2021-04-06 PROCEDURE — 88175 CYTOPATH C/V AUTO FLUID REDO: CPT | Performed by: NURSE PRACTITIONER

## 2021-04-08 ENCOUNTER — OFFICE VISIT (OUTPATIENT)
Dept: INTERNAL MEDICINE | Facility: CLINIC | Age: 56
End: 2021-04-08
Payer: COMMERCIAL

## 2021-04-08 VITALS
HEIGHT: 63 IN | WEIGHT: 168.88 LBS | BODY MASS INDEX: 29.92 KG/M2 | SYSTOLIC BLOOD PRESSURE: 140 MMHG | RESPIRATION RATE: 16 BRPM | DIASTOLIC BLOOD PRESSURE: 90 MMHG | OXYGEN SATURATION: 98 % | TEMPERATURE: 98 F | HEART RATE: 78 BPM

## 2021-04-08 DIAGNOSIS — E66.01 SEVERE OBESITY: ICD-10-CM

## 2021-04-08 DIAGNOSIS — E05.90 SUBCLINICAL HYPERTHYROIDISM: ICD-10-CM

## 2021-04-08 DIAGNOSIS — E11.59 HYPERTENSION ASSOCIATED WITH DIABETES: ICD-10-CM

## 2021-04-08 DIAGNOSIS — Z79.4 TYPE 2 DIABETES MELLITUS WITH HYPERGLYCEMIA, WITH LONG-TERM CURRENT USE OF INSULIN: ICD-10-CM

## 2021-04-08 DIAGNOSIS — E11.69 HYPERLIPIDEMIA ASSOCIATED WITH TYPE 2 DIABETES MELLITUS: Primary | ICD-10-CM

## 2021-04-08 DIAGNOSIS — E11.65 TYPE 2 DIABETES MELLITUS WITH HYPERGLYCEMIA, WITH LONG-TERM CURRENT USE OF INSULIN: ICD-10-CM

## 2021-04-08 DIAGNOSIS — E78.5 HYPERLIPIDEMIA ASSOCIATED WITH TYPE 2 DIABETES MELLITUS: Primary | ICD-10-CM

## 2021-04-08 DIAGNOSIS — I15.2 HYPERTENSION ASSOCIATED WITH DIABETES: ICD-10-CM

## 2021-04-08 PROCEDURE — 95251 PR GLUCOSE MONITOR, 72 HOUR, PHYS INTERP: ICD-10-PCS | Mod: S$GLB,,, | Performed by: NURSE PRACTITIONER

## 2021-04-08 PROCEDURE — 1125F PR PAIN SEVERITY QUANTIFIED, PAIN PRESENT: ICD-10-PCS | Mod: S$GLB,,, | Performed by: NURSE PRACTITIONER

## 2021-04-08 PROCEDURE — 99215 PR OFFICE/OUTPT VISIT, EST, LEVL V, 40-54 MIN: ICD-10-PCS | Mod: S$GLB,,, | Performed by: NURSE PRACTITIONER

## 2021-04-08 PROCEDURE — 3008F BODY MASS INDEX DOCD: CPT | Mod: CPTII,S$GLB,, | Performed by: NURSE PRACTITIONER

## 2021-04-08 PROCEDURE — 99999 PR PBB SHADOW E&M-EST. PATIENT-LVL IV: ICD-10-PCS | Mod: PBBFAC,,, | Performed by: NURSE PRACTITIONER

## 2021-04-08 PROCEDURE — 99215 OFFICE O/P EST HI 40 MIN: CPT | Mod: S$GLB,,, | Performed by: NURSE PRACTITIONER

## 2021-04-08 PROCEDURE — 3051F HG A1C>EQUAL 7.0%<8.0%: CPT | Mod: CPTII,S$GLB,, | Performed by: NURSE PRACTITIONER

## 2021-04-08 PROCEDURE — 1125F AMNT PAIN NOTED PAIN PRSNT: CPT | Mod: S$GLB,,, | Performed by: NURSE PRACTITIONER

## 2021-04-08 PROCEDURE — 99999 PR PBB SHADOW E&M-EST. PATIENT-LVL IV: CPT | Mod: PBBFAC,,, | Performed by: NURSE PRACTITIONER

## 2021-04-08 PROCEDURE — 95251 CONT GLUC MNTR ANALYSIS I&R: CPT | Mod: S$GLB,,, | Performed by: NURSE PRACTITIONER

## 2021-04-08 PROCEDURE — 3008F PR BODY MASS INDEX (BMI) DOCUMENTED: ICD-10-PCS | Mod: CPTII,S$GLB,, | Performed by: NURSE PRACTITIONER

## 2021-04-08 PROCEDURE — 3051F PR MOST RECENT HEMOGLOBIN A1C LEVEL 7.0 - < 8.0%: ICD-10-PCS | Mod: CPTII,S$GLB,, | Performed by: NURSE PRACTITIONER

## 2021-04-08 RX ORDER — GLUCAGON 3 MG/1
1 POWDER NASAL
Qty: 2 EACH | Refills: 1 | Status: SHIPPED | OUTPATIENT
Start: 2021-04-08 | End: 2021-05-08

## 2021-04-12 ENCOUNTER — PATIENT MESSAGE (OUTPATIENT)
Dept: INTERNAL MEDICINE | Facility: CLINIC | Age: 56
End: 2021-04-12

## 2021-04-12 ENCOUNTER — LAB VISIT (OUTPATIENT)
Dept: LAB | Facility: HOSPITAL | Age: 56
End: 2021-04-12
Attending: HOSPITALIST
Payer: COMMERCIAL

## 2021-04-12 DIAGNOSIS — Z12.11 COLON CANCER SCREENING: ICD-10-CM

## 2021-04-12 LAB
CLINICAL INFO: NORMAL
CYTO CVX: NORMAL
CYTOLOGIST CVX/VAG CYTO: NORMAL
CYTOLOGY CMNT CVX/VAG CYTO-IMP: NORMAL
CYTOLOGY PAP THIN PREP EXPLANATION: NORMAL
DATE OF PREVIOUS PAP: NORMAL
DATE PREVIOUS BX: NO
HPV I/H RISK 4 DNA CVX QL NAA+PROBE: NOT DETECTED
LMP START DATE: NORMAL
SPECIMEN SOURCE CVX/VAG CYTO: NORMAL
STAT OF ADQ CVX/VAG CYTO-IMP: NORMAL

## 2021-04-12 PROCEDURE — 82274 ASSAY TEST FOR BLOOD FECAL: CPT | Performed by: NURSE PRACTITIONER

## 2021-04-13 ENCOUNTER — PATIENT MESSAGE (OUTPATIENT)
Dept: INTERNAL MEDICINE | Facility: CLINIC | Age: 56
End: 2021-04-13

## 2021-04-13 ENCOUNTER — OFFICE VISIT (OUTPATIENT)
Dept: URGENT CARE | Facility: CLINIC | Age: 56
End: 2021-04-13
Payer: COMMERCIAL

## 2021-04-13 VITALS
HEIGHT: 63 IN | WEIGHT: 166 LBS | SYSTOLIC BLOOD PRESSURE: 161 MMHG | OXYGEN SATURATION: 97 % | TEMPERATURE: 97 F | DIASTOLIC BLOOD PRESSURE: 90 MMHG | HEART RATE: 87 BPM | BODY MASS INDEX: 29.41 KG/M2

## 2021-04-13 DIAGNOSIS — R10.9 ABDOMINAL PAIN, UNSPECIFIED ABDOMINAL LOCATION: Primary | ICD-10-CM

## 2021-04-13 DIAGNOSIS — R10.32 LEFT LOWER QUADRANT ABDOMINAL PAIN: Primary | ICD-10-CM

## 2021-04-13 LAB
BILIRUB UR QL STRIP: NEGATIVE
GLUCOSE UR QL STRIP: NEGATIVE
KETONES UR QL STRIP: NEGATIVE
LEUKOCYTE ESTERASE UR QL STRIP: NEGATIVE
PH, POC UA: 6 (ref 5–8)
POC BLOOD, URINE: NEGATIVE
POC NITRATES, URINE: NEGATIVE
PROT UR QL STRIP: NEGATIVE
SP GR UR STRIP: 1 (ref 1–1.03)
UROBILINOGEN UR STRIP-ACNC: NORMAL (ref 0.1–1.1)

## 2021-04-13 PROCEDURE — 99214 PR OFFICE/OUTPT VISIT, EST, LEVL IV, 30-39 MIN: ICD-10-PCS | Mod: 25,S$GLB,, | Performed by: FAMILY MEDICINE

## 2021-04-13 PROCEDURE — 3008F BODY MASS INDEX DOCD: CPT | Mod: CPTII,S$GLB,, | Performed by: FAMILY MEDICINE

## 2021-04-13 PROCEDURE — 81003 URINALYSIS AUTO W/O SCOPE: CPT | Mod: QW,S$GLB,, | Performed by: FAMILY MEDICINE

## 2021-04-13 PROCEDURE — 74018 XR KUB: ICD-10-PCS | Mod: S$GLB,,, | Performed by: RADIOLOGY

## 2021-04-13 PROCEDURE — 3008F PR BODY MASS INDEX (BMI) DOCUMENTED: ICD-10-PCS | Mod: CPTII,S$GLB,, | Performed by: FAMILY MEDICINE

## 2021-04-13 PROCEDURE — 99214 OFFICE O/P EST MOD 30 MIN: CPT | Mod: 25,S$GLB,, | Performed by: FAMILY MEDICINE

## 2021-04-13 PROCEDURE — 81003 POCT URINALYSIS, DIPSTICK, AUTOMATED, W/O SCOPE: ICD-10-PCS | Mod: QW,S$GLB,, | Performed by: FAMILY MEDICINE

## 2021-04-13 PROCEDURE — 74018 RADEX ABDOMEN 1 VIEW: CPT | Mod: S$GLB,,, | Performed by: RADIOLOGY

## 2021-04-14 ENCOUNTER — HOSPITAL ENCOUNTER (OUTPATIENT)
Dept: RADIOLOGY | Facility: HOSPITAL | Age: 56
Discharge: HOME | End: 2021-04-14
Attending: HOSPITALIST
Payer: COMMERCIAL

## 2021-04-14 DIAGNOSIS — R10.32 LEFT LOWER QUADRANT ABDOMINAL PAIN: ICD-10-CM

## 2021-04-14 LAB — HEMOCCULT STL QL IA: NEGATIVE

## 2021-04-14 PROCEDURE — 74176 CT ABDOMEN PELVIS WITHOUT CONTRAST: ICD-10-PCS | Mod: 26,,, | Performed by: RADIOLOGY

## 2021-04-14 PROCEDURE — 74176 CT ABD & PELVIS W/O CONTRAST: CPT | Mod: TC

## 2021-04-14 PROCEDURE — 74176 CT ABD & PELVIS W/O CONTRAST: CPT | Mod: 26,,, | Performed by: RADIOLOGY

## 2021-04-15 ENCOUNTER — TELEPHONE (OUTPATIENT)
Dept: URGENT CARE | Facility: CLINIC | Age: 56
End: 2021-04-15

## 2021-04-15 ENCOUNTER — PATIENT MESSAGE (OUTPATIENT)
Dept: INTERNAL MEDICINE | Facility: CLINIC | Age: 56
End: 2021-04-15

## 2021-04-15 VITALS
HEART RATE: 68 BPM | OXYGEN SATURATION: 99 % | DIASTOLIC BLOOD PRESSURE: 88 MMHG | RESPIRATION RATE: 20 BRPM | SYSTOLIC BLOOD PRESSURE: 138 MMHG

## 2021-04-15 RX ORDER — TRAMADOL HYDROCHLORIDE 50 MG/1
50 TABLET ORAL EVERY 6 HOURS PRN
Qty: 12 TABLET | Refills: 0 | Status: SHIPPED | OUTPATIENT
Start: 2021-04-15 | End: 2021-05-03 | Stop reason: ALTCHOICE

## 2021-04-15 RX ORDER — POLYETHYLENE GLYCOL 3350 17 G/17G
17 POWDER, FOR SOLUTION ORAL DAILY
Qty: 30 PACKET | Refills: 2 | Status: SHIPPED | OUTPATIENT
Start: 2021-04-15 | End: 2021-07-08

## 2021-04-26 ENCOUNTER — PATIENT MESSAGE (OUTPATIENT)
Dept: INTERNAL MEDICINE | Facility: CLINIC | Age: 56
End: 2021-04-26

## 2021-04-27 ENCOUNTER — HOME CARE VISIT (OUTPATIENT)
Dept: NEUROLOGY | Facility: HOSPITAL | Age: 56
End: 2021-04-27

## 2021-04-27 ENCOUNTER — PATIENT MESSAGE (OUTPATIENT)
Dept: INTERNAL MEDICINE | Facility: CLINIC | Age: 56
End: 2021-04-27

## 2021-04-28 VITALS
SYSTOLIC BLOOD PRESSURE: 138 MMHG | RESPIRATION RATE: 20 BRPM | DIASTOLIC BLOOD PRESSURE: 88 MMHG | HEART RATE: 82 BPM | OXYGEN SATURATION: 99 %

## 2021-04-30 DIAGNOSIS — E11.9 TYPE 2 DIABETES MELLITUS WITHOUT COMPLICATION: ICD-10-CM

## 2021-04-30 RX ORDER — METFORMIN HYDROCHLORIDE 1000 MG/1
1000 TABLET ORAL 2 TIMES DAILY WITH MEALS
Qty: 180 TABLET | Refills: 3 | Status: SHIPPED | OUTPATIENT
Start: 2021-04-30 | End: 2023-03-08 | Stop reason: SDUPTHER

## 2021-05-02 ENCOUNTER — PATIENT MESSAGE (OUTPATIENT)
Dept: INTERNAL MEDICINE | Facility: CLINIC | Age: 56
End: 2021-05-02

## 2021-05-03 ENCOUNTER — PATIENT MESSAGE (OUTPATIENT)
Dept: INTERNAL MEDICINE | Facility: CLINIC | Age: 56
End: 2021-05-03

## 2021-05-03 ENCOUNTER — OFFICE VISIT (OUTPATIENT)
Dept: INTERNAL MEDICINE | Facility: CLINIC | Age: 56
End: 2021-05-03
Payer: COMMERCIAL

## 2021-05-03 VITALS
WEIGHT: 164.44 LBS | HEART RATE: 78 BPM | DIASTOLIC BLOOD PRESSURE: 90 MMHG | HEIGHT: 63 IN | TEMPERATURE: 97 F | SYSTOLIC BLOOD PRESSURE: 130 MMHG | OXYGEN SATURATION: 100 % | BODY MASS INDEX: 29.14 KG/M2

## 2021-05-03 DIAGNOSIS — M79.672 LEFT FOOT PAIN: ICD-10-CM

## 2021-05-03 DIAGNOSIS — M54.50 ACUTE MIDLINE LOW BACK PAIN WITHOUT SCIATICA: Primary | ICD-10-CM

## 2021-05-03 DIAGNOSIS — M62.830 BACK MUSCLE SPASM: ICD-10-CM

## 2021-05-03 DIAGNOSIS — M77.32 HEEL SPUR, LEFT: ICD-10-CM

## 2021-05-03 PROCEDURE — 3008F PR BODY MASS INDEX (BMI) DOCUMENTED: ICD-10-PCS | Mod: CPTII,S$GLB,, | Performed by: INTERNAL MEDICINE

## 2021-05-03 PROCEDURE — 99999 PR PBB SHADOW E&M-EST. PATIENT-LVL V: ICD-10-PCS | Mod: PBBFAC,,, | Performed by: INTERNAL MEDICINE

## 2021-05-03 PROCEDURE — 1125F AMNT PAIN NOTED PAIN PRSNT: CPT | Mod: S$GLB,,, | Performed by: INTERNAL MEDICINE

## 2021-05-03 PROCEDURE — 99999 PR PBB SHADOW E&M-EST. PATIENT-LVL V: CPT | Mod: PBBFAC,,, | Performed by: INTERNAL MEDICINE

## 2021-05-03 PROCEDURE — 99213 OFFICE O/P EST LOW 20 MIN: CPT | Mod: S$GLB,,, | Performed by: INTERNAL MEDICINE

## 2021-05-03 PROCEDURE — 1125F PR PAIN SEVERITY QUANTIFIED, PAIN PRESENT: ICD-10-PCS | Mod: S$GLB,,, | Performed by: INTERNAL MEDICINE

## 2021-05-03 PROCEDURE — 99213 PR OFFICE/OUTPT VISIT, EST, LEVL III, 20-29 MIN: ICD-10-PCS | Mod: S$GLB,,, | Performed by: INTERNAL MEDICINE

## 2021-05-03 PROCEDURE — 3008F BODY MASS INDEX DOCD: CPT | Mod: CPTII,S$GLB,, | Performed by: INTERNAL MEDICINE

## 2021-05-03 RX ORDER — CYCLOBENZAPRINE HCL 10 MG
10 TABLET ORAL 3 TIMES DAILY
Qty: 30 TABLET | Refills: 0 | Status: SHIPPED | OUTPATIENT
Start: 2021-05-03 | End: 2021-05-13

## 2021-05-03 RX ORDER — MELOXICAM 7.5 MG/1
15 TABLET ORAL DAILY
Qty: 20 TABLET | Refills: 1 | Status: SHIPPED | OUTPATIENT
Start: 2021-05-03 | End: 2021-07-08

## 2021-05-03 RX ORDER — ACETAMINOPHEN AND CODEINE PHOSPHATE 300; 60 MG/1; MG/1
1 TABLET ORAL EVERY 8 HOURS PRN
Qty: 30 TABLET | Refills: 1 | Status: SHIPPED | OUTPATIENT
Start: 2021-05-03 | End: 2021-05-13

## 2021-05-04 ENCOUNTER — HOSPITAL ENCOUNTER (OUTPATIENT)
Dept: RADIOLOGY | Facility: HOSPITAL | Age: 56
Discharge: HOME | End: 2021-05-04
Attending: INTERNAL MEDICINE
Payer: COMMERCIAL

## 2021-05-04 DIAGNOSIS — M54.50 ACUTE MIDLINE LOW BACK PAIN WITHOUT SCIATICA: ICD-10-CM

## 2021-05-04 DIAGNOSIS — M62.830 BACK MUSCLE SPASM: ICD-10-CM

## 2021-05-04 DIAGNOSIS — M79.672 LEFT FOOT PAIN: ICD-10-CM

## 2021-05-04 PROCEDURE — 72100 X-RAY EXAM L-S SPINE 2/3 VWS: CPT | Mod: 26,,, | Performed by: RADIOLOGY

## 2021-05-04 PROCEDURE — 72100 X-RAY EXAM L-S SPINE 2/3 VWS: CPT | Mod: TC,FY,PO

## 2021-05-04 PROCEDURE — 73630 XR FOOT COMPLETE 3 VIEW LEFT: ICD-10-PCS | Mod: 26,LT,, | Performed by: RADIOLOGY

## 2021-05-04 PROCEDURE — 73630 X-RAY EXAM OF FOOT: CPT | Mod: 26,LT,, | Performed by: RADIOLOGY

## 2021-05-04 PROCEDURE — 72100 XR LUMBAR SPINE AP AND LATERAL: ICD-10-PCS | Mod: 26,,, | Performed by: RADIOLOGY

## 2021-05-04 PROCEDURE — 73630 X-RAY EXAM OF FOOT: CPT | Mod: TC,FY,PO,LT

## 2021-05-05 ENCOUNTER — TELEPHONE (OUTPATIENT)
Dept: INTERNAL MEDICINE | Facility: CLINIC | Age: 56
End: 2021-05-05

## 2021-05-14 ENCOUNTER — PATIENT MESSAGE (OUTPATIENT)
Dept: ADMINISTRATIVE | Facility: HOSPITAL | Age: 56
End: 2021-05-14

## 2021-05-21 ENCOUNTER — TELEPHONE (OUTPATIENT)
Dept: INTERNAL MEDICINE | Facility: CLINIC | Age: 56
End: 2021-05-21

## 2021-05-21 ENCOUNTER — PATIENT OUTREACH (OUTPATIENT)
Dept: ADMINISTRATIVE | Facility: HOSPITAL | Age: 56
End: 2021-05-21

## 2021-05-25 ENCOUNTER — OFFICE VISIT (OUTPATIENT)
Dept: PODIATRY | Facility: CLINIC | Age: 56
End: 2021-05-25
Payer: COMMERCIAL

## 2021-05-25 VITALS
WEIGHT: 164 LBS | BODY MASS INDEX: 29.06 KG/M2 | HEIGHT: 63 IN | SYSTOLIC BLOOD PRESSURE: 130 MMHG | HEART RATE: 85 BPM | DIASTOLIC BLOOD PRESSURE: 82 MMHG

## 2021-05-25 DIAGNOSIS — M79.672 LEFT FOOT PAIN: ICD-10-CM

## 2021-05-25 DIAGNOSIS — E11.42 DIABETIC POLYNEUROPATHY ASSOCIATED WITH TYPE 2 DIABETES MELLITUS: ICD-10-CM

## 2021-05-25 DIAGNOSIS — M72.2 PLANTAR FASCIITIS: Primary | ICD-10-CM

## 2021-05-25 DIAGNOSIS — I63.411 EMBOLIC STROKE INVOLVING RIGHT MIDDLE CEREBRAL ARTERY: ICD-10-CM

## 2021-05-25 DIAGNOSIS — L60.9 DISEASE OF NAIL: ICD-10-CM

## 2021-05-25 DIAGNOSIS — M77.32 HEEL SPUR, LEFT: ICD-10-CM

## 2021-05-25 PROCEDURE — 3051F HG A1C>EQUAL 7.0%<8.0%: CPT | Mod: CPTII,S$GLB,, | Performed by: STUDENT IN AN ORGANIZED HEALTH CARE EDUCATION/TRAINING PROGRAM

## 2021-05-25 PROCEDURE — 99203 OFFICE O/P NEW LOW 30 MIN: CPT | Mod: 25,S$GLB,, | Performed by: STUDENT IN AN ORGANIZED HEALTH CARE EDUCATION/TRAINING PROGRAM

## 2021-05-25 PROCEDURE — 3008F PR BODY MASS INDEX (BMI) DOCUMENTED: ICD-10-PCS | Mod: CPTII,S$GLB,, | Performed by: STUDENT IN AN ORGANIZED HEALTH CARE EDUCATION/TRAINING PROGRAM

## 2021-05-25 PROCEDURE — 11721 ROUTINE FOOT CARE: ICD-10-PCS | Mod: S$GLB,,, | Performed by: STUDENT IN AN ORGANIZED HEALTH CARE EDUCATION/TRAINING PROGRAM

## 2021-05-25 PROCEDURE — 11721 DEBRIDE NAIL 6 OR MORE: CPT | Mod: S$GLB,,, | Performed by: STUDENT IN AN ORGANIZED HEALTH CARE EDUCATION/TRAINING PROGRAM

## 2021-05-25 PROCEDURE — 1126F PR PAIN SEVERITY QUANTIFIED, NO PAIN PRESENT: ICD-10-PCS | Mod: S$GLB,,, | Performed by: STUDENT IN AN ORGANIZED HEALTH CARE EDUCATION/TRAINING PROGRAM

## 2021-05-25 PROCEDURE — 99203 PR OFFICE/OUTPT VISIT, NEW, LEVL III, 30-44 MIN: ICD-10-PCS | Mod: 25,S$GLB,, | Performed by: STUDENT IN AN ORGANIZED HEALTH CARE EDUCATION/TRAINING PROGRAM

## 2021-05-25 PROCEDURE — 99999 PR PBB SHADOW E&M-EST. PATIENT-LVL IV: ICD-10-PCS | Mod: PBBFAC,,, | Performed by: STUDENT IN AN ORGANIZED HEALTH CARE EDUCATION/TRAINING PROGRAM

## 2021-05-25 PROCEDURE — 1126F AMNT PAIN NOTED NONE PRSNT: CPT | Mod: S$GLB,,, | Performed by: STUDENT IN AN ORGANIZED HEALTH CARE EDUCATION/TRAINING PROGRAM

## 2021-05-25 PROCEDURE — 3008F BODY MASS INDEX DOCD: CPT | Mod: CPTII,S$GLB,, | Performed by: STUDENT IN AN ORGANIZED HEALTH CARE EDUCATION/TRAINING PROGRAM

## 2021-05-25 PROCEDURE — 99999 PR PBB SHADOW E&M-EST. PATIENT-LVL IV: CPT | Mod: PBBFAC,,, | Performed by: STUDENT IN AN ORGANIZED HEALTH CARE EDUCATION/TRAINING PROGRAM

## 2021-05-25 PROCEDURE — 3051F PR MOST RECENT HEMOGLOBIN A1C LEVEL 7.0 - < 8.0%: ICD-10-PCS | Mod: CPTII,S$GLB,, | Performed by: STUDENT IN AN ORGANIZED HEALTH CARE EDUCATION/TRAINING PROGRAM

## 2021-05-28 ENCOUNTER — PATIENT MESSAGE (OUTPATIENT)
Dept: ADMINISTRATIVE | Facility: HOSPITAL | Age: 56
End: 2021-05-28

## 2021-06-01 ENCOUNTER — PATIENT MESSAGE (OUTPATIENT)
Dept: INTERNAL MEDICINE | Facility: CLINIC | Age: 56
End: 2021-06-01

## 2021-06-01 RX ORDER — GABAPENTIN 100 MG/1
300 CAPSULE ORAL NIGHTLY PRN
Qty: 90 CAPSULE | Refills: 0 | Status: SHIPPED | OUTPATIENT
Start: 2021-06-01 | End: 2021-06-05

## 2021-06-04 ENCOUNTER — PATIENT MESSAGE (OUTPATIENT)
Dept: ADMINISTRATIVE | Facility: HOSPITAL | Age: 56
End: 2021-06-04

## 2021-06-05 RX ORDER — GABAPENTIN 100 MG/1
300 CAPSULE ORAL 2 TIMES DAILY
Qty: 90 CAPSULE | Refills: 0
Start: 2021-06-05 | End: 2021-06-15 | Stop reason: SDUPTHER

## 2021-06-06 ENCOUNTER — PATIENT MESSAGE (OUTPATIENT)
Dept: ADMINISTRATIVE | Facility: HOSPITAL | Age: 56
End: 2021-06-06

## 2021-06-08 ENCOUNTER — HOME CARE VISIT (OUTPATIENT)
Dept: NEUROLOGY | Facility: HOSPITAL | Age: 56
End: 2021-06-08

## 2021-06-08 VITALS
HEART RATE: 80 BPM | DIASTOLIC BLOOD PRESSURE: 67 MMHG | OXYGEN SATURATION: 96 % | RESPIRATION RATE: 20 BRPM | BODY MASS INDEX: 28.7 KG/M2 | SYSTOLIC BLOOD PRESSURE: 131 MMHG | WEIGHT: 162 LBS

## 2021-06-11 ENCOUNTER — PATIENT MESSAGE (OUTPATIENT)
Dept: INTERNAL MEDICINE | Facility: CLINIC | Age: 56
End: 2021-06-11

## 2021-06-15 ENCOUNTER — PATIENT MESSAGE (OUTPATIENT)
Dept: INTERNAL MEDICINE | Facility: CLINIC | Age: 56
End: 2021-06-15

## 2021-06-15 RX ORDER — GABAPENTIN 100 MG/1
300 CAPSULE ORAL 2 TIMES DAILY
Qty: 540 CAPSULE | Refills: 1 | Status: SHIPPED | OUTPATIENT
Start: 2021-06-15 | End: 2021-06-16 | Stop reason: SDUPTHER

## 2021-06-16 ENCOUNTER — PATIENT MESSAGE (OUTPATIENT)
Dept: INTERNAL MEDICINE | Facility: CLINIC | Age: 56
End: 2021-06-16

## 2021-06-16 RX ORDER — GABAPENTIN 100 MG/1
300 CAPSULE ORAL 2 TIMES DAILY
Qty: 540 CAPSULE | Refills: 1 | Status: SHIPPED | OUTPATIENT
Start: 2021-06-16 | End: 2021-06-17 | Stop reason: SDUPTHER

## 2021-06-17 ENCOUNTER — PATIENT MESSAGE (OUTPATIENT)
Dept: INTERNAL MEDICINE | Facility: CLINIC | Age: 56
End: 2021-06-17

## 2021-06-17 RX ORDER — GABAPENTIN 100 MG/1
300 CAPSULE ORAL 2 TIMES DAILY
Qty: 540 CAPSULE | Refills: 1 | Status: SHIPPED | OUTPATIENT
Start: 2021-06-17 | End: 2021-11-04

## 2021-06-18 ENCOUNTER — PATIENT MESSAGE (OUTPATIENT)
Dept: INTERNAL MEDICINE | Facility: CLINIC | Age: 56
End: 2021-06-18

## 2021-06-25 ENCOUNTER — PATIENT MESSAGE (OUTPATIENT)
Dept: INTERNAL MEDICINE | Facility: CLINIC | Age: 56
End: 2021-06-25

## 2021-07-02 ENCOUNTER — LAB VISIT (OUTPATIENT)
Dept: LAB | Facility: HOSPITAL | Age: 56
End: 2021-07-02
Attending: NURSE PRACTITIONER
Payer: COMMERCIAL

## 2021-07-02 ENCOUNTER — PATIENT MESSAGE (OUTPATIENT)
Dept: INTERNAL MEDICINE | Facility: CLINIC | Age: 56
End: 2021-07-02

## 2021-07-02 DIAGNOSIS — I15.2 HYPERTENSION ASSOCIATED WITH DIABETES: ICD-10-CM

## 2021-07-02 DIAGNOSIS — E11.69 HYPERLIPIDEMIA ASSOCIATED WITH TYPE 2 DIABETES MELLITUS: ICD-10-CM

## 2021-07-02 DIAGNOSIS — Z79.4 TYPE 2 DIABETES MELLITUS WITH HYPERGLYCEMIA, WITH LONG-TERM CURRENT USE OF INSULIN: ICD-10-CM

## 2021-07-02 DIAGNOSIS — E05.90 SUBCLINICAL HYPERTHYROIDISM: ICD-10-CM

## 2021-07-02 DIAGNOSIS — E11.59 HYPERTENSION ASSOCIATED WITH DIABETES: ICD-10-CM

## 2021-07-02 DIAGNOSIS — E78.5 HYPERLIPIDEMIA ASSOCIATED WITH TYPE 2 DIABETES MELLITUS: ICD-10-CM

## 2021-07-02 DIAGNOSIS — E11.65 TYPE 2 DIABETES MELLITUS WITH HYPERGLYCEMIA, WITH LONG-TERM CURRENT USE OF INSULIN: ICD-10-CM

## 2021-07-02 LAB
ALBUMIN SERPL BCP-MCNC: 3.6 G/DL (ref 3.5–5.2)
ALP SERPL-CCNC: 63 U/L (ref 55–135)
ALT SERPL W/O P-5'-P-CCNC: 18 U/L (ref 10–44)
ANION GAP SERPL CALC-SCNC: 10 MMOL/L (ref 8–16)
AST SERPL-CCNC: 16 U/L (ref 10–40)
BILIRUB SERPL-MCNC: 0.4 MG/DL (ref 0.1–1)
BUN SERPL-MCNC: 17 MG/DL (ref 6–20)
CALCIUM SERPL-MCNC: 9.5 MG/DL (ref 8.7–10.5)
CHLORIDE SERPL-SCNC: 108 MMOL/L (ref 95–110)
CHOLEST SERPL-MCNC: 160 MG/DL (ref 120–199)
CHOLEST/HDLC SERPL: 3.3 {RATIO} (ref 2–5)
CO2 SERPL-SCNC: 24 MMOL/L (ref 23–29)
CREAT SERPL-MCNC: 0.7 MG/DL (ref 0.5–1.4)
EST. GFR  (AFRICAN AMERICAN): >60 ML/MIN/1.73 M^2
EST. GFR  (NON AFRICAN AMERICAN): >60 ML/MIN/1.73 M^2
ESTIMATED AVG GLUCOSE: 157 MG/DL (ref 68–131)
GLUCOSE SERPL-MCNC: 143 MG/DL (ref 70–110)
HBA1C MFR BLD: 7.1 % (ref 4–5.6)
HDLC SERPL-MCNC: 48 MG/DL (ref 40–75)
HDLC SERPL: 30 % (ref 20–50)
LDLC SERPL CALC-MCNC: 91.8 MG/DL (ref 63–159)
NONHDLC SERPL-MCNC: 112 MG/DL
POTASSIUM SERPL-SCNC: 4.3 MMOL/L (ref 3.5–5.1)
PROT SERPL-MCNC: 6.7 G/DL (ref 6–8.4)
SODIUM SERPL-SCNC: 142 MMOL/L (ref 136–145)
T4 FREE SERPL-MCNC: 0.97 NG/DL (ref 0.71–1.51)
TRIGL SERPL-MCNC: 101 MG/DL (ref 30–150)
TSH SERPL DL<=0.005 MIU/L-ACNC: 3.01 UIU/ML (ref 0.4–4)

## 2021-07-02 PROCEDURE — 80061 LIPID PANEL: CPT | Performed by: NURSE PRACTITIONER

## 2021-07-02 PROCEDURE — 83036 HEMOGLOBIN GLYCOSYLATED A1C: CPT | Performed by: NURSE PRACTITIONER

## 2021-07-02 PROCEDURE — 36415 COLL VENOUS BLD VENIPUNCTURE: CPT | Mod: PO | Performed by: NURSE PRACTITIONER

## 2021-07-02 PROCEDURE — 84443 ASSAY THYROID STIM HORMONE: CPT | Performed by: NURSE PRACTITIONER

## 2021-07-02 PROCEDURE — 84439 ASSAY OF FREE THYROXINE: CPT | Performed by: NURSE PRACTITIONER

## 2021-07-02 PROCEDURE — 80053 COMPREHEN METABOLIC PANEL: CPT | Performed by: NURSE PRACTITIONER

## 2021-07-08 ENCOUNTER — OFFICE VISIT (OUTPATIENT)
Dept: INTERNAL MEDICINE | Facility: CLINIC | Age: 56
End: 2021-07-08
Payer: COMMERCIAL

## 2021-07-08 VITALS
RESPIRATION RATE: 16 BRPM | BODY MASS INDEX: 28.39 KG/M2 | OXYGEN SATURATION: 99 % | SYSTOLIC BLOOD PRESSURE: 122 MMHG | DIASTOLIC BLOOD PRESSURE: 80 MMHG | HEART RATE: 90 BPM | WEIGHT: 160.25 LBS | TEMPERATURE: 97 F | HEIGHT: 63 IN

## 2021-07-08 DIAGNOSIS — I69.398 LEFT HOMONYMOUS HEMIANOPSIA DUE TO RECENT CEREBRAL INFARCTION: ICD-10-CM

## 2021-07-08 DIAGNOSIS — H53.462 LEFT HOMONYMOUS HEMIANOPSIA DUE TO RECENT CEREBRAL INFARCTION: ICD-10-CM

## 2021-07-08 DIAGNOSIS — E78.5 HYPERLIPIDEMIA ASSOCIATED WITH TYPE 2 DIABETES MELLITUS: ICD-10-CM

## 2021-07-08 DIAGNOSIS — Z79.4 TYPE 2 DIABETES MELLITUS WITH HYPERGLYCEMIA, WITH LONG-TERM CURRENT USE OF INSULIN: Primary | ICD-10-CM

## 2021-07-08 DIAGNOSIS — I15.2 HYPERTENSION ASSOCIATED WITH DIABETES: ICD-10-CM

## 2021-07-08 DIAGNOSIS — E05.90 SUBCLINICAL HYPERTHYROIDISM: ICD-10-CM

## 2021-07-08 DIAGNOSIS — F32.A DEPRESSION, UNSPECIFIED DEPRESSION TYPE: ICD-10-CM

## 2021-07-08 DIAGNOSIS — E66.01 SEVERE OBESITY: ICD-10-CM

## 2021-07-08 DIAGNOSIS — E11.65 TYPE 2 DIABETES MELLITUS WITH HYPERGLYCEMIA, WITH LONG-TERM CURRENT USE OF INSULIN: Primary | ICD-10-CM

## 2021-07-08 DIAGNOSIS — E11.69 HYPERLIPIDEMIA ASSOCIATED WITH TYPE 2 DIABETES MELLITUS: ICD-10-CM

## 2021-07-08 DIAGNOSIS — E11.59 HYPERTENSION ASSOCIATED WITH DIABETES: ICD-10-CM

## 2021-07-08 PROCEDURE — 3051F PR MOST RECENT HEMOGLOBIN A1C LEVEL 7.0 - < 8.0%: ICD-10-PCS | Mod: CPTII,S$GLB,, | Performed by: NURSE PRACTITIONER

## 2021-07-08 PROCEDURE — 99214 PR OFFICE/OUTPT VISIT, EST, LEVL IV, 30-39 MIN: ICD-10-PCS | Mod: S$GLB,,, | Performed by: NURSE PRACTITIONER

## 2021-07-08 PROCEDURE — 3008F PR BODY MASS INDEX (BMI) DOCUMENTED: ICD-10-PCS | Mod: CPTII,S$GLB,, | Performed by: NURSE PRACTITIONER

## 2021-07-08 PROCEDURE — 3051F HG A1C>EQUAL 7.0%<8.0%: CPT | Mod: CPTII,S$GLB,, | Performed by: NURSE PRACTITIONER

## 2021-07-08 PROCEDURE — 99999 PR PBB SHADOW E&M-EST. PATIENT-LVL IV: ICD-10-PCS | Mod: PBBFAC,,, | Performed by: NURSE PRACTITIONER

## 2021-07-08 PROCEDURE — 3008F BODY MASS INDEX DOCD: CPT | Mod: CPTII,S$GLB,, | Performed by: NURSE PRACTITIONER

## 2021-07-08 PROCEDURE — 1125F PR PAIN SEVERITY QUANTIFIED, PAIN PRESENT: ICD-10-PCS | Mod: S$GLB,,, | Performed by: NURSE PRACTITIONER

## 2021-07-08 PROCEDURE — 99999 PR PBB SHADOW E&M-EST. PATIENT-LVL IV: CPT | Mod: PBBFAC,,, | Performed by: NURSE PRACTITIONER

## 2021-07-08 PROCEDURE — 1125F AMNT PAIN NOTED PAIN PRSNT: CPT | Mod: S$GLB,,, | Performed by: NURSE PRACTITIONER

## 2021-07-08 PROCEDURE — 99214 OFFICE O/P EST MOD 30 MIN: CPT | Mod: S$GLB,,, | Performed by: NURSE PRACTITIONER

## 2021-07-08 PROCEDURE — 95251 CONT GLUC MNTR ANALYSIS I&R: CPT | Mod: S$GLB,,, | Performed by: NURSE PRACTITIONER

## 2021-07-08 PROCEDURE — 95251 PR GLUCOSE MONITOR, 72 HOUR, PHYS INTERP: ICD-10-PCS | Mod: S$GLB,,, | Performed by: NURSE PRACTITIONER

## 2021-07-08 RX ORDER — DULAGLUTIDE 3 MG/.5ML
3 INJECTION, SOLUTION SUBCUTANEOUS
Qty: 4 PEN | Refills: 4 | Status: SHIPPED | OUTPATIENT
Start: 2021-07-08 | End: 2022-02-21

## 2021-07-09 ENCOUNTER — PATIENT MESSAGE (OUTPATIENT)
Dept: INTERNAL MEDICINE | Facility: CLINIC | Age: 56
End: 2021-07-09

## 2021-07-16 ENCOUNTER — PATIENT MESSAGE (OUTPATIENT)
Dept: INTERNAL MEDICINE | Facility: CLINIC | Age: 56
End: 2021-07-16

## 2021-07-30 ENCOUNTER — PATIENT MESSAGE (OUTPATIENT)
Dept: INTERNAL MEDICINE | Facility: CLINIC | Age: 56
End: 2021-07-30

## 2021-08-03 ENCOUNTER — HOME CARE VISIT (OUTPATIENT)
Dept: NEUROLOGY | Facility: HOSPITAL | Age: 56
End: 2021-08-03
Payer: COMMERCIAL

## 2021-08-03 VITALS
HEART RATE: 80 BPM | RESPIRATION RATE: 20 BRPM | OXYGEN SATURATION: 98 % | DIASTOLIC BLOOD PRESSURE: 90 MMHG | SYSTOLIC BLOOD PRESSURE: 148 MMHG

## 2021-08-04 ENCOUNTER — OFFICE VISIT (OUTPATIENT)
Dept: INTERNAL MEDICINE | Facility: CLINIC | Age: 56
End: 2021-08-04
Payer: COMMERCIAL

## 2021-08-04 VITALS
RESPIRATION RATE: 18 BRPM | WEIGHT: 158.06 LBS | DIASTOLIC BLOOD PRESSURE: 80 MMHG | OXYGEN SATURATION: 97 % | BODY MASS INDEX: 28 KG/M2 | HEART RATE: 80 BPM | TEMPERATURE: 98 F | SYSTOLIC BLOOD PRESSURE: 118 MMHG | HEIGHT: 63 IN

## 2021-08-04 DIAGNOSIS — M51.36 DDD (DEGENERATIVE DISC DISEASE), LUMBAR: Primary | ICD-10-CM

## 2021-08-04 DIAGNOSIS — M54.9 DORSALGIA, UNSPECIFIED: ICD-10-CM

## 2021-08-04 DIAGNOSIS — E11.65 TYPE 2 DIABETES MELLITUS WITH HYPERGLYCEMIA, WITH LONG-TERM CURRENT USE OF INSULIN: ICD-10-CM

## 2021-08-04 DIAGNOSIS — Z79.4 TYPE 2 DIABETES MELLITUS WITH HYPERGLYCEMIA, WITH LONG-TERM CURRENT USE OF INSULIN: ICD-10-CM

## 2021-08-04 DIAGNOSIS — R29.898 WEAKNESS OF RIGHT LOWER EXTREMITY: ICD-10-CM

## 2021-08-04 DIAGNOSIS — M54.16 LUMBAR RADICULOPATHY: ICD-10-CM

## 2021-08-04 PROCEDURE — 3074F SYST BP LT 130 MM HG: CPT | Mod: CPTII,S$GLB,, | Performed by: INTERNAL MEDICINE

## 2021-08-04 PROCEDURE — 3008F BODY MASS INDEX DOCD: CPT | Mod: CPTII,S$GLB,, | Performed by: INTERNAL MEDICINE

## 2021-08-04 PROCEDURE — 1125F AMNT PAIN NOTED PAIN PRSNT: CPT | Mod: CPTII,S$GLB,, | Performed by: INTERNAL MEDICINE

## 2021-08-04 PROCEDURE — 1160F RVW MEDS BY RX/DR IN RCRD: CPT | Mod: CPTII,S$GLB,, | Performed by: INTERNAL MEDICINE

## 2021-08-04 PROCEDURE — 99999 PR PBB SHADOW E&M-EST. PATIENT-LVL V: ICD-10-PCS | Mod: PBBFAC,,, | Performed by: INTERNAL MEDICINE

## 2021-08-04 PROCEDURE — 1159F MED LIST DOCD IN RCRD: CPT | Mod: CPTII,S$GLB,, | Performed by: INTERNAL MEDICINE

## 2021-08-04 PROCEDURE — 1125F PR PAIN SEVERITY QUANTIFIED, PAIN PRESENT: ICD-10-PCS | Mod: CPTII,S$GLB,, | Performed by: INTERNAL MEDICINE

## 2021-08-04 PROCEDURE — 3079F PR MOST RECENT DIASTOLIC BLOOD PRESSURE 80-89 MM HG: ICD-10-PCS | Mod: CPTII,S$GLB,, | Performed by: INTERNAL MEDICINE

## 2021-08-04 PROCEDURE — 3051F PR MOST RECENT HEMOGLOBIN A1C LEVEL 7.0 - < 8.0%: ICD-10-PCS | Mod: CPTII,S$GLB,, | Performed by: INTERNAL MEDICINE

## 2021-08-04 PROCEDURE — 3079F DIAST BP 80-89 MM HG: CPT | Mod: CPTII,S$GLB,, | Performed by: INTERNAL MEDICINE

## 2021-08-04 PROCEDURE — 99999 PR PBB SHADOW E&M-EST. PATIENT-LVL V: CPT | Mod: PBBFAC,,, | Performed by: INTERNAL MEDICINE

## 2021-08-04 PROCEDURE — 3008F PR BODY MASS INDEX (BMI) DOCUMENTED: ICD-10-PCS | Mod: CPTII,S$GLB,, | Performed by: INTERNAL MEDICINE

## 2021-08-04 PROCEDURE — 99213 OFFICE O/P EST LOW 20 MIN: CPT | Mod: S$GLB,,, | Performed by: INTERNAL MEDICINE

## 2021-08-04 PROCEDURE — 99213 PR OFFICE/OUTPT VISIT, EST, LEVL III, 20-29 MIN: ICD-10-PCS | Mod: S$GLB,,, | Performed by: INTERNAL MEDICINE

## 2021-08-04 PROCEDURE — 3051F HG A1C>EQUAL 7.0%<8.0%: CPT | Mod: CPTII,S$GLB,, | Performed by: INTERNAL MEDICINE

## 2021-08-04 PROCEDURE — 3074F PR MOST RECENT SYSTOLIC BLOOD PRESSURE < 130 MM HG: ICD-10-PCS | Mod: CPTII,S$GLB,, | Performed by: INTERNAL MEDICINE

## 2021-08-04 PROCEDURE — 1159F PR MEDICATION LIST DOCUMENTED IN MEDICAL RECORD: ICD-10-PCS | Mod: CPTII,S$GLB,, | Performed by: INTERNAL MEDICINE

## 2021-08-04 PROCEDURE — 1160F PR REVIEW ALL MEDS BY PRESCRIBER/CLIN PHARMACIST DOCUMENTED: ICD-10-PCS | Mod: CPTII,S$GLB,, | Performed by: INTERNAL MEDICINE

## 2021-08-04 RX ORDER — NAPROXEN 500 MG/1
500 TABLET ORAL 2 TIMES DAILY
Qty: 40 TABLET | Refills: 1 | Status: SHIPPED | OUTPATIENT
Start: 2021-08-04 | End: 2021-11-04

## 2021-08-11 ENCOUNTER — TELEPHONE (OUTPATIENT)
Dept: INTERNAL MEDICINE | Facility: CLINIC | Age: 56
End: 2021-08-11

## 2021-08-11 ENCOUNTER — PATIENT MESSAGE (OUTPATIENT)
Dept: INTERNAL MEDICINE | Facility: CLINIC | Age: 56
End: 2021-08-11

## 2021-08-11 ENCOUNTER — HOSPITAL ENCOUNTER (OUTPATIENT)
Dept: RADIOLOGY | Facility: HOSPITAL | Age: 56
Discharge: HOME | End: 2021-08-11
Attending: INTERNAL MEDICINE
Payer: COMMERCIAL

## 2021-08-11 DIAGNOSIS — M51.06 DISC DISEASE WITH MYELOPATHY, LUMBAR: Primary | ICD-10-CM

## 2021-08-11 DIAGNOSIS — M54.16 LUMBAR RADICULOPATHY: ICD-10-CM

## 2021-08-11 DIAGNOSIS — M51.36 DDD (DEGENERATIVE DISC DISEASE), LUMBAR: ICD-10-CM

## 2021-08-11 PROCEDURE — 72148 MRI LUMBAR SPINE WITHOUT CONTRAST: ICD-10-PCS | Mod: 26,,, | Performed by: INTERNAL MEDICINE

## 2021-08-11 PROCEDURE — 72148 MRI LUMBAR SPINE W/O DYE: CPT | Mod: TC

## 2021-08-11 PROCEDURE — 72148 MRI LUMBAR SPINE W/O DYE: CPT | Mod: 26,,, | Performed by: INTERNAL MEDICINE

## 2021-08-16 ENCOUNTER — PATIENT MESSAGE (OUTPATIENT)
Dept: INTERNAL MEDICINE | Facility: CLINIC | Age: 56
End: 2021-08-16

## 2021-08-24 ENCOUNTER — TELEPHONE (OUTPATIENT)
Dept: NEUROSURGERY | Facility: CLINIC | Age: 56
End: 2021-08-24

## 2021-08-24 DIAGNOSIS — M54.9 BACK PAIN, UNSPECIFIED BACK LOCATION, UNSPECIFIED BACK PAIN LATERALITY, UNSPECIFIED CHRONICITY: Primary | ICD-10-CM

## 2021-09-16 ENCOUNTER — HOSPITAL ENCOUNTER (OUTPATIENT)
Dept: RADIOLOGY | Facility: HOSPITAL | Age: 56
Discharge: HOME | End: 2021-09-16
Attending: PHYSICIAN ASSISTANT
Payer: COMMERCIAL

## 2021-09-16 ENCOUNTER — OFFICE VISIT (OUTPATIENT)
Dept: NEUROSURGERY | Facility: CLINIC | Age: 56
End: 2021-09-16
Payer: COMMERCIAL

## 2021-09-16 VITALS — DIASTOLIC BLOOD PRESSURE: 79 MMHG | HEART RATE: 84 BPM | SYSTOLIC BLOOD PRESSURE: 130 MMHG

## 2021-09-16 DIAGNOSIS — M54.9 BACK PAIN, UNSPECIFIED BACK LOCATION, UNSPECIFIED BACK PAIN LATERALITY, UNSPECIFIED CHRONICITY: ICD-10-CM

## 2021-09-16 DIAGNOSIS — M47.26 OTHER SPONDYLOSIS WITH RADICULOPATHY, LUMBAR REGION: ICD-10-CM

## 2021-09-16 DIAGNOSIS — M54.16 LUMBAR RADICULOPATHY: ICD-10-CM

## 2021-09-16 DIAGNOSIS — G89.29 CHRONIC BILATERAL LOW BACK PAIN WITH RIGHT-SIDED SCIATICA: Primary | ICD-10-CM

## 2021-09-16 DIAGNOSIS — M54.41 CHRONIC BILATERAL LOW BACK PAIN WITH RIGHT-SIDED SCIATICA: Primary | ICD-10-CM

## 2021-09-16 PROCEDURE — 3066F NEPHROPATHY DOC TX: CPT | Mod: CPTII,S$GLB,, | Performed by: PHYSICIAN ASSISTANT

## 2021-09-16 PROCEDURE — 3051F HG A1C>EQUAL 7.0%<8.0%: CPT | Mod: CPTII,S$GLB,, | Performed by: PHYSICIAN ASSISTANT

## 2021-09-16 PROCEDURE — 72110 X-RAY EXAM L-2 SPINE 4/>VWS: CPT | Mod: TC,FY

## 2021-09-16 PROCEDURE — 4010F ACE/ARB THERAPY RXD/TAKEN: CPT | Mod: CPTII,S$GLB,, | Performed by: PHYSICIAN ASSISTANT

## 2021-09-16 PROCEDURE — 1159F MED LIST DOCD IN RCRD: CPT | Mod: CPTII,S$GLB,, | Performed by: PHYSICIAN ASSISTANT

## 2021-09-16 PROCEDURE — 99999 PR PBB SHADOW E&M-EST. PATIENT-LVL IV: ICD-10-PCS | Mod: PBBFAC,,, | Performed by: PHYSICIAN ASSISTANT

## 2021-09-16 PROCEDURE — 1160F PR REVIEW ALL MEDS BY PRESCRIBER/CLIN PHARMACIST DOCUMENTED: ICD-10-PCS | Mod: CPTII,S$GLB,, | Performed by: PHYSICIAN ASSISTANT

## 2021-09-16 PROCEDURE — 72110 XR LUMBAR SPINE AP AND LAT WITH FLEX/EXT: ICD-10-PCS | Mod: 26,,, | Performed by: RADIOLOGY

## 2021-09-16 PROCEDURE — 3075F PR MOST RECENT SYSTOLIC BLOOD PRESS GE 130-139MM HG: ICD-10-PCS | Mod: CPTII,S$GLB,, | Performed by: PHYSICIAN ASSISTANT

## 2021-09-16 PROCEDURE — 1160F RVW MEDS BY RX/DR IN RCRD: CPT | Mod: CPTII,S$GLB,, | Performed by: PHYSICIAN ASSISTANT

## 2021-09-16 PROCEDURE — 4010F PR ACE/ARB THEARPY RXD/TAKEN: ICD-10-PCS | Mod: CPTII,S$GLB,, | Performed by: PHYSICIAN ASSISTANT

## 2021-09-16 PROCEDURE — 3075F SYST BP GE 130 - 139MM HG: CPT | Mod: CPTII,S$GLB,, | Performed by: PHYSICIAN ASSISTANT

## 2021-09-16 PROCEDURE — 3078F DIAST BP <80 MM HG: CPT | Mod: CPTII,S$GLB,, | Performed by: PHYSICIAN ASSISTANT

## 2021-09-16 PROCEDURE — 99204 PR OFFICE/OUTPT VISIT, NEW, LEVL IV, 45-59 MIN: ICD-10-PCS | Mod: S$GLB,,, | Performed by: PHYSICIAN ASSISTANT

## 2021-09-16 PROCEDURE — 3051F PR MOST RECENT HEMOGLOBIN A1C LEVEL 7.0 - < 8.0%: ICD-10-PCS | Mod: CPTII,S$GLB,, | Performed by: PHYSICIAN ASSISTANT

## 2021-09-16 PROCEDURE — 99204 OFFICE O/P NEW MOD 45 MIN: CPT | Mod: S$GLB,,, | Performed by: PHYSICIAN ASSISTANT

## 2021-09-16 PROCEDURE — 3066F PR DOCUMENTATION OF TREATMENT FOR NEPHROPATHY: ICD-10-PCS | Mod: CPTII,S$GLB,, | Performed by: PHYSICIAN ASSISTANT

## 2021-09-16 PROCEDURE — 3078F PR MOST RECENT DIASTOLIC BLOOD PRESSURE < 80 MM HG: ICD-10-PCS | Mod: CPTII,S$GLB,, | Performed by: PHYSICIAN ASSISTANT

## 2021-09-16 PROCEDURE — 99999 PR PBB SHADOW E&M-EST. PATIENT-LVL IV: CPT | Mod: PBBFAC,,, | Performed by: PHYSICIAN ASSISTANT

## 2021-09-16 PROCEDURE — 1159F PR MEDICATION LIST DOCUMENTED IN MEDICAL RECORD: ICD-10-PCS | Mod: CPTII,S$GLB,, | Performed by: PHYSICIAN ASSISTANT

## 2021-09-16 PROCEDURE — 72110 X-RAY EXAM L-2 SPINE 4/>VWS: CPT | Mod: 26,,, | Performed by: RADIOLOGY

## 2021-09-16 PROCEDURE — 3061F NEG MICROALBUMINURIA REV: CPT | Mod: CPTII,S$GLB,, | Performed by: PHYSICIAN ASSISTANT

## 2021-09-16 PROCEDURE — 3061F PR NEG MICROALBUMINURIA RESULT DOCUMENTED/REVIEW: ICD-10-PCS | Mod: CPTII,S$GLB,, | Performed by: PHYSICIAN ASSISTANT

## 2021-09-16 RX ORDER — DICLOFENAC SODIUM 75 MG/1
75 TABLET, DELAYED RELEASE ORAL 2 TIMES DAILY PRN
Qty: 60 TABLET | Refills: 2 | Status: SHIPPED | OUTPATIENT
Start: 2021-09-16 | End: 2023-06-27

## 2021-09-21 ENCOUNTER — PATIENT MESSAGE (OUTPATIENT)
Dept: INTERNAL MEDICINE | Facility: CLINIC | Age: 56
End: 2021-09-21

## 2021-09-24 ENCOUNTER — OFFICE VISIT (OUTPATIENT)
Dept: INTERNAL MEDICINE | Facility: CLINIC | Age: 56
End: 2021-09-24
Payer: COMMERCIAL

## 2021-09-24 VITALS
HEART RATE: 72 BPM | TEMPERATURE: 98 F | DIASTOLIC BLOOD PRESSURE: 96 MMHG | HEIGHT: 63 IN | RESPIRATION RATE: 18 BRPM | SYSTOLIC BLOOD PRESSURE: 130 MMHG | WEIGHT: 164.44 LBS | BODY MASS INDEX: 29.14 KG/M2

## 2021-09-24 DIAGNOSIS — E78.5 HYPERLIPIDEMIA ASSOCIATED WITH TYPE 2 DIABETES MELLITUS: ICD-10-CM

## 2021-09-24 DIAGNOSIS — I15.2 HYPERTENSION ASSOCIATED WITH DIABETES: ICD-10-CM

## 2021-09-24 DIAGNOSIS — Z79.4 TYPE 2 DIABETES MELLITUS WITH HYPERGLYCEMIA, WITH LONG-TERM CURRENT USE OF INSULIN: Primary | ICD-10-CM

## 2021-09-24 DIAGNOSIS — E11.69 HYPERLIPIDEMIA ASSOCIATED WITH TYPE 2 DIABETES MELLITUS: ICD-10-CM

## 2021-09-24 DIAGNOSIS — I63.411 EMBOLIC STROKE INVOLVING RIGHT MIDDLE CEREBRAL ARTERY: ICD-10-CM

## 2021-09-24 DIAGNOSIS — E11.59 HYPERTENSION ASSOCIATED WITH DIABETES: ICD-10-CM

## 2021-09-24 DIAGNOSIS — E11.65 TYPE 2 DIABETES MELLITUS WITH HYPERGLYCEMIA, WITH LONG-TERM CURRENT USE OF INSULIN: Primary | ICD-10-CM

## 2021-09-24 DIAGNOSIS — M47.26 OTHER SPONDYLOSIS WITH RADICULOPATHY, LUMBAR REGION: ICD-10-CM

## 2021-09-24 PROCEDURE — 99999 PR PBB SHADOW E&M-EST. PATIENT-LVL V: ICD-10-PCS | Mod: PBBFAC,,, | Performed by: HOSPITALIST

## 2021-09-24 PROCEDURE — 3080F DIAST BP >= 90 MM HG: CPT | Mod: CPTII,S$GLB,, | Performed by: HOSPITALIST

## 2021-09-24 PROCEDURE — 1160F RVW MEDS BY RX/DR IN RCRD: CPT | Mod: CPTII,S$GLB,, | Performed by: HOSPITALIST

## 2021-09-24 PROCEDURE — 3066F NEPHROPATHY DOC TX: CPT | Mod: CPTII,S$GLB,, | Performed by: HOSPITALIST

## 2021-09-24 PROCEDURE — 3061F NEG MICROALBUMINURIA REV: CPT | Mod: CPTII,S$GLB,, | Performed by: HOSPITALIST

## 2021-09-24 PROCEDURE — 3066F PR DOCUMENTATION OF TREATMENT FOR NEPHROPATHY: ICD-10-PCS | Mod: CPTII,S$GLB,, | Performed by: HOSPITALIST

## 2021-09-24 PROCEDURE — 3080F PR MOST RECENT DIASTOLIC BLOOD PRESSURE >= 90 MM HG: ICD-10-PCS | Mod: CPTII,S$GLB,, | Performed by: HOSPITALIST

## 2021-09-24 PROCEDURE — 1160F PR REVIEW ALL MEDS BY PRESCRIBER/CLIN PHARMACIST DOCUMENTED: ICD-10-PCS | Mod: CPTII,S$GLB,, | Performed by: HOSPITALIST

## 2021-09-24 PROCEDURE — 1159F PR MEDICATION LIST DOCUMENTED IN MEDICAL RECORD: ICD-10-PCS | Mod: CPTII,S$GLB,, | Performed by: HOSPITALIST

## 2021-09-24 PROCEDURE — 1159F MED LIST DOCD IN RCRD: CPT | Mod: CPTII,S$GLB,, | Performed by: HOSPITALIST

## 2021-09-24 PROCEDURE — 3075F PR MOST RECENT SYSTOLIC BLOOD PRESS GE 130-139MM HG: ICD-10-PCS | Mod: CPTII,S$GLB,, | Performed by: HOSPITALIST

## 2021-09-24 PROCEDURE — 99213 PR OFFICE/OUTPT VISIT, EST, LEVL III, 20-29 MIN: ICD-10-PCS | Mod: S$GLB,,, | Performed by: HOSPITALIST

## 2021-09-24 PROCEDURE — 3008F PR BODY MASS INDEX (BMI) DOCUMENTED: ICD-10-PCS | Mod: CPTII,S$GLB,, | Performed by: HOSPITALIST

## 2021-09-24 PROCEDURE — 4010F PR ACE/ARB THEARPY RXD/TAKEN: ICD-10-PCS | Mod: CPTII,S$GLB,, | Performed by: HOSPITALIST

## 2021-09-24 PROCEDURE — 3008F BODY MASS INDEX DOCD: CPT | Mod: CPTII,S$GLB,, | Performed by: HOSPITALIST

## 2021-09-24 PROCEDURE — 3051F PR MOST RECENT HEMOGLOBIN A1C LEVEL 7.0 - < 8.0%: ICD-10-PCS | Mod: CPTII,S$GLB,, | Performed by: HOSPITALIST

## 2021-09-24 PROCEDURE — 3051F HG A1C>EQUAL 7.0%<8.0%: CPT | Mod: CPTII,S$GLB,, | Performed by: HOSPITALIST

## 2021-09-24 PROCEDURE — 99213 OFFICE O/P EST LOW 20 MIN: CPT | Mod: S$GLB,,, | Performed by: HOSPITALIST

## 2021-09-24 PROCEDURE — 99999 PR PBB SHADOW E&M-EST. PATIENT-LVL V: CPT | Mod: PBBFAC,,, | Performed by: HOSPITALIST

## 2021-09-24 PROCEDURE — 3075F SYST BP GE 130 - 139MM HG: CPT | Mod: CPTII,S$GLB,, | Performed by: HOSPITALIST

## 2021-09-24 PROCEDURE — 3061F PR NEG MICROALBUMINURIA RESULT DOCUMENTED/REVIEW: ICD-10-PCS | Mod: CPTII,S$GLB,, | Performed by: HOSPITALIST

## 2021-09-24 PROCEDURE — 4010F ACE/ARB THERAPY RXD/TAKEN: CPT | Mod: CPTII,S$GLB,, | Performed by: HOSPITALIST

## 2021-09-25 ENCOUNTER — PATIENT OUTREACH (OUTPATIENT)
Dept: ADMINISTRATIVE | Facility: OTHER | Age: 56
End: 2021-09-25

## 2021-09-27 ENCOUNTER — OFFICE VISIT (OUTPATIENT)
Dept: OPHTHALMOLOGY | Facility: CLINIC | Age: 56
End: 2021-09-27
Payer: COMMERCIAL

## 2021-09-27 ENCOUNTER — CLINICAL SUPPORT (OUTPATIENT)
Dept: OPHTHALMOLOGY | Facility: CLINIC | Age: 56
End: 2021-09-27
Payer: COMMERCIAL

## 2021-09-27 DIAGNOSIS — H53.462 HOMONYMOUS HEMIANOPIA, LEFT: Primary | ICD-10-CM

## 2021-09-27 DIAGNOSIS — H53.462 LEFT HOMONYMOUS HEMIANOPSIA DUE TO RECENT CEREBRAL INFARCTION: ICD-10-CM

## 2021-09-27 DIAGNOSIS — I69.398 LEFT HOMONYMOUS HEMIANOPSIA DUE TO RECENT CEREBRAL INFARCTION: ICD-10-CM

## 2021-09-27 DIAGNOSIS — H53.462 LEFT HOMONYMOUS HEMIANOPSIA DUE TO RECENT CEREBRAL INFARCTION: Primary | ICD-10-CM

## 2021-09-27 DIAGNOSIS — I69.398 LEFT HOMONYMOUS HEMIANOPSIA DUE TO RECENT CEREBRAL INFARCTION: Primary | ICD-10-CM

## 2021-09-27 PROCEDURE — 99212 OFFICE O/P EST SF 10 MIN: CPT | Mod: S$GLB,,, | Performed by: OPHTHALMOLOGY

## 2021-09-27 PROCEDURE — 99999 PR PBB SHADOW E&M-EST. PATIENT-LVL III: CPT | Mod: PBBFAC,,, | Performed by: OPHTHALMOLOGY

## 2021-09-27 PROCEDURE — 99212 PR OFFICE/OUTPT VISIT, EST, LEVL II, 10-19 MIN: ICD-10-PCS | Mod: S$GLB,,, | Performed by: OPHTHALMOLOGY

## 2021-09-27 PROCEDURE — 1159F MED LIST DOCD IN RCRD: CPT | Mod: CPTII,S$GLB,, | Performed by: OPHTHALMOLOGY

## 2021-09-27 PROCEDURE — 99999 PR PBB SHADOW E&M-EST. PATIENT-LVL III: ICD-10-PCS | Mod: PBBFAC,,, | Performed by: OPHTHALMOLOGY

## 2021-09-27 PROCEDURE — 4010F ACE/ARB THERAPY RXD/TAKEN: CPT | Mod: CPTII,S$GLB,, | Performed by: OPHTHALMOLOGY

## 2021-09-27 PROCEDURE — 3051F HG A1C>EQUAL 7.0%<8.0%: CPT | Mod: CPTII,S$GLB,, | Performed by: OPHTHALMOLOGY

## 2021-09-27 PROCEDURE — 3061F PR NEG MICROALBUMINURIA RESULT DOCUMENTED/REVIEW: ICD-10-PCS | Mod: CPTII,S$GLB,, | Performed by: OPHTHALMOLOGY

## 2021-09-27 PROCEDURE — 3061F NEG MICROALBUMINURIA REV: CPT | Mod: CPTII,S$GLB,, | Performed by: OPHTHALMOLOGY

## 2021-09-27 PROCEDURE — 1160F PR REVIEW ALL MEDS BY PRESCRIBER/CLIN PHARMACIST DOCUMENTED: ICD-10-PCS | Mod: CPTII,S$GLB,, | Performed by: OPHTHALMOLOGY

## 2021-09-27 PROCEDURE — 3066F PR DOCUMENTATION OF TREATMENT FOR NEPHROPATHY: ICD-10-PCS | Mod: CPTII,S$GLB,, | Performed by: OPHTHALMOLOGY

## 2021-09-27 PROCEDURE — 1159F PR MEDICATION LIST DOCUMENTED IN MEDICAL RECORD: ICD-10-PCS | Mod: CPTII,S$GLB,, | Performed by: OPHTHALMOLOGY

## 2021-09-27 PROCEDURE — 1160F RVW MEDS BY RX/DR IN RCRD: CPT | Mod: CPTII,S$GLB,, | Performed by: OPHTHALMOLOGY

## 2021-09-27 PROCEDURE — 3051F PR MOST RECENT HEMOGLOBIN A1C LEVEL 7.0 - < 8.0%: ICD-10-PCS | Mod: CPTII,S$GLB,, | Performed by: OPHTHALMOLOGY

## 2021-09-27 PROCEDURE — 3066F NEPHROPATHY DOC TX: CPT | Mod: CPTII,S$GLB,, | Performed by: OPHTHALMOLOGY

## 2021-09-27 PROCEDURE — 4010F PR ACE/ARB THEARPY RXD/TAKEN: ICD-10-PCS | Mod: CPTII,S$GLB,, | Performed by: OPHTHALMOLOGY

## 2021-09-29 ENCOUNTER — HOME CARE VISIT (OUTPATIENT)
Dept: NEUROLOGY | Facility: HOSPITAL | Age: 56
End: 2021-09-29

## 2021-09-29 VITALS
WEIGHT: 166 LBS | OXYGEN SATURATION: 99 % | BODY MASS INDEX: 29.41 KG/M2 | SYSTOLIC BLOOD PRESSURE: 142 MMHG | RESPIRATION RATE: 20 BRPM | HEART RATE: 66 BPM | DIASTOLIC BLOOD PRESSURE: 92 MMHG

## 2021-09-30 ENCOUNTER — LAB VISIT (OUTPATIENT)
Dept: LAB | Facility: HOSPITAL | Age: 56
End: 2021-09-30
Attending: NURSE PRACTITIONER
Payer: COMMERCIAL

## 2021-09-30 DIAGNOSIS — I15.2 HYPERTENSION ASSOCIATED WITH DIABETES: ICD-10-CM

## 2021-09-30 DIAGNOSIS — E11.59 HYPERTENSION ASSOCIATED WITH DIABETES: ICD-10-CM

## 2021-09-30 DIAGNOSIS — E11.65 TYPE 2 DIABETES MELLITUS WITH HYPERGLYCEMIA, WITH LONG-TERM CURRENT USE OF INSULIN: ICD-10-CM

## 2021-09-30 DIAGNOSIS — Z79.4 TYPE 2 DIABETES MELLITUS WITH HYPERGLYCEMIA, WITH LONG-TERM CURRENT USE OF INSULIN: ICD-10-CM

## 2021-09-30 DIAGNOSIS — E78.5 HYPERLIPIDEMIA ASSOCIATED WITH TYPE 2 DIABETES MELLITUS: ICD-10-CM

## 2021-09-30 DIAGNOSIS — E11.69 HYPERLIPIDEMIA ASSOCIATED WITH TYPE 2 DIABETES MELLITUS: ICD-10-CM

## 2021-09-30 LAB
ALBUMIN SERPL BCP-MCNC: 3.7 G/DL (ref 3.5–5.2)
ALBUMIN/CREAT UR: 3.9 UG/MG (ref 0–30)
ALP SERPL-CCNC: 77 U/L (ref 55–135)
ALT SERPL W/O P-5'-P-CCNC: 19 U/L (ref 10–44)
ANION GAP SERPL CALC-SCNC: 13 MMOL/L (ref 8–16)
AST SERPL-CCNC: 12 U/L (ref 10–40)
BILIRUB SERPL-MCNC: 0.5 MG/DL (ref 0.1–1)
BUN SERPL-MCNC: 18 MG/DL (ref 6–20)
CALCIUM SERPL-MCNC: 9.4 MG/DL (ref 8.7–10.5)
CHLORIDE SERPL-SCNC: 102 MMOL/L (ref 95–110)
CHOLEST SERPL-MCNC: 152 MG/DL (ref 120–199)
CHOLEST/HDLC SERPL: 3.2 {RATIO} (ref 2–5)
CO2 SERPL-SCNC: 20 MMOL/L (ref 23–29)
CREAT SERPL-MCNC: 0.7 MG/DL (ref 0.5–1.4)
CREAT UR-MCNC: 127 MG/DL (ref 15–325)
EST. GFR  (AFRICAN AMERICAN): >60 ML/MIN/1.73 M^2
EST. GFR  (NON AFRICAN AMERICAN): >60 ML/MIN/1.73 M^2
ESTIMATED AVG GLUCOSE: 151 MG/DL (ref 68–131)
GLUCOSE SERPL-MCNC: 144 MG/DL (ref 70–110)
HBA1C MFR BLD: 6.9 % (ref 4–5.6)
HDLC SERPL-MCNC: 47 MG/DL (ref 40–75)
HDLC SERPL: 30.9 % (ref 20–50)
LDLC SERPL CALC-MCNC: 90 MG/DL (ref 63–159)
MICROALBUMIN UR DL<=1MG/L-MCNC: 5 UG/ML
NONHDLC SERPL-MCNC: 105 MG/DL
POTASSIUM SERPL-SCNC: 4.3 MMOL/L (ref 3.5–5.1)
PROT SERPL-MCNC: 6.5 G/DL (ref 6–8.4)
SODIUM SERPL-SCNC: 135 MMOL/L (ref 136–145)
TRIGL SERPL-MCNC: 75 MG/DL (ref 30–150)

## 2021-09-30 PROCEDURE — 36415 COLL VENOUS BLD VENIPUNCTURE: CPT | Mod: PO | Performed by: NURSE PRACTITIONER

## 2021-09-30 PROCEDURE — 83036 HEMOGLOBIN GLYCOSYLATED A1C: CPT | Performed by: NURSE PRACTITIONER

## 2021-09-30 PROCEDURE — 80053 COMPREHEN METABOLIC PANEL: CPT | Performed by: NURSE PRACTITIONER

## 2021-09-30 PROCEDURE — 80061 LIPID PANEL: CPT | Performed by: NURSE PRACTITIONER

## 2021-09-30 PROCEDURE — 82570 ASSAY OF URINE CREATININE: CPT | Performed by: NURSE PRACTITIONER

## 2021-10-01 ENCOUNTER — PATIENT MESSAGE (OUTPATIENT)
Dept: NEUROSURGERY | Facility: CLINIC | Age: 56
End: 2021-10-01

## 2021-10-01 RX ORDER — GABAPENTIN 300 MG/1
300 CAPSULE ORAL 3 TIMES DAILY
Qty: 270 CAPSULE | Refills: 0 | Status: SHIPPED | OUTPATIENT
Start: 2021-10-01 | End: 2022-02-08 | Stop reason: SDUPTHER

## 2021-10-26 DIAGNOSIS — E11.65 TYPE 2 DIABETES MELLITUS WITH HYPERGLYCEMIA, WITH LONG-TERM CURRENT USE OF INSULIN: Primary | ICD-10-CM

## 2021-10-26 DIAGNOSIS — Z79.4 TYPE 2 DIABETES MELLITUS WITH HYPERGLYCEMIA, WITH LONG-TERM CURRENT USE OF INSULIN: Primary | ICD-10-CM

## 2021-11-03 ENCOUNTER — PATIENT OUTREACH (OUTPATIENT)
Dept: ADMINISTRATIVE | Facility: OTHER | Age: 56
End: 2021-11-03
Payer: COMMERCIAL

## 2021-11-04 ENCOUNTER — OFFICE VISIT (OUTPATIENT)
Dept: INTERNAL MEDICINE | Facility: CLINIC | Age: 56
End: 2021-11-04
Payer: COMMERCIAL

## 2021-11-04 ENCOUNTER — TELEPHONE (OUTPATIENT)
Dept: INTERNAL MEDICINE | Facility: CLINIC | Age: 56
End: 2021-11-04

## 2021-11-04 ENCOUNTER — CLINICAL SUPPORT (OUTPATIENT)
Dept: DIABETES | Facility: CLINIC | Age: 56
End: 2021-11-04
Payer: COMMERCIAL

## 2021-11-04 VITALS
DIASTOLIC BLOOD PRESSURE: 80 MMHG | OXYGEN SATURATION: 98 % | BODY MASS INDEX: 30.9 KG/M2 | HEIGHT: 63 IN | WEIGHT: 174.38 LBS | SYSTOLIC BLOOD PRESSURE: 132 MMHG | RESPIRATION RATE: 16 BRPM | TEMPERATURE: 98 F | HEART RATE: 82 BPM

## 2021-11-04 DIAGNOSIS — E11.69 HYPERLIPIDEMIA ASSOCIATED WITH TYPE 2 DIABETES MELLITUS: ICD-10-CM

## 2021-11-04 DIAGNOSIS — Z79.4 TYPE 2 DIABETES MELLITUS WITH HYPERGLYCEMIA, WITH LONG-TERM CURRENT USE OF INSULIN: ICD-10-CM

## 2021-11-04 DIAGNOSIS — E78.5 HYPERLIPIDEMIA ASSOCIATED WITH TYPE 2 DIABETES MELLITUS: ICD-10-CM

## 2021-11-04 DIAGNOSIS — E66.01 SEVERE OBESITY: ICD-10-CM

## 2021-11-04 DIAGNOSIS — E05.90 SUBCLINICAL HYPERTHYROIDISM: ICD-10-CM

## 2021-11-04 DIAGNOSIS — E11.65 TYPE 2 DIABETES MELLITUS WITH HYPERGLYCEMIA, WITH LONG-TERM CURRENT USE OF INSULIN: ICD-10-CM

## 2021-11-04 DIAGNOSIS — I63.411 EMBOLIC STROKE INVOLVING RIGHT MIDDLE CEREBRAL ARTERY: ICD-10-CM

## 2021-11-04 DIAGNOSIS — I15.2 HYPERTENSION ASSOCIATED WITH DIABETES: ICD-10-CM

## 2021-11-04 DIAGNOSIS — M48.061 SPINAL STENOSIS OF LUMBAR REGION, UNSPECIFIED WHETHER NEUROGENIC CLAUDICATION PRESENT: ICD-10-CM

## 2021-11-04 DIAGNOSIS — E11.59 HYPERTENSION ASSOCIATED WITH DIABETES: ICD-10-CM

## 2021-11-04 DIAGNOSIS — Z78.9 IMPAIRED INSTRUMENTAL ACTIVITIES OF DAILY LIVING (IADL): ICD-10-CM

## 2021-11-04 PROCEDURE — 3075F PR MOST RECENT SYSTOLIC BLOOD PRESS GE 130-139MM HG: ICD-10-PCS | Mod: CPTII,S$GLB,, | Performed by: NURSE PRACTITIONER

## 2021-11-04 PROCEDURE — 99215 OFFICE O/P EST HI 40 MIN: CPT | Mod: S$GLB,,, | Performed by: NURSE PRACTITIONER

## 2021-11-04 PROCEDURE — 3061F PR NEG MICROALBUMINURIA RESULT DOCUMENTED/REVIEW: ICD-10-PCS | Mod: CPTII,S$GLB,, | Performed by: NURSE PRACTITIONER

## 2021-11-04 PROCEDURE — 3079F DIAST BP 80-89 MM HG: CPT | Mod: CPTII,S$GLB,, | Performed by: NURSE PRACTITIONER

## 2021-11-04 PROCEDURE — 3075F SYST BP GE 130 - 139MM HG: CPT | Mod: CPTII,S$GLB,, | Performed by: NURSE PRACTITIONER

## 2021-11-04 PROCEDURE — 3008F BODY MASS INDEX DOCD: CPT | Mod: CPTII,S$GLB,, | Performed by: NURSE PRACTITIONER

## 2021-11-04 PROCEDURE — 3044F HG A1C LEVEL LT 7.0%: CPT | Mod: CPTII,S$GLB,, | Performed by: NURSE PRACTITIONER

## 2021-11-04 PROCEDURE — 3008F PR BODY MASS INDEX (BMI) DOCUMENTED: ICD-10-PCS | Mod: CPTII,S$GLB,, | Performed by: NURSE PRACTITIONER

## 2021-11-04 PROCEDURE — 3061F NEG MICROALBUMINURIA REV: CPT | Mod: CPTII,S$GLB,, | Performed by: NURSE PRACTITIONER

## 2021-11-04 PROCEDURE — G0108 DIAB MANAGE TRN  PER INDIV: HCPCS | Mod: S$GLB,,, | Performed by: DIETITIAN, REGISTERED

## 2021-11-04 PROCEDURE — 3066F NEPHROPATHY DOC TX: CPT | Mod: CPTII,S$GLB,, | Performed by: NURSE PRACTITIONER

## 2021-11-04 PROCEDURE — 3066F PR DOCUMENTATION OF TREATMENT FOR NEPHROPATHY: ICD-10-PCS | Mod: CPTII,S$GLB,, | Performed by: NURSE PRACTITIONER

## 2021-11-04 PROCEDURE — 1159F PR MEDICATION LIST DOCUMENTED IN MEDICAL RECORD: ICD-10-PCS | Mod: CPTII,S$GLB,, | Performed by: NURSE PRACTITIONER

## 2021-11-04 PROCEDURE — G0108 PR DIAB MANAGE TRN  PER INDIV: ICD-10-PCS | Mod: S$GLB,,, | Performed by: DIETITIAN, REGISTERED

## 2021-11-04 PROCEDURE — 99999 PR PBB SHADOW E&M-EST. PATIENT-LVL I: CPT | Mod: PBBFAC,,, | Performed by: DIETITIAN, REGISTERED

## 2021-11-04 PROCEDURE — 3044F PR MOST RECENT HEMOGLOBIN A1C LEVEL <7.0%: ICD-10-PCS | Mod: CPTII,S$GLB,, | Performed by: NURSE PRACTITIONER

## 2021-11-04 PROCEDURE — 1160F RVW MEDS BY RX/DR IN RCRD: CPT | Mod: CPTII,S$GLB,, | Performed by: NURSE PRACTITIONER

## 2021-11-04 PROCEDURE — 1160F PR REVIEW ALL MEDS BY PRESCRIBER/CLIN PHARMACIST DOCUMENTED: ICD-10-PCS | Mod: CPTII,S$GLB,, | Performed by: NURSE PRACTITIONER

## 2021-11-04 PROCEDURE — 99999 PR PBB SHADOW E&M-EST. PATIENT-LVL V: CPT | Mod: PBBFAC,,, | Performed by: NURSE PRACTITIONER

## 2021-11-04 PROCEDURE — 4010F PR ACE/ARB THEARPY RXD/TAKEN: ICD-10-PCS | Mod: CPTII,S$GLB,, | Performed by: NURSE PRACTITIONER

## 2021-11-04 PROCEDURE — 99215 PR OFFICE/OUTPT VISIT, EST, LEVL V, 40-54 MIN: ICD-10-PCS | Mod: S$GLB,,, | Performed by: NURSE PRACTITIONER

## 2021-11-04 PROCEDURE — 4010F ACE/ARB THERAPY RXD/TAKEN: CPT | Mod: CPTII,S$GLB,, | Performed by: NURSE PRACTITIONER

## 2021-11-04 PROCEDURE — 99999 PR PBB SHADOW E&M-EST. PATIENT-LVL V: ICD-10-PCS | Mod: PBBFAC,,, | Performed by: NURSE PRACTITIONER

## 2021-11-04 PROCEDURE — 99999 PR PBB SHADOW E&M-EST. PATIENT-LVL I: ICD-10-PCS | Mod: PBBFAC,,, | Performed by: DIETITIAN, REGISTERED

## 2021-11-04 PROCEDURE — 1159F MED LIST DOCD IN RCRD: CPT | Mod: CPTII,S$GLB,, | Performed by: NURSE PRACTITIONER

## 2021-11-04 PROCEDURE — 3079F PR MOST RECENT DIASTOLIC BLOOD PRESSURE 80-89 MM HG: ICD-10-PCS | Mod: CPTII,S$GLB,, | Performed by: NURSE PRACTITIONER

## 2021-11-04 RX ORDER — INSULIN ASPART 100 [IU]/ML
80 INJECTION, SOLUTION INTRAVENOUS; SUBCUTANEOUS CONTINUOUS
Qty: 30 ML | Refills: 6 | Status: SHIPPED | OUTPATIENT
Start: 2021-11-04 | End: 2022-02-08

## 2021-11-10 ENCOUNTER — HOME CARE VISIT (OUTPATIENT)
Dept: NEUROLOGY | Facility: HOSPITAL | Age: 56
End: 2021-11-10
Payer: COMMERCIAL

## 2021-11-11 ENCOUNTER — PATIENT MESSAGE (OUTPATIENT)
Dept: INTERNAL MEDICINE | Facility: CLINIC | Age: 56
End: 2021-11-11
Payer: COMMERCIAL

## 2021-11-15 ENCOUNTER — PATIENT MESSAGE (OUTPATIENT)
Dept: INTERNAL MEDICINE | Facility: CLINIC | Age: 56
End: 2021-11-15
Payer: COMMERCIAL

## 2021-11-16 ENCOUNTER — PATIENT MESSAGE (OUTPATIENT)
Dept: INTERNAL MEDICINE | Facility: CLINIC | Age: 56
End: 2021-11-16
Payer: COMMERCIAL

## 2021-11-18 ENCOUNTER — PATIENT MESSAGE (OUTPATIENT)
Dept: INTERNAL MEDICINE | Facility: CLINIC | Age: 56
End: 2021-11-18
Payer: COMMERCIAL

## 2021-11-18 ENCOUNTER — CLINICAL SUPPORT (OUTPATIENT)
Dept: DIABETES | Facility: CLINIC | Age: 56
End: 2021-11-18
Payer: COMMERCIAL

## 2021-11-18 DIAGNOSIS — E11.65 TYPE 2 DIABETES MELLITUS WITH HYPERGLYCEMIA, WITH LONG-TERM CURRENT USE OF INSULIN: ICD-10-CM

## 2021-11-18 DIAGNOSIS — Z79.4 TYPE 2 DIABETES MELLITUS WITH HYPERGLYCEMIA, WITH LONG-TERM CURRENT USE OF INSULIN: ICD-10-CM

## 2021-11-18 PROCEDURE — G0108 DIAB MANAGE TRN  PER INDIV: HCPCS | Mod: S$GLB,,, | Performed by: DIETITIAN, REGISTERED

## 2021-11-18 PROCEDURE — G0108 PR DIAB MANAGE TRN  PER INDIV: ICD-10-PCS | Mod: S$GLB,,, | Performed by: DIETITIAN, REGISTERED

## 2021-11-29 ENCOUNTER — PATIENT MESSAGE (OUTPATIENT)
Dept: INTERNAL MEDICINE | Facility: CLINIC | Age: 56
End: 2021-11-29
Payer: COMMERCIAL

## 2021-11-29 ENCOUNTER — PATIENT MESSAGE (OUTPATIENT)
Dept: OBSTETRICS AND GYNECOLOGY | Facility: CLINIC | Age: 56
End: 2021-11-29
Payer: COMMERCIAL

## 2021-11-29 DIAGNOSIS — N95.1 HOT FLASHES, MENOPAUSAL: Primary | ICD-10-CM

## 2021-12-07 ENCOUNTER — PATIENT MESSAGE (OUTPATIENT)
Dept: OBSTETRICS AND GYNECOLOGY | Facility: CLINIC | Age: 56
End: 2021-12-07
Payer: COMMERCIAL

## 2021-12-07 ENCOUNTER — PATIENT MESSAGE (OUTPATIENT)
Dept: INTERNAL MEDICINE | Facility: CLINIC | Age: 56
End: 2021-12-07
Payer: COMMERCIAL

## 2021-12-07 RX ORDER — SERTRALINE HYDROCHLORIDE 100 MG/1
100 TABLET, FILM COATED ORAL DAILY
Qty: 90 TABLET | Refills: 3 | Status: SHIPPED | OUTPATIENT
Start: 2021-12-07 | End: 2023-03-08 | Stop reason: SDUPTHER

## 2021-12-09 ENCOUNTER — OFFICE VISIT (OUTPATIENT)
Dept: INTERNAL MEDICINE | Facility: CLINIC | Age: 56
End: 2021-12-09
Payer: COMMERCIAL

## 2021-12-09 DIAGNOSIS — R29.898 LEFT HAND WEAKNESS: ICD-10-CM

## 2021-12-09 DIAGNOSIS — Z02.89 ENCOUNTER FOR COMPLETION OF FORM WITH PATIENT: Primary | ICD-10-CM

## 2021-12-09 DIAGNOSIS — I63.411 EMBOLIC STROKE INVOLVING RIGHT MIDDLE CEREBRAL ARTERY: ICD-10-CM

## 2021-12-09 DIAGNOSIS — H53.462 LEFT HOMONYMOUS HEMIANOPSIA: ICD-10-CM

## 2021-12-09 PROCEDURE — 3066F PR DOCUMENTATION OF TREATMENT FOR NEPHROPATHY: ICD-10-PCS | Mod: CPTII,95,, | Performed by: HOSPITALIST

## 2021-12-09 PROCEDURE — 99213 OFFICE O/P EST LOW 20 MIN: CPT | Mod: 95,,, | Performed by: HOSPITALIST

## 2021-12-09 PROCEDURE — 3061F PR NEG MICROALBUMINURIA RESULT DOCUMENTED/REVIEW: ICD-10-PCS | Mod: CPTII,95,, | Performed by: HOSPITALIST

## 2021-12-09 PROCEDURE — 4010F ACE/ARB THERAPY RXD/TAKEN: CPT | Mod: CPTII,95,, | Performed by: HOSPITALIST

## 2021-12-09 PROCEDURE — 4010F PR ACE/ARB THEARPY RXD/TAKEN: ICD-10-PCS | Mod: CPTII,95,, | Performed by: HOSPITALIST

## 2021-12-09 PROCEDURE — 3066F NEPHROPATHY DOC TX: CPT | Mod: CPTII,95,, | Performed by: HOSPITALIST

## 2021-12-09 PROCEDURE — 3061F NEG MICROALBUMINURIA REV: CPT | Mod: CPTII,95,, | Performed by: HOSPITALIST

## 2021-12-09 PROCEDURE — 99213 PR OFFICE/OUTPT VISIT, EST, LEVL III, 20-29 MIN: ICD-10-PCS | Mod: 95,,, | Performed by: HOSPITALIST

## 2021-12-10 ENCOUNTER — PATIENT MESSAGE (OUTPATIENT)
Dept: INTERNAL MEDICINE | Facility: CLINIC | Age: 56
End: 2021-12-10
Payer: COMMERCIAL

## 2022-01-11 ENCOUNTER — HOME CARE VISIT (OUTPATIENT)
Dept: NEUROLOGY | Facility: HOSPITAL | Age: 57
End: 2022-01-11
Payer: COMMERCIAL

## 2022-01-14 ENCOUNTER — PATIENT MESSAGE (OUTPATIENT)
Dept: PAIN MEDICINE | Facility: CLINIC | Age: 57
End: 2022-01-14
Payer: COMMERCIAL

## 2022-01-14 VITALS
RESPIRATION RATE: 20 BRPM | SYSTOLIC BLOOD PRESSURE: 148 MMHG | OXYGEN SATURATION: 98 % | BODY MASS INDEX: 31 KG/M2 | DIASTOLIC BLOOD PRESSURE: 98 MMHG | HEART RATE: 96 BPM | WEIGHT: 175 LBS

## 2022-01-18 ENCOUNTER — OFFICE VISIT (OUTPATIENT)
Dept: PAIN MEDICINE | Facility: CLINIC | Age: 57
End: 2022-01-18
Attending: ANESTHESIOLOGY
Payer: COMMERCIAL

## 2022-01-18 VITALS
HEIGHT: 63 IN | DIASTOLIC BLOOD PRESSURE: 93 MMHG | BODY MASS INDEX: 32.77 KG/M2 | WEIGHT: 184.94 LBS | RESPIRATION RATE: 18 BRPM | SYSTOLIC BLOOD PRESSURE: 160 MMHG | HEART RATE: 84 BPM

## 2022-01-18 DIAGNOSIS — M47.26 OSTEOARTHRITIS OF SPINE WITH RADICULOPATHY, LUMBAR REGION: Primary | ICD-10-CM

## 2022-01-18 DIAGNOSIS — M48.061 SPINAL STENOSIS OF LUMBAR REGION, UNSPECIFIED WHETHER NEUROGENIC CLAUDICATION PRESENT: ICD-10-CM

## 2022-01-18 PROCEDURE — 99204 PR OFFICE/OUTPT VISIT, NEW, LEVL IV, 45-59 MIN: ICD-10-PCS | Mod: S$GLB,,, | Performed by: ANESTHESIOLOGY

## 2022-01-18 PROCEDURE — 99999 PR PBB SHADOW E&M-EST. PATIENT-LVL V: ICD-10-PCS | Mod: PBBFAC,,, | Performed by: ANESTHESIOLOGY

## 2022-01-18 PROCEDURE — 99999 PR PBB SHADOW E&M-EST. PATIENT-LVL V: CPT | Mod: PBBFAC,,, | Performed by: ANESTHESIOLOGY

## 2022-01-18 PROCEDURE — 99204 OFFICE O/P NEW MOD 45 MIN: CPT | Mod: S$GLB,,, | Performed by: ANESTHESIOLOGY

## 2022-01-18 NOTE — PROGRESS NOTES
Subjective:      Patient ID: Rayne Aaron is a 57 y.o. female.    Chief Complaint: No chief complaint on file.    Referred by: Patricia Agustin NP     Patient presents to clinic with back pain which radiates down both legs. Pain is worse on the right and travels down the lateral part of the hip and leg to the ankle. Anterior hip on the left but travels to the toes.  It is a shooting and tinging sensation. Pain lasts a couple seconds. Bending over and walking long distances will exacerbate her pain. She has never experienced this before but states that it started after he stroke around this time last year. Sleeping is difficult on right side. Pain is 5/10 right now and 10/10 at the worst.     She is on Diclofenac (PO), Gabapentin (300mg TID), and Tylenol PRN.         Imaging    MRI Lumbar Spine Without Contrast    Details    Reading Physician Reading Date Result Priority   Antony David MD  [phone] 8/11/2021 Routine     Narrative & Impression  EXAMINATION:  MRI LUMBAR SPINE WITHOUT CONTRAST     CLINICAL HISTORY:  Back pain or radiculopathy, > 6 wks; Other intervertebral disc degeneration, lumbar region     TECHNIQUE:  Multiplanar, multisequence MR images were acquired from the thoracolumbar junction to the sacrum without the administration of contrast.     COMPARISON:  Lumbar spine radiographs 05/04/2021; CT abdomen/pelvis 04/14/2021     FINDINGS:  Lumbar dextrocurvature centered at L3.  Minimal stepwise retrolisthesis spanning L2-5.     No compression fractures.  No marrow replacing lesions.     Multilevel degenerative disc disease and facet arthropathy, described in detail below.  There is discogenic endplate edema at L4-5.     The conus medullaris has a normal appearance and terminates at the L1 level.  The cauda equina nerve roots are unremarkable.     Paraspinal soft tissues are unremarkable.     SIGNIFICANT FINDINGS BY LEVEL:     T12-L1: Unremarkable.     L1-2: Left facet arthropathy and ligamentum  flavum thickening.  No canal or foraminal stenosis.     L2-3: Disc bulge with superimposed left foraminal extrusion.  Mild canal stenosis.  Mild bilateral foraminal stenosis, left greater than right.     L3-4: Disc bulge.  Bilateral facet arthropathy and ligamentum flavum thickening.  Mild canal stenosis with narrowing of both subarticular zones.  Mild right and moderate left foraminal stenosis.     L4-5: Disc bulge with superimposed right foraminal extrusion.  Bilateral facet arthropathy and ligamentum flavum thickening.  Mild canal stenosis with narrowing of the right subarticular zone.  Moderate right and mild left foraminal stenosis.     L5-S1:: Mild disc bulge.  Bilateral facet arthropathy.  No canal stenosis.  Mild bilateral foraminal stenosis.     Impression:     Lumbar dextrocurvature and multilevel degenerative changes, most advanced at L4-5 where there is mild canal stenosis, moderate right and mild left foraminal stenosis, and discogenic endplate edema.        Electronically signed by: Antony David  Date:                                            08/11/2021  Time:                                           15:54             Exam Ended: 08/11/21 15:30 Last Resulted: 08/11/21 15:54                 Interventional Pain History    Past Medical History:   Diagnosis Date    Diabetes mellitus, type 2     Hyperlipidemia     Hypertension     Multinodular goiter 10/21/2015    Stroke     Subclinical hyperthyroidism 10/21/2015       No past surgical history on file.    Review of patient's allergies indicates:   Allergen Reactions    Penicillins Other (See Comments)     Facial numbness    Sulfa (sulfonamide antibiotics) Rash       Current Outpatient Medications   Medication Sig Dispense Refill    amLODIPine (NORVASC) 2.5 MG tablet Take 1 tablet (2.5 mg total) by mouth once daily. 30 tablet 11    aspirin (ECOTRIN) 81 MG EC tablet Take 1 tablet (81 mg total) by mouth once daily. 180 tablet 2    atorvastatin  "(LIPITOR) 80 MG tablet Take 1 tablet (80 mg total) by mouth once daily. 90 tablet 3    diclofenac (VOLTAREN) 75 MG EC tablet Take 1 tablet (75 mg total) by mouth 2 (two) times daily as needed (pain). 60 tablet 2    dulaglutide (TRULICITY) 3 mg/0.5 mL pen injector Inject 3 mg into the skin every 7 days. 4 pen 4    flash glucose scanning reader (FREESTYLE ANA 14 DAY READER) Misc 1 each by Misc.(Non-Drug; Combo Route) route continuous. 1 each 0    flash glucose sensor (FREESTYLE ANA 14 DAY SENSOR) Kit Change sensor every 14 days. Apply topically to skin as directed. 2 kit 12    gabapentin (NEURONTIN) 300 MG capsule Take 1 capsule (300 mg total) by mouth 3 (three) times daily. 270 capsule 0    insulin aspart U-100 (NOVOLOG) 100 unit/mL injection Inject 80 Units into the skin continuous. Gives up to 80 units daily via omnipod patch. 30 mL 6    metFORMIN (GLUCOPHAGE) 1000 MG tablet Take 1 tablet (1,000 mg total) by mouth 2 (two) times daily with meals. 180 tablet 3    pen needle, diabetic 32 gauge x 5/32" Ndle Uses 3 times a day. 100 each 6    sertraline (ZOLOFT) 100 MG tablet Take 1 tablet (100 mg total) by mouth once daily. 90 tablet 3    sertraline (ZOLOFT) 50 MG tablet Take 1 tablet (50 mg total) by mouth once daily. 90 tablet 3    valsartan (DIOVAN) 320 MG tablet Take 1 tablet (320 mg total) by mouth once daily. 30 tablet 11     No current facility-administered medications for this visit.       Family History   Problem Relation Age of Onset    Dementia Mother     Heart disease Mother     Hypertension Mother     Heart disease Father     Diabetes Father     Diabetes Maternal Grandmother     Hypertension Maternal Grandmother     Ovarian cancer Maternal Grandmother     Depression Maternal Grandfather     Diabetes Paternal Grandmother     Peripheral vascular disease Paternal Grandmother     Hyperthyroidism Sister     Breast cancer Neg Hx     Colon cancer Neg Hx     Thyroid cancer Neg Hx  "       Social History     Socioeconomic History    Marital status:     Number of children: 2   Occupational History    Occupation:     Tobacco Use    Smoking status: Never Smoker    Smokeless tobacco: Never Used   Substance and Sexual Activity    Alcohol use: Not Currently     Alcohol/week: 0.0 standard drinks     Comment: social only    Drug use: No    Sexual activity: Not Currently     Partners: Male     Birth control/protection: None     Social Determinants of Health     Financial Resource Strain: Low Risk     Difficulty of Paying Living Expenses: Not very hard   Food Insecurity: No Food Insecurity    Worried About Running Out of Food in the Last Year: Never true    Ran Out of Food in the Last Year: Never true   Transportation Needs: No Transportation Needs    Lack of Transportation (Medical): No    Lack of Transportation (Non-Medical): No   Physical Activity: Inactive    Days of Exercise per Week: 0 days    Minutes of Exercise per Session: 0 min   Stress: Stress Concern Present    Feeling of Stress : To some extent   Social Connections: Unknown    Frequency of Communication with Friends and Family: Once a week    Frequency of Social Gatherings with Friends and Family: Once a week    Active Member of Clubs or Organizations: No    Attends Club or Organization Meetings: Never    Marital Status:    Housing Stability: Low Risk     Unable to Pay for Housing in the Last Year: No    Number of Places Lived in the Last Year: 1    Unstable Housing in the Last Year: No           Review of Systems   Constitutional: Negative for weight gain and weight loss.   Cardiovascular: Negative.    Respiratory: Negative for shortness of breath.    Skin: Negative.    Gastrointestinal: Positive for constipation and diarrhea. Negative for nausea and vomiting.   Genitourinary: Negative.    Neurological: Negative for headaches and light-headedness.           Objective:   LMP 09/08/2015 (Approximate)    Pain Disability Index Review:  No flowsheet data found.  Normocephalic.  Atraumatic.  Affect appropriate.  Breathing unlabored.  Extra ocular muscles intact.           General    Constitutional: She appears well-developed and well-nourished.     General Musculoskeletal Exam   Gait: antalgic       Right Knee Exam   Right knee exam is normal.    Left Knee Exam   Left knee exam is normal.    Right Hip Exam     Tenderness   The patient tender to palpation of the SI joint and piriformis.    Muscle Strength   The patient has normal right hip strength.    Tests   Pain w/ forced internal rotation (AMARILYS): absent  Left Hip Exam     Tenderness   The patient tender to palpation of the piriformis.    Muscle Strength   The patient has normal left hip strength.     Tests   Pain w/ forced internal rotation (AMARILYS): present      Back (L-Spine & T-Spine) / Neck (C-Spine) Exam     Back (L-Spine & T-Spine) Range of Motion   Lateral bend right: normal   Lateral bend left: normal   Rotation right: normal   Rotation left: normal       Muscle Strength   Right Lower Extremity   Hip Abduction: 5/5   Hip Flexion: 5/5   Hip Extensors: 5/5  Quadriceps:  5/5   Left Lower Extremity   Hip Abduction: 5/5   Hip Flexion: 5/5   Hip Extensors: 5/5  Quadriceps:  5/5     Reflexes     Left Side  Achilles:  2+  Quadriceps:  2+    Right Side   Achilles:  3+  Quadriceps:  3+        Assessment:       Encounter Diagnosis   Name Primary?    Spinal stenosis of lumbar region, unspecified whether neurogenic claudication present          Plan:   We discussed with the patient the assessment and recommendations. The following is the plan we agreed on:    -- We will [place] [facility] and is not sure if she will be able to make it [place] [facility] [place] If she is unable to, will have her do PT at a [place] location  -- Consider intralaminar L4/5 injections in the future if patient is not improving with  PT in 4-6 weeks.         Diagnoses and all orders for this visit:    Osteoarthritis of spine with radiculopathy, lumbar region  -     Ambulatory referral/consult to [facility]; Future  -     Ambulatory referral/consult to Physical/Occupational Therapy; Future    Spinal stenosis of lumbar region, unspecified whether neurogenic claudication present  -     Ambulatory referral/consult to Pain Clinic  -     Ambulatory referral/consult to [facility]; Future  -     Ambulatory referral/consult to Physical/Occupational Therapy; Future       Kee Blanchard MD (PGY-3, CA-2)

## 2022-02-01 ENCOUNTER — CLINICAL SUPPORT (OUTPATIENT)
Dept: REHABILITATION | Facility: HOSPITAL | Age: 57
End: 2022-02-01
Attending: ANESTHESIOLOGY
Payer: COMMERCIAL

## 2022-02-01 DIAGNOSIS — M54.42 CHRONIC BILATERAL LOW BACK PAIN WITH BILATERAL SCIATICA: ICD-10-CM

## 2022-02-01 DIAGNOSIS — G89.29 CHRONIC BILATERAL LOW BACK PAIN WITH BILATERAL SCIATICA: ICD-10-CM

## 2022-02-01 DIAGNOSIS — M54.41 CHRONIC BILATERAL LOW BACK PAIN WITH BILATERAL SCIATICA: ICD-10-CM

## 2022-02-01 DIAGNOSIS — M47.26 OSTEOARTHRITIS OF SPINE WITH RADICULOPATHY, LUMBAR REGION: ICD-10-CM

## 2022-02-01 DIAGNOSIS — M48.061 SPINAL STENOSIS OF LUMBAR REGION, UNSPECIFIED WHETHER NEUROGENIC CLAUDICATION PRESENT: ICD-10-CM

## 2022-02-01 PROCEDURE — 97162 PT EVAL MOD COMPLEX 30 MIN: CPT | Performed by: PHYSICAL THERAPIST

## 2022-02-02 PROBLEM — G89.29 CHRONIC BILATERAL LOW BACK PAIN WITH BILATERAL SCIATICA: Status: ACTIVE | Noted: 2022-02-02

## 2022-02-02 PROBLEM — M54.42 CHRONIC BILATERAL LOW BACK PAIN WITH BILATERAL SCIATICA: Status: ACTIVE | Noted: 2022-02-02

## 2022-02-02 PROBLEM — M54.41 CHRONIC BILATERAL LOW BACK PAIN WITH BILATERAL SCIATICA: Status: ACTIVE | Noted: 2022-02-02

## 2022-02-02 NOTE — PLAN OF CARE
[facility]  Physical Therapy Initial Evaluation    Date: 2/1/2022   Name: Rayne Aaron  Clinic Number: [id number]    Therapy Diagnosis:   Encounter Diagnoses   Name Primary?    Spinal stenosis of lumbar region, unspecified whether neurogenic claudication present     Osteoarthritis of spine with radiculopathy, lumbar region     Chronic bilateral low back pain with bilateral sciatica      Physician: Saud Mendez MD    Physician Orders: PT Eval and Treat   Medical Diagnosis from Referral:   M48.061 (ICD-10-CM) - Spinal stenosis of lumbar region, unspecified whether neurogenic claudication present   M47.26 (ICD-10-CM) - Osteoarthritis of spine with radiculopathy, lumbar region       Evaluation Date: 2/1/2022  Authorization Period Expiration: 1/18/2023  Plan of Care Expiration: 5/1/2022  Visit # / Visits authorized: 1/ 1    Time In: 1800  Time Out: 1855  Total Appointment Time (timed & untimed codes): 55 minutes    Precautions: Diabetes    Subjective   Date of onset: 13 months  History of current condition - Rayne reports: Patient reports she had a stroke 13 months ago and has been getting tingling into the legs ever since. Originally her left arm was affected but her right leg is her chief complaint today. Patient also notes radicular symptoms on the outside of her left leg. Symptoms are worse in standing after about 15 minutes and sitting for 2 hours. Patient recently lost her  and suffered the stroke after not taking care of her own health, starting a new job, and increased stress. Patient is working from home and takes multiple rest breaks as neede     Medical History:   Past Medical History:   Diagnosis Date    Diabetes mellitus, type 2     Hyperlipidemia     Hypertension     Multinodular goiter 10/21/2015    Stroke     Subclinical hyperthyroidism 10/21/2015       Surgical History:   Rayne Aaron  has no past surgical history on file.    Medications:   Rayne has a  current medication list which includes the following prescription(s): amlodipine, aspirin, atorvastatin, diclofenac, trulicity, freestyle sami 14 day reader, freestyle sami 14 day sensor, gabapentin, insulin aspart u-100, metformin, pen needle, diabetic, sertraline, and valsartan.    Allergies:   Review of patient's allergies indicates:   Allergen Reactions    Penicillins Other (See Comments)     Facial numbness    Sulfa (sulfonamide antibiotics) Rash        Imaging, xray: MRI       Impression:     4 mm of retrolisthesis of L2 on L3 on the neutral view that increases to 6 mm on the extension view and reduces to 2 mm on the flexion view.     Moderate L3-L4 and L4-S5 degenerative disc disease, not substantially changed when compared to 05/04/2021.    Impression:     Lumbar dextrocurvature and multilevel degenerative changes, most advanced at L4-5 where there is mild canal stenosis, moderate right and mild left foraminal stenosis, and discogenic endplate edema.    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 female presenting alert and oriented    Lumbar Range of Motion:    Limitations Pain   Flexion 50%   Midline R>L        Extension 25   pain        Left Side Bending 50 -        Right Side Bending 50 -            Lower Extremity Strength  Right LE  Left LE    Quadriceps: 4/5 Quadriceps: 4/5   Hamstrings: 4+/5 Hamstrings: 4+/5   Iliospoas: 4+/5 Iliospoas: 4+/5   Hip extension:  4-/5 Hip extension: 4-/5   PGM: 3-/5 PGM: 3-/5   Hip  ER:  4-/5 Hip ER: 4-/5   Hip IR: 4+/5 Hip IR: 4+/5   Ankle dorsiflexion: 4/5 Ankle dorsiflexion: 4/5   Ankle plantarflexion: 4+/5 Ankle plantarflexion: 4+/5     Sensation: intact    Reflexes:  -Patellar (L3-L4): 1+  -Achilles (S1): 1+    Special Tests:  -SLR Test: +  -Repeated Flexion: relief  -Repeated Ext: pain  -Prone Instability Test: -  -Bridge Test: -  -Slump Test: + R     SI Special Tests:   Distraction: -  Compression: -  SI thigh thrust: -  Sacral thrust: -  Flick test: -    Joint Mobility:   -Shear testing:-  -Segmental mobility testing:Limited L3-5    Lumbar Protective Mechanisms:  -Compression:NT  -Rotation:NT  -Elbow rotation:NT    MSI Observation    Bent Knee Fall Out nt   Alt March nt   Hand Heel Rock nt     Palpation:   -Erector Spinae: guarded  -Multifidi activation: poor    Flexibility: -  -Ely's test: R = - ; L = -  -Hamstring : R = -30 ; L = -30  -Catina's test: R = - ; L = -      Limitation/Restriction for FOTO Lumbar Survey    Therapist reviewed FOTO scores for Rayne Aaron on 2/1/2022.   FOTO documents entered into [facility] - see Media section.    Limitation Score: see media         Home Exercises and Patient Education Provided    Education provided:   - Flexion based exercise in bed to improve symptoms in the morning    Written Home Exercises Provided: yes.  Exercises were reviewed and patient was able to demonstrate them prior to the end of the session.  Patient demonstrated good  understanding of the education provided.     See EMR under Patient Instructions for exercisesprovided 2/1/2022.    Assessment   Rayne is a 57 y.o. female referred to outpatient Physical Therapy with a medical diagnosis of lumbar spinal stenosis. Pt presents with limited lumbar motion, strength deficits to the LE, pain when completing ADLs, unable to sit or stand for a prolonged period of time, and radicular symptoms down the LE     Pt prognosis is Fair.   Pt will benefit from skilled outpatient Physical Therapy to  address the deficits stated above and in the chart below, provide pt/family education, and to maximize pt's level of independence.     Plan of care discussed with patient: Yes  Pt's spiritual, cultural and educational needs considered and patient is agreeable to the plan of care and goals as stated below:     Anticipated Barriers for therapy: covid-19    Medical Necessity is demonstrated by the following  History  Co-morbidities and personal factors that may impact the plan of care Co-morbidities:   depression, diabetes, difficulty sleeping, high BMI, history of CVA, HTN and level of undertstanding of current condition    Personal Factors:   no deficits     high   Examination  Body Structures and Functions, activity limitations and participation restrictions that may impact the plan of care Body Regions:   back  lower extremities    Body Systems:    ROM  strength  balance  gait  transfers  transitions  motor control  motor learning  edema    Participation Restrictions:   covid-19    Activity limitations:   Learning and applying knowledge  no deficits    General Tasks and Commands  no deficits    Communication  no deficits    Mobility  lifting and carrying objects  walking    Self care  washing oneself (bathing, drying, washing hands)  caring for body parts (brushing teeth, shaving, grooming)  dressing    Domestic Life  no deficits    Interactions/Relationships  no deficits    Life Areas  no deficits    Community and Social Life  no deficits         moderate   Clinical Presentation evolving clinical presentation with changing clinical characteristics moderate   Decision Making/ Complexity Score: moderate     GOALS: Short Term Goals:  3 weeks  1.Report decreased low back pain  < / =  2/10  to increase tolerance for ambulation in the clinic   2. Increase ROM by 5 degrees where limited in order to perform ADLs without difficulty.  3. Increase strength by 1/3 MMT grade in gluts to increase tolerance for ADL and work  activities.  4. Pt to tolerate HEP to improve ROM and independence with ADL's    Long Term Goals: 8 weeks  1.Report decreased low back pain < / = 0/10  to increase tolerance for return to work pain free  2.Patient goal: return to full day without low back pain   3.Increase strength to 4+/5 in  gluts  to increase tolerance for ADL and work activities.  4. Pt will report at goals  on FOTO  to demonstrate increase in LE function with every day tasks.       Plan   Plan of care Certification: 2/1/2022 to 5/1/2022.    Outpatient Physical Therapy 2 times weekly for 10 weeks to include the following interventions: Cervical/Lumbar Traction, Gait Training, Manual Therapy, Moist Heat/ Ice, Neuromuscular Re-ed, Patient Education, Self Care, Therapeutic Activities and Therapeutic Exercise.     Cooper Peterson, PT, DPT, OCS

## 2022-02-03 ENCOUNTER — PATIENT MESSAGE (OUTPATIENT)
Dept: INTERNAL MEDICINE | Facility: CLINIC | Age: 57
End: 2022-02-03
Payer: COMMERCIAL

## 2022-02-03 NOTE — TELEPHONE ENCOUNTER
Pt states her insurance company will not pay for the omnipod patch nor the insulin prescribed. The insulin approved is humalog

## 2022-02-04 ENCOUNTER — LAB VISIT (OUTPATIENT)
Dept: LAB | Facility: HOSPITAL | Age: 57
End: 2022-02-04
Attending: NURSE PRACTITIONER
Payer: COMMERCIAL

## 2022-02-04 DIAGNOSIS — E11.59 HYPERTENSION ASSOCIATED WITH DIABETES: ICD-10-CM

## 2022-02-04 DIAGNOSIS — I15.2 HYPERTENSION ASSOCIATED WITH DIABETES: ICD-10-CM

## 2022-02-04 DIAGNOSIS — E05.90 SUBCLINICAL HYPERTHYROIDISM: ICD-10-CM

## 2022-02-04 DIAGNOSIS — E11.69 HYPERLIPIDEMIA ASSOCIATED WITH TYPE 2 DIABETES MELLITUS: ICD-10-CM

## 2022-02-04 DIAGNOSIS — E11.65 TYPE 2 DIABETES MELLITUS WITH HYPERGLYCEMIA, WITH LONG-TERM CURRENT USE OF INSULIN: ICD-10-CM

## 2022-02-04 DIAGNOSIS — E78.5 HYPERLIPIDEMIA ASSOCIATED WITH TYPE 2 DIABETES MELLITUS: ICD-10-CM

## 2022-02-04 DIAGNOSIS — Z79.4 TYPE 2 DIABETES MELLITUS WITH HYPERGLYCEMIA, WITH LONG-TERM CURRENT USE OF INSULIN: ICD-10-CM

## 2022-02-04 LAB
ALBUMIN SERPL BCP-MCNC: 3.8 G/DL (ref 3.5–5.2)
ALP SERPL-CCNC: 82 U/L (ref 55–135)
ALT SERPL W/O P-5'-P-CCNC: 17 U/L (ref 10–44)
ANION GAP SERPL CALC-SCNC: 9 MMOL/L (ref 8–16)
AST SERPL-CCNC: 13 U/L (ref 10–40)
BILIRUB SERPL-MCNC: 0.4 MG/DL (ref 0.1–1)
BUN SERPL-MCNC: 17 MG/DL (ref 6–20)
CALCIUM SERPL-MCNC: 9.5 MG/DL (ref 8.7–10.5)
CHLORIDE SERPL-SCNC: 104 MMOL/L (ref 95–110)
CHOLEST SERPL-MCNC: 230 MG/DL (ref 120–199)
CHOLEST/HDLC SERPL: 4.6 {RATIO} (ref 2–5)
CO2 SERPL-SCNC: 26 MMOL/L (ref 23–29)
CREAT SERPL-MCNC: 0.7 MG/DL (ref 0.5–1.4)
EST. GFR  (AFRICAN AMERICAN): >60 ML/MIN/1.73 M^2
EST. GFR  (NON AFRICAN AMERICAN): >60 ML/MIN/1.73 M^2
ESTIMATED AVG GLUCOSE: 137 MG/DL (ref 68–131)
GLUCOSE SERPL-MCNC: 113 MG/DL (ref 70–110)
HBA1C MFR BLD: 6.4 % (ref 4–5.6)
HDLC SERPL-MCNC: 50 MG/DL (ref 40–75)
HDLC SERPL: 21.7 % (ref 20–50)
LDLC SERPL CALC-MCNC: 143.8 MG/DL (ref 63–159)
NONHDLC SERPL-MCNC: 180 MG/DL
POTASSIUM SERPL-SCNC: 3.8 MMOL/L (ref 3.5–5.1)
PROT SERPL-MCNC: 7.2 G/DL (ref 6–8.4)
SODIUM SERPL-SCNC: 139 MMOL/L (ref 136–145)
T4 FREE SERPL-MCNC: 0.81 NG/DL (ref 0.71–1.51)
TRIGL SERPL-MCNC: 181 MG/DL (ref 30–150)
TSH SERPL DL<=0.005 MIU/L-ACNC: 4.59 UIU/ML (ref 0.4–4)

## 2022-02-04 PROCEDURE — 84439 ASSAY OF FREE THYROXINE: CPT | Performed by: NURSE PRACTITIONER

## 2022-02-04 PROCEDURE — 80053 COMPREHEN METABOLIC PANEL: CPT | Performed by: NURSE PRACTITIONER

## 2022-02-04 PROCEDURE — 84443 ASSAY THYROID STIM HORMONE: CPT | Performed by: NURSE PRACTITIONER

## 2022-02-04 PROCEDURE — 83036 HEMOGLOBIN GLYCOSYLATED A1C: CPT | Performed by: NURSE PRACTITIONER

## 2022-02-04 PROCEDURE — 80061 LIPID PANEL: CPT | Performed by: NURSE PRACTITIONER

## 2022-02-04 PROCEDURE — 36415 COLL VENOUS BLD VENIPUNCTURE: CPT | Mod: PO | Performed by: NURSE PRACTITIONER

## 2022-02-08 ENCOUNTER — OFFICE VISIT (OUTPATIENT)
Dept: INTERNAL MEDICINE | Facility: CLINIC | Age: 57
End: 2022-02-08
Payer: COMMERCIAL

## 2022-02-08 VITALS
OXYGEN SATURATION: 100 % | WEIGHT: 187.38 LBS | SYSTOLIC BLOOD PRESSURE: 124 MMHG | HEART RATE: 87 BPM | DIASTOLIC BLOOD PRESSURE: 80 MMHG | TEMPERATURE: 98 F | HEIGHT: 63 IN | BODY MASS INDEX: 33.2 KG/M2 | RESPIRATION RATE: 14 BRPM

## 2022-02-08 DIAGNOSIS — Z79.4 TYPE 2 DIABETES MELLITUS WITH HYPERGLYCEMIA, WITH LONG-TERM CURRENT USE OF INSULIN: ICD-10-CM

## 2022-02-08 DIAGNOSIS — E11.59 HYPERTENSION ASSOCIATED WITH DIABETES: ICD-10-CM

## 2022-02-08 DIAGNOSIS — M54.42 CHRONIC BILATERAL LOW BACK PAIN WITH BILATERAL SCIATICA: ICD-10-CM

## 2022-02-08 DIAGNOSIS — E11.69 HYPERLIPIDEMIA ASSOCIATED WITH TYPE 2 DIABETES MELLITUS: ICD-10-CM

## 2022-02-08 DIAGNOSIS — E78.5 HYPERLIPIDEMIA ASSOCIATED WITH TYPE 2 DIABETES MELLITUS: ICD-10-CM

## 2022-02-08 DIAGNOSIS — I63.411 EMBOLIC STROKE INVOLVING RIGHT MIDDLE CEREBRAL ARTERY: ICD-10-CM

## 2022-02-08 DIAGNOSIS — M54.41 CHRONIC BILATERAL LOW BACK PAIN WITH BILATERAL SCIATICA: ICD-10-CM

## 2022-02-08 DIAGNOSIS — E04.2 MULTINODULAR GOITER: ICD-10-CM

## 2022-02-08 DIAGNOSIS — G89.29 CHRONIC BILATERAL LOW BACK PAIN WITH BILATERAL SCIATICA: ICD-10-CM

## 2022-02-08 DIAGNOSIS — Z78.9 IMPAIRED INSTRUMENTAL ACTIVITIES OF DAILY LIVING (IADL): ICD-10-CM

## 2022-02-08 DIAGNOSIS — E66.01 SEVERE OBESITY: ICD-10-CM

## 2022-02-08 DIAGNOSIS — I15.2 HYPERTENSION ASSOCIATED WITH DIABETES: ICD-10-CM

## 2022-02-08 DIAGNOSIS — E03.9 HYPOTHYROIDISM, UNSPECIFIED TYPE: Primary | ICD-10-CM

## 2022-02-08 DIAGNOSIS — E11.65 TYPE 2 DIABETES MELLITUS WITH HYPERGLYCEMIA, WITH LONG-TERM CURRENT USE OF INSULIN: ICD-10-CM

## 2022-02-08 PROCEDURE — 1160F PR REVIEW ALL MEDS BY PRESCRIBER/CLIN PHARMACIST DOCUMENTED: ICD-10-PCS | Mod: CPTII,S$GLB,, | Performed by: NURSE PRACTITIONER

## 2022-02-08 PROCEDURE — 1160F RVW MEDS BY RX/DR IN RCRD: CPT | Mod: CPTII,S$GLB,, | Performed by: NURSE PRACTITIONER

## 2022-02-08 PROCEDURE — 99999 PR PBB SHADOW E&M-EST. PATIENT-LVL V: ICD-10-PCS | Mod: PBBFAC,,, | Performed by: NURSE PRACTITIONER

## 2022-02-08 PROCEDURE — 3008F BODY MASS INDEX DOCD: CPT | Mod: CPTII,S$GLB,, | Performed by: NURSE PRACTITIONER

## 2022-02-08 PROCEDURE — 4010F PR ACE/ARB THEARPY RXD/TAKEN: ICD-10-PCS | Mod: CPTII,S$GLB,, | Performed by: NURSE PRACTITIONER

## 2022-02-08 PROCEDURE — 95251 CONT GLUC MNTR ANALYSIS I&R: CPT | Mod: S$GLB,,, | Performed by: NURSE PRACTITIONER

## 2022-02-08 PROCEDURE — 3008F PR BODY MASS INDEX (BMI) DOCUMENTED: ICD-10-PCS | Mod: CPTII,S$GLB,, | Performed by: NURSE PRACTITIONER

## 2022-02-08 PROCEDURE — 3044F HG A1C LEVEL LT 7.0%: CPT | Mod: CPTII,S$GLB,, | Performed by: NURSE PRACTITIONER

## 2022-02-08 PROCEDURE — 95251 PR GLUCOSE MONITOR, 72 HOUR, PHYS INTERP: ICD-10-PCS | Mod: S$GLB,,, | Performed by: NURSE PRACTITIONER

## 2022-02-08 PROCEDURE — 99215 OFFICE O/P EST HI 40 MIN: CPT | Mod: S$GLB,,, | Performed by: NURSE PRACTITIONER

## 2022-02-08 PROCEDURE — 3074F SYST BP LT 130 MM HG: CPT | Mod: CPTII,S$GLB,, | Performed by: NURSE PRACTITIONER

## 2022-02-08 PROCEDURE — 99999 PR PBB SHADOW E&M-EST. PATIENT-LVL V: CPT | Mod: PBBFAC,,, | Performed by: NURSE PRACTITIONER

## 2022-02-08 PROCEDURE — 99215 PR OFFICE/OUTPT VISIT, EST, LEVL V, 40-54 MIN: ICD-10-PCS | Mod: S$GLB,,, | Performed by: NURSE PRACTITIONER

## 2022-02-08 PROCEDURE — 1159F MED LIST DOCD IN RCRD: CPT | Mod: CPTII,S$GLB,, | Performed by: NURSE PRACTITIONER

## 2022-02-08 PROCEDURE — 3044F PR MOST RECENT HEMOGLOBIN A1C LEVEL <7.0%: ICD-10-PCS | Mod: CPTII,S$GLB,, | Performed by: NURSE PRACTITIONER

## 2022-02-08 PROCEDURE — 4010F ACE/ARB THERAPY RXD/TAKEN: CPT | Mod: CPTII,S$GLB,, | Performed by: NURSE PRACTITIONER

## 2022-02-08 PROCEDURE — 3074F PR MOST RECENT SYSTOLIC BLOOD PRESSURE < 130 MM HG: ICD-10-PCS | Mod: CPTII,S$GLB,, | Performed by: NURSE PRACTITIONER

## 2022-02-08 PROCEDURE — 3079F DIAST BP 80-89 MM HG: CPT | Mod: CPTII,S$GLB,, | Performed by: NURSE PRACTITIONER

## 2022-02-08 PROCEDURE — 3079F PR MOST RECENT DIASTOLIC BLOOD PRESSURE 80-89 MM HG: ICD-10-PCS | Mod: CPTII,S$GLB,, | Performed by: NURSE PRACTITIONER

## 2022-02-08 PROCEDURE — 1159F PR MEDICATION LIST DOCUMENTED IN MEDICAL RECORD: ICD-10-PCS | Mod: CPTII,S$GLB,, | Performed by: NURSE PRACTITIONER

## 2022-02-08 RX ORDER — FLASH GLUCOSE SENSOR
1 KIT MISCELLANEOUS DAILY
Qty: 6 KIT | Refills: 3 | Status: SHIPPED | OUTPATIENT
Start: 2022-02-08 | End: 2022-03-08

## 2022-02-08 RX ORDER — INSULIN LISPRO 100 [IU]/ML
80 INJECTION, SOLUTION INTRAVENOUS; SUBCUTANEOUS CONTINUOUS
Qty: 40 ML | Refills: 11 | Status: SHIPPED | OUTPATIENT
Start: 2022-02-08 | End: 2022-08-03

## 2022-02-08 RX ORDER — VALSARTAN 320 MG/1
320 TABLET ORAL DAILY
Qty: 90 TABLET | Refills: 3 | Status: SHIPPED | OUTPATIENT
Start: 2022-02-08 | End: 2023-03-08 | Stop reason: SDUPTHER

## 2022-02-08 RX ORDER — ATORVASTATIN CALCIUM 80 MG/1
80 TABLET, FILM COATED ORAL NIGHTLY
Qty: 90 TABLET | Refills: 3 | Status: SHIPPED | OUTPATIENT
Start: 2022-02-08 | End: 2023-02-09

## 2022-02-08 RX ORDER — FLASH GLUCOSE SENSOR
1 KIT MISCELLANEOUS DAILY
Qty: 2 KIT | Refills: 12 | Status: SHIPPED | OUTPATIENT
Start: 2022-02-08 | End: 2022-02-08 | Stop reason: SDUPTHER

## 2022-02-08 RX ORDER — GABAPENTIN 300 MG/1
300 CAPSULE ORAL 3 TIMES DAILY
Qty: 270 CAPSULE | Refills: 1 | Status: SHIPPED | OUTPATIENT
Start: 2022-02-08 | End: 2023-03-08 | Stop reason: SDUPTHER

## 2022-02-08 NOTE — PROGRESS NOTES
57 y.o. female, here for routine 3 month follow up visit - for management of T2dm -   Last seen in November 2021 - those notes are below.     a1c is decreased from 6.9% to 6.4%.   Reports she has gained weight from holiday times - wasn't eating that great.   However is trying to restart better habits.   History of stroke (right MCA) -  impaired mobility at times - however she is back to work now.     Continues on metformin 1000 bid.    trulicity 3mg every week.   Had tried to add her on sglt2i in past, but it was too expensive and she really isn't privy to adding more medications at this time.   Switched her from vgo to omnipod - she is on older version (not dash) - gets through dme.   Was on novolog and now needs humalog rx today - her insurance will only cover.     omnipod downloaded - on [employer] today -   Total daily dose is 23.2 units/day. She is needing much less insulin.   69% of insulin is coming from basal rate.   31% of insulin is coming from boluses.   Using pre-set boluses -   Basal rate is at 0.7 units/hour.   Active insulin time is set at 4 hours.  ISF set at 0 - does preset boluses - 4 or 6 units.   2 units for a snack.     On free style sami - downloaded and reviewed today -   Average glucose is 145   a1c estimated @ 6.8%   81% time in range.   0% lows.   17% highs.   2% extreme highs.     History of hyperthyroidism borderline in the past - her tft's have been stable since -   Her tsh today is slightly elevated at 4.588 - reports she is feeling sometimes fatigued.   Sometimes having night sweats, and has also gained weight over the past few months.   She is on zoloft - had dose increased and this has helped.       Last visit notes as follows from 11/2021:   56 y.o. female, here for follow up visit for management of T2dm -   Last seen 7/8/21 - those notes are below.     a1c more controlled @ 6.9%.   Trulicity 3 mg every week. No side effects (we had just increased her from the 1.5mg to 3mg every week).  "  Also on metformin 1000 mg twice per day.   On vgo 20 - 3 clicks with meal for most part - sometimes gives extra click for a "correction" if the bg is > 180 -   1 click with snack on occasion.   Using sami  - she met with diabetic educator -   She just downloaded it on her phone today - forgot her reader.   So not much data.   Average bg is 147 -   100% tir - see report scanned in .     Reports that she does have some lower sugar readings over night - sometimes 70 - 80's   But most of the time 100 - 130's during the day.   Not exercising a lot - has some back issues/so prohibited a bit from this.   Continuing on gabapentin which helps with her neuropathy in legs.   Also taking diclofenac 2 times per day which helps.   She is trying to get more active and is walking her dogs (she has 3 of them ) 3 times per day for walks.       Last visit notes as follows from 7/8/2021:   56 y.o. female, here for 3 month follow up visit for management of T2dm   Last seen April 2021 - those notes below.     a1c now improved @ 7.1%.   She is on metformin 1000 bid.   trulicity 1.5mg every week   And vgo 20 -     Continues on freestyle sami personal cgm -   Downloaded and reviewed today - see scanned in . -   Average bg is 143 -   a1c estimated at 6.7%.   85% TIR  0% lows.   1% very lows - 1 occasion.   13% highs.   1% extreme highs.         Last visit notes as follows from April 2021:  56 y.o. female here for follow up visit for T2dm, last seen 2/25/2021 - thos notes are blow.     a1c was last @ 13.7% and is now estimated at 6.2% on her cgm.   Labs reveal a1c currently is at 7.9%.   She has great trends. Feeling better. She is extremely happy with her results and quick turn around improvement.   Eating healthier, history of a stroke, but very motivated and refuses to 'be sick again".     Patient continues on metformin 1000 mg twice per day.   Also on trulicity 1.5mg every week.   In the interim, I switched her from MDI " to vgo 20 - doing 3 clicks with meals tid.   Occasional clicks with snacks.   Met with tuyet sanchez and trained on vgo 3/5/2021 -     Had tried to start her on farxiga or jardiance, but too expensive on her plan,   And prefers to stay on same regimen.     Free style sami - interpreted today and see scanned in to .   Average glucose is 119.   a1c estimated at 6.2%.   91% time in range.   3% lows (happening after meal time boluses and over night).   6%  Highs.       Last OV notes as follows:   56 y.o. female, here for 6 week follow up for T2dm management.   Last seen 1/26/2021 - those notes below.     a1c had been at 13.7%, but she had stopped her mealt kasia insulin and her glp1 before seeing me.   Continues on metformin 1000mg twice per day.   She is now on trulicity 1.5mg every week on fridays. Tolerating well - she's had some weight loss - 5 lbs since last visit.   Feels it does suppress appetite somewhat.   Continues on Levemir 30 units every night now. (was on lower amount 25 units).   She is back on novolog prior to meals - tid - 6 units. Reports it is difficult however to remember meal time boluses.   She is on injections 4 times per day. titrates her insulin based on self testing results.   She if finger sticking. dexcom was approved. But cost is high on her current insurance plan as she hasn't met her deductible yet.   States sugars are improved - no logs with her today -   States sugars fasting are running 170 - 200's now, so improved from 300's from past visit.   No acute complaints today's visit.       Last visit notes from 1/26/2021 as follows:   HPI: Rayne Aaron is a 57 y.o.  female c/I for visit to address Diabetes Type 2  This is the first time I am seeing her for care, follows with Dr. Deb Ware MD for primary care needs.   She has seen Donna George NP in the past for management for her diabetes. At [facility].   [facility], so wanted to switch  providers.     was diagnosed with T2DM in 2009.    Her father, paternal grandparents and brothers and sisters all have T2dm. (9 siblings total)  Denies missing doses of DM medication.   Has been on and off medications through the years -  was on insulin, then controlled and taken off.   Also reports stopping/ran out last year in 2019.     Now has restarted her insulin over the past month following a stroke.   Most recently was diagnosed with stroke in December 2020.   No deficits. She has recovered and is back working at home.   She is motivated to get her diabetes under control and prevent further progression of her diabetes.   She does not want another stroke.     Denies any other complications from diabetes   Denies nephropathy.   Denies Diabetic retinopathy.   + HTN, + HLD.   + stroke - now on aspirin, plavix and statin.     Last saw Donna George NP in 4/2019 -   Her a1c was > 14% at that time. And she had been off of her insulin at that time for about 3 months.   Current a1c is still high at 13.7%.   Had been ordered a vgo patch, but patient states she is not sure that it was ever approved.   She also had wanted to be on a cgm to avoid the frequent fingersticks, but states her insurance company never approved.      Current regimen:  Levemir 26 U HS  (previously on novolog insulin 8 units with meals - but ran out of rx. Hospital did not restart).   Metformin 1000 mg BID  Was on trulicity weekly and ozempic at one point weekly - but ran out, then insurance stopped approving.   Denies any known side effects to GLP1's.     She is checking her glucoses 4 times per day.   Reports history of hypoglycemic episodes and unawareness.   No bg logs - but reports sugars range from 250 - 350's overall.       Past medical History:   Past Medical History:   Diagnosis Date    Diabetes mellitus, type 2     Hyperlipidemia     Hypertension     Multinodular goiter 10/21/2015    Stroke     Subclinical hyperthyroidism  "10/21/2015      Family hx:   Family History   Problem Relation Age of Onset    Dementia Mother     Heart disease Mother     Hypertension Mother     Heart disease Father     Diabetes Father     Diabetes Maternal Grandmother     Hypertension Maternal Grandmother     Ovarian cancer Maternal Grandmother     Depression Maternal Grandfather     Diabetes Paternal Grandmother     Peripheral vascular disease Paternal Grandmother     Hyperthyroidism Sister     Breast cancer Neg Hx     Colon cancer Neg Hx     Thyroid cancer Neg Hx       Current meds:   Current Outpatient Medications:     amLODIPine (NORVASC) 2.5 MG tablet, Take 1 tablet (2.5 mg total) by mouth once daily., Disp: 30 tablet, Rfl: 11    diclofenac (VOLTAREN) 75 MG EC tablet, Take 1 tablet (75 mg total) by mouth 2 (two) times daily as needed (pain)., Disp: 60 tablet, Rfl: 2    dulaglutide (TRULICITY) 3 mg/0.5 mL pen injector, Inject 3 mg into the skin every 7 days., Disp: 4 pen, Rfl: 4    flash glucose scanning reader (FREESTYLE ANA 14 DAY READER) Misc, 1 each by Misc.(Non-Drug; Combo Route) route continuous., Disp: 1 each, Rfl: 0    metFORMIN (GLUCOPHAGE) 1000 MG tablet, Take 1 tablet (1,000 mg total) by mouth 2 (two) times daily with meals., Disp: 180 tablet, Rfl: 3    pen needle, diabetic 32 gauge x 5/32" Ndle, Uses 3 times a day., Disp: 100 each, Rfl: 6    sertraline (ZOLOFT) 100 MG tablet, Take 1 tablet (100 mg total) by mouth once daily., Disp: 90 tablet, Rfl: 3    aspirin (ECOTRIN) 81 MG EC tablet, Take 1 tablet (81 mg total) by mouth once daily., Disp: 180 tablet, Rfl: 2    atorvastatin (LIPITOR) 80 MG tablet, Take 1 tablet (80 mg total) by mouth every evening., Disp: 90 tablet, Rfl: 3    flash glucose sensor (FREESTYLE ANA 14 DAY SENSOR) Kit, Change sensor every 14 days. Apply topically to skin as directed., Disp: 6 kit, Rfl: 3    gabapentin (NEURONTIN) 300 MG capsule, Take 1 capsule (300 mg total) by mouth 3 (three) times " daily., Disp: 270 capsule, Rfl: 1    insulin lispro (HUMALOG U-100 INSULIN) 100 unit/mL injection, Inject 80 Units into the skin continuous. Gives up to 80units daily via Omnipod insulin pump. Do not directly inject. Only use within insulin pump, Disp: 40 mL, Rfl: 11    valsartan (DIOVAN) 320 MG tablet, Take 1 tablet (320 mg total) by mouth once daily., Disp: 90 tablet, Rfl: 3     Current Diabetes medications:    novolog insulin via Omnipod pump (formerly on vgo).   Metformin 1000 mg po bid with food  trulicity 1.5mg sc weekly.      Medications Tried and Failed:   Had been on trulicity in past   ozempic in past.    Long acting insulin:  Levemir 30 units every HS.   Short acting insulin: novolog 6 units tid ac meals.  Was on VGO in past. Now switched to omnipod (insurance coverage).     Review of Pertinent co-morbidities/risk factors:   CV: Denies history of MI. + stroke.   CAD: Denies.  Takes aspirin 81mg tablet daily and plavix also.   BP: has history of HTN  Statin: Taking  ACE/ARB: Taking    Social History     Tobacco Use   Smoking Status Never Smoker   Smokeless Tobacco Never Used     Social:   Lives at home with her son.   Life changes/stressors currently:  passed away in October 2020 - so has been stressed since then.   Just started new job in December.   Diet: not always following ADA diet   Meals: 3 per day and snacks.        Breakfast - 1/2 of a muffin or banana       Lunch - subway, sandwich at home. Veggies.        Dinner - fish, smoked brisquet, steak. Potatoes.        Snacks - sugar free chocolate pudding. Yogurt, mixed fruit        Drinks - diet pepsi, diet coke, water. Flavored arredondo  Exercise: walking dogs.   Activities: working from Home. .     Glucose Monitoring:   Now on free style sami cgm  Previoulsy, was Checking sugars 4x/day by fingerstick.   Gets supplies from pharmacy.   Gets Omnipods (older version ) - mailed to her - through dme - she is not sure which  "company.     Standards of care:   Eyes: .: 02/18/2021  Foot exam: : 01/26/2021   Diabetes education:  Yes - February 2021.    Vital Signs  /80 (BP Location: Right arm, Patient Position: Sitting, BP Method: Medium (Manual))   Pulse 87   Temp 97.9 °F (36.6 °C) (Temporal)   Resp 14   Ht 5' 3" (1.6 m)   Wt 85 kg (187 lb 6.3 oz)   LMP 09/08/2015 (Approximate)   SpO2 100%   BMI 33.19 kg/m²     Pertinent Labs:   Hgba1c   Lab Results   Component Value Date    HGBA1C 6.4 (H) 02/04/2022    HGBA1C 6.9 (H) 09/30/2021    HGBA1C 7.1 (H) 07/02/2021     Lipid panel   Lab Results   Component Value Date    CHOL 230 (H) 02/04/2022    CHOL 152 09/30/2021    CHOL 160 07/02/2021     Lab Results   Component Value Date    HDL 50 02/04/2022    HDL 47 09/30/2021    HDL 48 07/02/2021     Lab Results   Component Value Date    LDLCALC 143.8 02/04/2022    LDLCALC 90.0 09/30/2021    LDLCALC 91.8 07/02/2021     Lab Results   Component Value Date    TRIG 181 (H) 02/04/2022    TRIG 75 09/30/2021    TRIG 101 07/02/2021     Lab Results   Component Value Date    CHOLHDL 21.7 02/04/2022    CHOLHDL 30.9 09/30/2021    CHOLHDL 30.0 07/02/2021      CMP  Glucose   Date Value Ref Range Status   02/04/2022 113 (H) 70 - 110 mg/dL Final     BUN   Date Value Ref Range Status   02/04/2022 17 6 - 20 mg/dL Final     Creatinine   Date Value Ref Range Status   02/04/2022 0.7 0.5 - 1.4 mg/dL Final     eGFR if    Date Value Ref Range Status   02/04/2022 >60.0 >60 mL/min/1.73 m^2 Final     eGFR if non    Date Value Ref Range Status   02/04/2022 >60.0 >60 mL/min/1.73 m^2 Final     Comment:     Calculation used to obtain the estimated glomerular filtration  rate (eGFR) is the CKD-EPI equation.        AST   Date Value Ref Range Status   02/04/2022 13 10 - 40 U/L Final     ALT   Date Value Ref Range Status   02/04/2022 17 10 - 44 U/L Final     Microalbumin creatinine ratio:   Lab Results   Component Value Date    MICALBCREAT 3.9 " 09/30/2021       Review Of Systems:   Gen: Appetite good, 5 lbs weight gain, denies fatigue and weakness. Denies polydipsia.  Skin: Skin is intact and heals well, denies any rashes or hair changes.   EENT: Denies any acute visual disturbances, nor blurred vision.    Resp: Denies SOB or Dyspnea on exertion, denies cough.   Cardiac: Denies chest pain, palpitations, or swelling.   GI: Denies abdominal pain, nausea or vomiting, diarrhea, or constipation.   /GYN: Denies nocturia, nor burning, frequency or pain on urination.  MS/Neuro: + numbness/ tingling in BLE; some chronic pain in lower back and legs, mostly sciatica down right leg.   Gait steady, speech clear  Psych: Denies drug/ETOH abuse, + hx of depression - is on zoloft. Had dose increased recently.   Other systems: negative.    Physical Exam:   GENERAL: Well developed, well nourished in appearance.   PSYCH: AAOx3, appropriate mood and affect, pleasant expression, conversant, appears relaxed, well groomed.   EYES: PERRL, Conjunctiva and corneas clear  NECK: Soft and Supple, trachea midline  CHEST: Even, regular, and unlabored respirations,  ABDOMEN: Soft, non-tender, non-distended.    VASCULAR: pedal pulses palpable bilaterally, no edema.  NEURO:  cranial nerves II - XII intact   MUSCULOSKELETAL: Good ROM, steady gait.   SKIN: Skin warm, dry, and intact     Assessment and Plan of Care:     Rayne was seen today for diabetes and follow-up.    Diagnoses and all orders for this visit:    Type 2 diabetes mellitus with hyperglycemia, with long-term current use of insulin  -     Discontinue: flash glucose sensor (FREESTYLE ANA 14 DAY SENSOR) Kit; Change sensor every 14 days. Apply topically to skin as directed.  -     flash glucose sensor (FREESTYLE ANA 14 DAY SENSOR) Kit; Change sensor every 14 days. Apply topically to skin as directed.  -     insulin lispro (HUMALOG U-100 INSULIN) 100 unit/mL injection; Inject 80 Units into the skin continuous. Gives up to  80units daily via Omnipod insulin pump. Do not directly inject. Only use within insulin pump    Other orders  -     atorvastatin (LIPITOR) 80 MG tablet; Take 1 tablet (80 mg total) by mouth every evening.  -     valsartan (DIOVAN) 320 MG tablet; Take 1 tablet (320 mg total) by mouth once daily.  -     gabapentin (NEURONTIN) 300 MG capsule; Take 1 capsule (300 mg total) by mouth 3 (three) times daily.         1. T2DM with hyperglycemia- Hgba1c goal is 7.5% or less without hypoglycemia - 8.4%--- > 14%--> 13.7%.---> 7.9% --> 7.1% --> 6.9%--> 6.4% current.   discussed DM, progression of disease, long term complications, CV risk factors and tx options.     Continue metformin 1000 bid.   Continue omnipod - decreased basal rate from 0.7 units/hour to 0.6 units/hour.   Remember to bolus before meals.   (formerly on vgo patch).   Continue Trulicity 3mg every week.   No known history of pancreatitis or medullary thyroid cancer.   If you experience severe abdominal pain, nausea, or diarrhea - please call me or notify my office immediately.   Would like to try sglt2i - had tried to prescribe, but she couldn't afford additional cost.   Prefers to keep same regimen at present.   Advise compliance with ADA diet and encourage exercise- gave list.   Reviewed  hypoglycemia, s/s and appropriate tx. Have/get quick acting glucose tablets at hand.   Instructed to monitor Blood glucose 2 - 4x/day and bring meter/ log to every clinic visit.   Continue on free style sami CGM  The patient is on 4 injections of insulin per day.   She is checking her sugars 4 times per day.   A CGM is MEDICALLY NECESSARY as it will allow me to virtually and more closely monitor glucose readings  This enables me to make any necessary adjustments to the patients diabetes regimen while keeping the patient and staff safe during the COVID-19 PHE.    2. HTN controlled for most part - continue meds as previously prescribed and monitor.   diovan 320mg tablet daily.  norvasc 2.5 mg daily - refilled.   Urine mac negative.     3. HLD- LDL goal < 100. at goal.   Currently on statin therapy- high dose - 80mg every HS. Should continue.   Will consider repatha in future if needed.    Refilled rx.     4. Weight - BMI Body mass index is 33.19 kg/m².   Encourage Ada diet and exercise.   Continue glp1.     5. Renal Function - stable. Will monitor trends.     6. MNG - history of MNG - no compressive symptoms.   Last thyroid Ultrasound was in 2015 and FNA negative.   Would recommend repeat for surveillance. Will discuss at follow up visit.  Repeat tft's on next lab draw in 2 weeks.   tsh was slightly high - 4.5 - if still elevated can treat with synthroid 50 daily - adult onset hypothyroidism.     7. S/p Right MCA stroke - on plavix and aspirin and high dose statin now.   Discussed CV risk factors.      8. Lumbar stenosis - has been recommended spine surgery. Doesn't want surgery for now - concerned as her family members had bad experiences.   I recommended possible spinal injections - placed consult for pain management. She is going. Now in physical therapy.   Still having sciatica down her right leg despite nsaids and therapy.         OV and labs in 3 - 4 months.

## 2022-02-08 NOTE — PATIENT INSTRUCTIONS
"Continue metformin 1000mg twice per day.   Continue trulicity 3 mg every week.   Continue on omnipod - give boluses before meals or snacks.   I have decreased your rate slightly from 0.7 units to 0.6 units/hour.     For low blood sugar - remember to keep glucose tablets on hand. You can purchase these at the pharmacy check out desk/over the counter.   If blood sugar is less than 70, take/eat 2 - 3 tablets quickly to bring your sugar up.   Always keep 15 grams of Quick acting carbohydrates on hand to eat/drink if your sugar is low - examples are 1/2 cup of juice, coke, or crackers, granola bars.   Remember to eat meals frequently to prevent low blood blood sugars.     Low Carb Snacks & Diabetes friendly foods   Aim for 30 - 40 grams of carbs for 3 meals per day (or 2 meals and  1 - 2 snacks - however you prefer)  Snacks can be 15 - 20 grams of carbs.     Eating small, frequent meals will help you to feel more satisfied, and more full so that you don't over eat or eat the wrong foods later.   Also, bekci meals/snacks allow you to spread carbohydrates evenly, which may stabilize blood sugars.   Below you will find a list of ideas of foods that I like/prefer.   Feel free to give me your ideas and tips if you find good ones too!   Overall - please remember to limit refined sugars such as soft drinks, juices, rices, pastas, breads, cakes.   Look at the back of the label - look at the amount of "carbohydrates", then look at the amount of "fiber" - subtract the amount of fiber from the amount of carbs - this is your "net carb intake".  The more fiber a food has, the better generally, as you get to subtract this from the net carbs.     0-5 gm carb   Crystal Light flavoring (I like fruit punch flavor!)   Vitamin Water Zero   Sabina antioxidant drinks.    Sparkling ice (long skinny flavored water bottles for $1 that are sugar free).    Rosangela water, WaterLoo - carbonated flavored arredondo, various flavors.   Diet comanuel, diet " "barqs, sprite zero.   Diet sunkist (orange drink)   Sugar free powerade   Herbal tea, unsweetened   2 tsp peanut butter on celery or carrots   Bryan's original Candies - (the Sugar Free ones!)   1/2 cup sugar-free jell-o   1 sugar-free popsicle   ¼ cup blueberries or strawberries   8oz Blue Debbie unsweetened almond milk (or there is sugar free vanilla flavored as well).   5 baby carrots & celery sticks, cucumbers, bell peppers dipped in ¼ cup salsa, 2Tbsp light ranch dressing or 2Tbsp plain Greek yogurt   10 Goldfish crackers   ½ oz low-fat cheese or string cheese   1 closed handful of nuts, unsalted (example-->almonds, pistachios, cashews, peanuts, etc).   1 Tbsp of sunflower seeds, unsalted   1 cup Smart Pop popcorn   1 whole grain brown rice cake    "Think Thin" protein bars - my personal favorite is Creamy Peanut butter, chocolate brownie, or oreo flavors.   "Pyle" bars  - can be found at [employer] grocery store - they have 5 grams of net carbohydrates.   Quest bars (my favorite is birthday cake, and also cinnamon roll is good melted for 10 seconds in the microwave - please remove the wrapper first!)   Premier protein shakes - sold at [employer], or other brand alternatives - usually 1 - 2 grams of carbs (strawberry, vanilla, chocolate flavors) Coffee flavor is my new morning favorite!    Scrambled eggs! Or a fried egg or boiled eggs - add sliced tomatoes, cilantro or some chopped green onions.    Eggs are good with any and all veggies! You can even eat them for dinner or in a low carb tortilla, or served with your favorite grilled meat/sausage or love is my favorite.    Check out pre-made egg scrambles in the egg grocery store section - Ore Dianna "just crack an egg" is premixed cheese, love and small amount of pototes, small cup size portions for your breakfast - very convenient!    Sneha rossi - zucchini - can buy in the frozen foods section. Birds eye brand is usually cheap. Tip " "- saute with oil/onions or italian seasoning and use this as a pasta substitution.   Milk - is usually high in carbs/sugar - use [employer] milk brand instead - it is "filtered" milk - with half the amount of carbs of regular milk. (next to the milk in milk aisle).    Smuckers sugar free Breakfast syrup (or 1 tablespoon of low sugar breakfast syrup) instead of regular syrup.        15 gm carb   1 small piece of fruit or ½ banana or 1/2 cup lite canned fruit   3 chris cracker squares   3 cups Smart Pop popcorn, top spray butter, Atkinson lite salt or cinnamon and Truvia   5 Vanilla Wafers   ½ cup low fat, no added sugar ice cream or frozen yogurt (Blue bell, Blue Bunny, Weight Watchers, Skinny Cow)   1/2 - 1 cup Light n' fit Vanilla yogurt (has added protein in it to make you feel full).    ½ turkey, ham, or chicken sandwich   ½ c fruit with ½ c Cottage cheese   4-6 unsalted wheat crackers with 1 oz low fat cheese or 1 tbsp peanut butter    30-45 goldfish crackers (depending on flavor)    7-8 Muslim mini brown rice cakes (caramel, apple cinnamon, chocolate)    12 Muslim mini brown rice cakes (cheddar, bbq, ranch)    1/3 cup hummus dip with raw veg   1/2 whole wheat mihir, 1Tbsp hummus   Mini Pizza (1/2 whole wheat English muffin, low-fat  cheese, tomato sauce)   100 calorie snack pack (Oreo, Chips Ahoy, Ritz Mix, Baked Cheetos)   4-6 oz. light or Greek Style yogurt (Chobani, Yoplait, Okios, [place]oneyfield)   ½ cup sugar-free pudding     6 in. wheat tortilla or mihir oven toasted chips (topped with spray butter flavoring, cinnamon, Truvia OR spray butter, garlic powder, chili powder)    18 BBQ Popchips (available at [employer], Whole Foods, Fresh Market)   Mini bagel (small size) toasted - add fat free cream cheese or avocado and sliced tomato on top - yum!    1/2 cup Halo top icecream - birthday cake is my go-to flavor.    Truth Bars - can be found at [employer] market - Net carbs is 11 - 12 grams depending " "on the flavor.    Kind bars = mostly nuts and dried berries - find at most local groceries stores, drug stores, whole foods, fresh market. Net carbs is around 10 grams.     Smoothie Srini - I'm often asked "what smoothie is healthy for me". Get the 20 oz. Size.   Do not be decieved to think that all smoothies are "healthy". In fact, most are loaded with hidden sugars.   Here are some from the menu that you are allowed to have being diabetic:   The gladiator - any flavor. This is the lowest in carbs (3 - 5 grams only)  Keto champ (berry, chocolate or coffee - all have 14 - 19 grams of carbs in 20 oz size).   The Lean 1 smoothies - vanilla, strawberry and chocolate have under 30 grams of carbs, so this is slightly more. But would be ok for a meal substitute or snack.   The Shredder in Vanilla or chocolate only.  17 grams of carbs - (the strawberry has more sugar considerably)    Tips and Tricks:     Eat an extra vegetable once per day - green veggies (such as broccoli, cauliflower, green beans) - make you feel full and are low in sugar.     My favorite "secret" seasoning to make things extra yummy is cinnamon for sweet taste   For an added secret "salty" taste - add "everything but the bagel" seasoning (you can get on amazon.com or at  joes. I have seen at local groceries such as [employer] too!).     Dark colored fruits are your friend -- blueberries, strawberries, raspberries, blackberries. They have a lower sugar content. So will be the best choice for you.   Light colored fruits are NOT your friend - they tend to be higher in sugar and are not the best options for an ADA diet - examples are oranges, bananas, peaches, watermelon, -- you can eat these, but carefully and in moderation.     WATER. I cannot emphasize drinking water enough - it will hydrate you, make you full. Your body needs it - it's a natural appetite suppressant.   Sometimes hunger and thirst can feel the same. Try drinking some water first, then " "eat if you are still hungry.     Other snack choices    Celery with peanut butter   Celery with tuna salad   Dill pickles and cheddar cheese (no kidding, it's a great combo)   Nuts (keep raw ones in the freezer if you think you'll overeat them)   Sunflower seeds (get them in the shell so it will take longer to eat them)   Other seeds (How to Toast Pumpkin or Squash Seeds)    Pistachios or almonds - will fill you up and are tasty!    Low-Carb Trail Mix   Jerky (beef or turkey -- try to find low-sugar varieties)   Salami slices (you can find in the deli section)   Cheese sticks, such as string cheese   Sugar-free Jello, alone or with cottage cheese and a sprinkling of nuts. Make sugar-free lime Jello with part coconut milk -- For a large package, dissolve the powder in a cup of boiling water, add a can of coconut milk, and then add the rest of the water. Stir well.   Pepperoni "chips" -- Zap the slices in the microwave   Cheese with a few apple slices   4-ounce plain or sugar-free yogurt with berries and flax seed meal   Smoked salmon and cream cheese on cucumber slices   Lettuce Roll-ups -- Roll luncheon meat, egg salad, tuna or other filling and veggies in lettuce leaves   Lunch Meat Roll-ups -- Roll cheese or veggies in lunch meat (read the labels for carbs on the lunch meat)   Spread bean dip, spinach dip, or other low-carb dip or spread on the lunch meat or lettuce and then roll it up   Raw veggies and spinach dip, or other low-carb dip   Pork rinds (Chicharrón), with or without dip   Ricotta cheese with fruit and/or nuts and/or flax seed meal   Mushrooms with cheese spread inside (or other spreads or dips)   Low-carb snack bars (watch out for sugar alcohols, especially maltitol)   Product Review: Atkins Advantage Bars   Pepperoni Chips -- Microwave pepperoni slices until crisp. Great with cheeses and dips   Garlic Parmesan Flax Seed Crackers   Parmesan Crisps -- Good when you want a " crunchy snack.   Peanut Butter Protein Balls

## 2022-02-11 ENCOUNTER — CLINICAL SUPPORT (OUTPATIENT)
Dept: REHABILITATION | Facility: HOSPITAL | Age: 57
End: 2022-02-11
Payer: COMMERCIAL

## 2022-02-11 DIAGNOSIS — M54.41 CHRONIC BILATERAL LOW BACK PAIN WITH BILATERAL SCIATICA: ICD-10-CM

## 2022-02-11 DIAGNOSIS — G89.29 CHRONIC BILATERAL LOW BACK PAIN WITH BILATERAL SCIATICA: ICD-10-CM

## 2022-02-11 DIAGNOSIS — M54.42 CHRONIC BILATERAL LOW BACK PAIN WITH BILATERAL SCIATICA: ICD-10-CM

## 2022-02-11 PROCEDURE — 97110 THERAPEUTIC EXERCISES: CPT | Mod: CQ

## 2022-02-11 PROCEDURE — 97112 NEUROMUSCULAR REEDUCATION: CPT | Mod: CQ

## 2022-02-11 NOTE — PROGRESS NOTES
"  Physical Therapy Daily Treatment Note     Name: Rayne Aaron  Clinic Number: [id number]    Therapy Diagnosis:   Encounter Diagnosis   Name Primary?    Chronic bilateral low back pain with bilateral sciatica      Physician: Saud Mendez MD    Visit Date: 2/11/2022    Physician Orders: PT Eval and Treat   Medical Diagnosis from Referral:   M48.061 (ICD-10-CM) - Spinal stenosis of lumbar region, unspecified whether neurogenic claudication present   M47.26 (ICD-10-CM) - Osteoarthritis of spine with radiculopathy, lumbar region         Evaluation Date: 2/1/2022  Authorization Period Expiration: 1/18/2023  Plan of Care Expiration: 5/1/2022  Visit # / Visits authorized: 1/20      Time In: 0706  Time Out: 0800  Total Billable Time: 42 minutes    Precautions: Standard    Subjective     Pt reports: Pt returns to therapy this am with c/o continued lumbar, R thoracic, and n/t in BLE. The thoracic pain initiated within the last week which pt attributes to possibly sleeping wrong. Reports compliance with HEP since IE and thinks they're helping.     She was compliant with home exercise program.  Response to previous treatment: No adverse effects  Functional change: N/A    Pain: not verbalized/10  Location: lumbar region    Objective     Rayne received therapeutic exercises to develop strength, endurance, ROM and flexibility for 12 minutes including:    DKTC stretch 15 x 5"  SKTC stretch 10 x 5" theodore  Pt education: HEP exercises and frequency    Rayne received the following manual therapy techniques: Joint mobilizations were applied for 00 minutes including:      Rayne participated in neuromuscular re-education activities to improve Balance, Coordination, Proprioception, Posture and Core Stabilization for 30 minutes. The following activities were included:    Slump sliders 2 x 10 theodore  PPT 15 x 10"  Supine clam c PPT OTB 2 x 10 x 10"  Seated marching 2 x 10 theodore    Rayne participated in dynamic functional therapeutic activities to " improve functional performance for 00 minutes including:        Home Exercises Provided and Patient Education Provided     Education provided:   - Flexion based exercise in bed to improve symptoms in the morning    Written Home Exercises Provided: yes.  Exercises were reviewed and Rayne was able to demonstrate them prior to the end of the session.  Rayne demonstrated good  understanding of the education provided.     See EMR under Patient Instructions for exercises provided 2/1/22.    Assessment     Pt was able to complete all exercises including progressions with c/o min R lumbar pain during seated marching which required cueing to avoid lateral sway. Will assess response to today's tx and progress as tolerated. No adverse effects reported p tx.     Rayne Is progressing well towards her goals.   Pt prognosis is Fair.     Pt will continue to benefit from skilled outpatient physical therapy to address the deficits listed in the problem list box on initial evaluation, provide pt/family education and to maximize pt's level of independence in the home and community environment.     Pt's spiritual, cultural and educational needs considered and pt agreeable to plan of care and goals.     Anticipated barriers to physical therapy: covid-19    GOALS: Short Term Goals:  3 weeks  1.Report decreased low back pain  < / =  2/10  to increase tolerance for ambulation in the clinic   2. Increase ROM by 5 degrees where limited in order to perform ADLs without difficulty.  3. Increase strength by 1/3 MMT grade in gluts to increase tolerance for ADL and work activities.  4. Pt to tolerate HEP to improve ROM and independence with ADL's     Long Term Goals: 8 weeks  1.Report decreased low back pain < / = 0/10  to increase tolerance for return to work pain free  2.Patient goal: return to full day without low back pain   3.Increase strength to 4+/5 in  gluts  to increase tolerance for ADL and work activities.  4. Pt will report at goals   on FOTO  to demonstrate increase in LE function with every day tasks.    Plan   Plan of care Certification: 2/1/2022 to 5/1/2022.     Outpatient Physical Therapy 2 times weekly for 10 weeks to include the following interventions: Cervical/Lumbar Traction, Gait Training, Manual Therapy, Moist Heat/ Ice, Neuromuscular Re-ed, Patient Education, Self Care, Therapeutic Activities and Therapeutic Exercise.     Ttia Grant, PTA

## 2022-02-16 ENCOUNTER — CLINICAL SUPPORT (OUTPATIENT)
Dept: REHABILITATION | Facility: HOSPITAL | Age: 57
End: 2022-02-16
Payer: COMMERCIAL

## 2022-02-16 DIAGNOSIS — M54.42 CHRONIC BILATERAL LOW BACK PAIN WITH BILATERAL SCIATICA: ICD-10-CM

## 2022-02-16 DIAGNOSIS — M54.41 CHRONIC BILATERAL LOW BACK PAIN WITH BILATERAL SCIATICA: ICD-10-CM

## 2022-02-16 DIAGNOSIS — G89.29 CHRONIC BILATERAL LOW BACK PAIN WITH BILATERAL SCIATICA: ICD-10-CM

## 2022-02-16 PROCEDURE — 97112 NEUROMUSCULAR REEDUCATION: CPT

## 2022-02-16 PROCEDURE — 97110 THERAPEUTIC EXERCISES: CPT

## 2022-02-16 NOTE — PROGRESS NOTES
"    Physical Therapy Daily Treatment Note     Name: Rayne Aaron  Clinic Number: [id number]    Therapy Diagnosis:   Encounter Diagnosis   Name Primary?    Chronic bilateral low back pain with bilateral sciatica      Physician: Saud Mendze MD    Visit Date: 2/16/2022    Physician Orders: PT Eval and Treat   Medical Diagnosis from Referral:   M48.061 (ICD-10-CM) - Spinal stenosis of lumbar region, unspecified whether neurogenic claudication present   M47.26 (ICD-10-CM) - Osteoarthritis of spine with radiculopathy, lumbar region         Evaluation Date: 2/1/2022  Authorization Period Expiration: 1/18/2023  Plan of Care Expiration: 5/1/2022  Visit # / Visits authorized: 2/20      Time In: 6:59  Time Out: 7:54  Total Billable Time: 55 minutes    Precautions: Standard    Subjective     Pt reports: pt reported having some increased soreness from last session but no increase in pain. She reports her left ankle swelling went down and was able to put her shoe on without having increased pain. Feels like her pain is getting better.     She was compliant with home exercise program.  Response to previous treatment: No adverse effects  Functional change: N/A    Pain: not verbalized/10  Location: lumbar region    Objective     Rayne received therapeutic exercises to develop strength, endurance, ROM and flexibility for 25 minutes including:  Stationary bike 5' for cardiovascular endurance, strengthening, and rom  DKTC stretch 15 x 5"  SKTC stretch 10 x 5" theodore  Seated glute sets 20x5"  Pt education: HEP exercises and frequency    Rayne received the following manual therapy techniques: Joint mobilizations were applied for 00 minutes including:      Rayne participated in neuromuscular re-education activities to improve Balance, Coordination, Proprioception, Posture and Core Stabilization for 26 minutes. The following activities were included:    Slump sliders 2 x 10 theodore  PPT 15 x 10"  Supine clam c PPT OTB 2 x 10 x 10"  Seated " marching 2 x 10 theodore    Rayne participated in dynamic functional therapeutic activities to improve functional performance for 00 minutes including:        Home Exercises Provided and Patient Education Provided     Education provided:   - Flexion based exercise in bed to improve symptoms in the morning    Written Home Exercises Provided: yes.  Exercises were reviewed and Rayne was able to demonstrate them prior to the end of the session.  Rayne demonstrated good  understanding of the education provided.     See EMR under Patient Instructions for exercises provided 2/1/22.    Assessment     Pt did well with todays session and able to tolerate exercises. Discussed desk set up at work and talked about using a stool under her feet. Added stationary bike and glute sets. Feels improvement with range and decreased tingling in her legs from exercises. Will continue to progress as tolerated.      Rayne Is progressing well towards her goals.   Pt prognosis is Fair.     Pt will continue to benefit from skilled outpatient physical therapy to address the deficits listed in the problem list box on initial evaluation, provide pt/family education and to maximize pt's level of independence in the home and community environment.     Pt's spiritual, cultural and educational needs considered and pt agreeable to plan of care and goals.     Anticipated barriers to physical therapy: covid-19    GOALS: Short Term Goals:  3 weeks  1.Report decreased low back pain  < / =  2/10  to increase tolerance for ambulation in the clinic   2. Increase ROM by 5 degrees where limited in order to perform ADLs without difficulty.  3. Increase strength by 1/3 MMT grade in gluts to increase tolerance for ADL and work activities.  4. Pt to tolerate HEP to improve ROM and independence with ADL's     Long Term Goals: 8 weeks  1.Report decreased low back pain < / = 0/10  to increase tolerance for return to work pain free  2.Patient goal: return to full day  without low back pain   3.Increase strength to 4+/5 in  gluts  to increase tolerance for ADL and work activities.  4. Pt will report at goals  on FOTO  to demonstrate increase in LE function with every day tasks.    Plan   Plan of care Certification: 2/1/2022 to 5/1/2022.     Outpatient Physical Therapy 2 times weekly for 10 weeks to include the following interventions: Cervical/Lumbar Traction, Gait Training, Manual Therapy, Moist Heat/ Ice, Neuromuscular Re-ed, Patient Education, Self Care, Therapeutic Activities and Therapeutic Exercise.     Bartolo Lepe, PT

## 2022-02-17 VITALS
SYSTOLIC BLOOD PRESSURE: 148 MMHG | OXYGEN SATURATION: 98 % | DIASTOLIC BLOOD PRESSURE: 94 MMHG | HEART RATE: 90 BPM | RESPIRATION RATE: 20 BRPM

## 2022-02-20 ENCOUNTER — PATIENT MESSAGE (OUTPATIENT)
Dept: INTERNAL MEDICINE | Facility: CLINIC | Age: 57
End: 2022-02-20
Payer: COMMERCIAL

## 2022-02-21 ENCOUNTER — PATIENT MESSAGE (OUTPATIENT)
Dept: INTERNAL MEDICINE | Facility: CLINIC | Age: 57
End: 2022-02-21
Payer: COMMERCIAL

## 2022-02-21 NOTE — TELEPHONE ENCOUNTER
Info-Pt lost job and health benefits, as soon as she finds a job she will be seen for an appointment.

## 2022-02-23 ENCOUNTER — HOME CARE VISIT (OUTPATIENT)
Dept: NEUROLOGY | Facility: HOSPITAL | Age: 57
End: 2022-02-23
Payer: COMMERCIAL

## 2022-02-23 VITALS
RESPIRATION RATE: 20 BRPM | HEART RATE: 84 BPM | DIASTOLIC BLOOD PRESSURE: 88 MMHG | WEIGHT: 182 LBS | SYSTOLIC BLOOD PRESSURE: 142 MMHG | OXYGEN SATURATION: 98 % | BODY MASS INDEX: 32.24 KG/M2

## 2022-02-24 ENCOUNTER — TELEPHONE (OUTPATIENT)
Dept: PHARMACY | Facility: CLINIC | Age: 57
End: 2022-02-24
Payer: COMMERCIAL

## 2022-02-24 ENCOUNTER — PATIENT MESSAGE (OUTPATIENT)
Dept: PHARMACY | Facility: CLINIC | Age: 57
End: 2022-02-24
Payer: COMMERCIAL

## 2022-02-25 NOTE — TELEPHONE ENCOUNTER
----- Message from Amalia Ferrer sent at 2/21/2022  4:54 PM CST -----  Regarding: FW: needs trulicity - lost insurance    ----- Message -----  From: Patricia Agustin NP  Sent: 2/21/2022   9:42 AM CST  To: Pharmacy Patient Assistance Team  Subject: needs trulicity - lost insurance                 Patient was doing well with her diabetes care - she recently lost her job and now has no health insurance.   She is on trulicity 3mg every week - can you see if she can get patient assistance for this?   She no longer has commerical health insurance.   I think she may be trying to get medicaid - I don't know if she understands how to apply for it yet.   I hope you can help her with this.     Thank you,   Patricia

## 2022-03-21 ENCOUNTER — PATIENT MESSAGE (OUTPATIENT)
Dept: ADMINISTRATIVE | Facility: HOSPITAL | Age: 57
End: 2022-03-21
Payer: COMMERCIAL

## 2022-03-29 ENCOUNTER — HOME CARE VISIT (OUTPATIENT)
Dept: NEUROLOGY | Facility: HOSPITAL | Age: 57
End: 2022-03-29
Payer: COMMERCIAL

## 2022-03-29 VITALS
WEIGHT: 186 LBS | RESPIRATION RATE: 20 BRPM | DIASTOLIC BLOOD PRESSURE: 84 MMHG | SYSTOLIC BLOOD PRESSURE: 142 MMHG | HEART RATE: 76 BPM | BODY MASS INDEX: 32.95 KG/M2 | OXYGEN SATURATION: 99 %

## 2022-03-29 NOTE — PROGRESS NOTES
Patient doing well today, states being in a slump and looking for a new job. Slight depression at times but works through it. States  Neck pain that radiates to shoulders and takes acetaminophen for the pain. No complaints of headaches, dizziness or nausea.

## 2022-04-06 RX ORDER — AMLODIPINE BESYLATE 2.5 MG/1
2.5 TABLET ORAL DAILY
Qty: 30 TABLET | Refills: 11 | Status: SHIPPED | OUTPATIENT
Start: 2022-04-06 | End: 2023-03-08 | Stop reason: SDUPTHER

## 2022-04-14 ENCOUNTER — PATIENT MESSAGE (OUTPATIENT)
Dept: INTERNAL MEDICINE | Facility: CLINIC | Age: 57
End: 2022-04-14
Payer: COMMERCIAL

## 2022-04-22 NOTE — TELEPHONE ENCOUNTER
Calling patient to discuss insurance changes.   No answer. Left voicemail   Will also send portal message   Would like to confirm which insurance she is using so we can discuss options with Omnod rep

## 2022-04-23 ENCOUNTER — PATIENT MESSAGE (OUTPATIENT)
Dept: INTERNAL MEDICINE | Facility: CLINIC | Age: 57
End: 2022-04-23
Payer: COMMERCIAL

## 2022-04-23 DIAGNOSIS — E11.65 TYPE 2 DIABETES MELLITUS WITH HYPERGLYCEMIA, WITH LONG-TERM CURRENT USE OF INSULIN: Primary | ICD-10-CM

## 2022-04-23 DIAGNOSIS — Z79.4 TYPE 2 DIABETES MELLITUS WITH HYPERGLYCEMIA, WITH LONG-TERM CURRENT USE OF INSULIN: Primary | ICD-10-CM

## 2022-04-25 RX ORDER — SUB-Q INSULIN DEVICE, 40 UNIT
1 EACH MISCELLANEOUS DAILY
Qty: 90 EACH | Refills: 3 | Status: SHIPPED | OUTPATIENT
Start: 2022-04-25 | End: 2022-08-03

## 2022-06-01 ENCOUNTER — PATIENT MESSAGE (OUTPATIENT)
Dept: ADMINISTRATIVE | Facility: HOSPITAL | Age: 57
End: 2022-06-01
Payer: COMMERCIAL

## 2022-06-03 ENCOUNTER — PATIENT MESSAGE (OUTPATIENT)
Dept: INTERNAL MEDICINE | Facility: CLINIC | Age: 57
End: 2022-06-03
Payer: COMMERCIAL

## 2022-06-03 ENCOUNTER — TELEPHONE (OUTPATIENT)
Dept: INTERNAL MEDICINE | Facility: CLINIC | Age: 57
End: 2022-06-03
Payer: COMMERCIAL

## 2022-06-03 DIAGNOSIS — E11.65 TYPE 2 DIABETES MELLITUS WITH HYPERGLYCEMIA, WITH LONG-TERM CURRENT USE OF INSULIN: Primary | ICD-10-CM

## 2022-06-03 DIAGNOSIS — Z79.4 TYPE 2 DIABETES MELLITUS WITH HYPERGLYCEMIA, WITH LONG-TERM CURRENT USE OF INSULIN: Primary | ICD-10-CM

## 2022-06-03 RX ORDER — PEN NEEDLE, DIABETIC 30 GX3/16"
NEEDLE, DISPOSABLE MISCELLANEOUS
Qty: 90 EACH | Refills: 3 | Status: SHIPPED | OUTPATIENT
Start: 2022-06-03 | End: 2023-06-27

## 2022-06-03 RX ORDER — INSULIN PUMP CONTROLLER
1 EACH MISCELLANEOUS EVERY OTHER DAY
Qty: 15 EACH | Refills: 11 | Status: SHIPPED | OUTPATIENT
Start: 2022-06-03 | End: 2022-08-03

## 2022-06-03 RX ORDER — INSULIN DEGLUDEC 100 U/ML
30 INJECTION, SOLUTION SUBCUTANEOUS DAILY
Qty: 5 PEN | Refills: 1 | Status: SHIPPED | OUTPATIENT
Start: 2022-06-03 | End: 2022-10-03 | Stop reason: SDUPTHER

## 2022-06-03 NOTE — TELEPHONE ENCOUNTER
Pend this until next week - call patient to touch base with her.   She says on her new insurance plan VGO is not covered - she has a new [employer] plan.     In the meantime, she has no way to give herself humalog insuin continuously.   I sent in tresiba pens - for her to give 30 units once daily until we can get her back on continuous insulin.  vgo might be covered through [employer]... but before checking this, she was on omnipod and doing well on it -   I sent in omnipod dash pods to [facility] to check on coverage.   Can you send a prescription rx to [facility] for dash?   Her past insurance would not cover dash but hopeful this one does.     Advised patient to call me if any issues not being able to  her back up insulin.     Thanks,  kelly

## 2022-06-08 DIAGNOSIS — Z12.31 OTHER SCREENING MAMMOGRAM: ICD-10-CM

## 2022-06-21 ENCOUNTER — PATIENT MESSAGE (OUTPATIENT)
Dept: INTERNAL MEDICINE | Facility: CLINIC | Age: 57
End: 2022-06-21
Payer: COMMERCIAL

## 2022-06-21 ENCOUNTER — OFFICE VISIT (OUTPATIENT)
Dept: INTERNAL MEDICINE | Facility: CLINIC | Age: 57
End: 2022-06-21
Payer: COMMERCIAL

## 2022-06-21 VITALS
HEART RATE: 84 BPM | SYSTOLIC BLOOD PRESSURE: 125 MMHG | HEIGHT: 63 IN | DIASTOLIC BLOOD PRESSURE: 82 MMHG | BODY MASS INDEX: 33.48 KG/M2 | TEMPERATURE: 98 F | OXYGEN SATURATION: 98 % | WEIGHT: 188.94 LBS

## 2022-06-21 DIAGNOSIS — J69.0 ASPIRATION PNEUMONIA OF RIGHT LOWER LOBE DUE TO GASTRIC SECRETIONS: Primary | ICD-10-CM

## 2022-06-21 DIAGNOSIS — K21.9 GASTROESOPHAGEAL REFLUX DISEASE, UNSPECIFIED WHETHER ESOPHAGITIS PRESENT: ICD-10-CM

## 2022-06-21 PROCEDURE — 3044F PR MOST RECENT HEMOGLOBIN A1C LEVEL <7.0%: ICD-10-PCS | Mod: CPTII,S$GLB,, | Performed by: FAMILY MEDICINE

## 2022-06-21 PROCEDURE — 99999 PR PBB SHADOW E&M-EST. PATIENT-LVL V: CPT | Mod: PBBFAC,,, | Performed by: FAMILY MEDICINE

## 2022-06-21 PROCEDURE — 3079F DIAST BP 80-89 MM HG: CPT | Mod: CPTII,S$GLB,, | Performed by: FAMILY MEDICINE

## 2022-06-21 PROCEDURE — 3066F NEPHROPATHY DOC TX: CPT | Mod: CPTII,S$GLB,, | Performed by: FAMILY MEDICINE

## 2022-06-21 PROCEDURE — 1160F PR REVIEW ALL MEDS BY PRESCRIBER/CLIN PHARMACIST DOCUMENTED: ICD-10-PCS | Mod: CPTII,S$GLB,, | Performed by: FAMILY MEDICINE

## 2022-06-21 PROCEDURE — 3074F SYST BP LT 130 MM HG: CPT | Mod: CPTII,S$GLB,, | Performed by: FAMILY MEDICINE

## 2022-06-21 PROCEDURE — 1160F RVW MEDS BY RX/DR IN RCRD: CPT | Mod: CPTII,S$GLB,, | Performed by: FAMILY MEDICINE

## 2022-06-21 PROCEDURE — 3061F NEG MICROALBUMINURIA REV: CPT | Mod: CPTII,S$GLB,, | Performed by: FAMILY MEDICINE

## 2022-06-21 PROCEDURE — 99214 OFFICE O/P EST MOD 30 MIN: CPT | Mod: S$GLB,,, | Performed by: FAMILY MEDICINE

## 2022-06-21 PROCEDURE — 3079F PR MOST RECENT DIASTOLIC BLOOD PRESSURE 80-89 MM HG: ICD-10-PCS | Mod: CPTII,S$GLB,, | Performed by: FAMILY MEDICINE

## 2022-06-21 PROCEDURE — 1159F MED LIST DOCD IN RCRD: CPT | Mod: CPTII,S$GLB,, | Performed by: FAMILY MEDICINE

## 2022-06-21 PROCEDURE — 99214 PR OFFICE/OUTPT VISIT, EST, LEVL IV, 30-39 MIN: ICD-10-PCS | Mod: S$GLB,,, | Performed by: FAMILY MEDICINE

## 2022-06-21 PROCEDURE — 3044F HG A1C LEVEL LT 7.0%: CPT | Mod: CPTII,S$GLB,, | Performed by: FAMILY MEDICINE

## 2022-06-21 PROCEDURE — 3061F PR NEG MICROALBUMINURIA RESULT DOCUMENTED/REVIEW: ICD-10-PCS | Mod: CPTII,S$GLB,, | Performed by: FAMILY MEDICINE

## 2022-06-21 PROCEDURE — 3008F BODY MASS INDEX DOCD: CPT | Mod: CPTII,S$GLB,, | Performed by: FAMILY MEDICINE

## 2022-06-21 PROCEDURE — 3066F PR DOCUMENTATION OF TREATMENT FOR NEPHROPATHY: ICD-10-PCS | Mod: CPTII,S$GLB,, | Performed by: FAMILY MEDICINE

## 2022-06-21 PROCEDURE — 3074F PR MOST RECENT SYSTOLIC BLOOD PRESSURE < 130 MM HG: ICD-10-PCS | Mod: CPTII,S$GLB,, | Performed by: FAMILY MEDICINE

## 2022-06-21 PROCEDURE — 99999 PR PBB SHADOW E&M-EST. PATIENT-LVL V: ICD-10-PCS | Mod: PBBFAC,,, | Performed by: FAMILY MEDICINE

## 2022-06-21 PROCEDURE — 1159F PR MEDICATION LIST DOCUMENTED IN MEDICAL RECORD: ICD-10-PCS | Mod: CPTII,S$GLB,, | Performed by: FAMILY MEDICINE

## 2022-06-21 PROCEDURE — 4010F ACE/ARB THERAPY RXD/TAKEN: CPT | Mod: CPTII,S$GLB,, | Performed by: FAMILY MEDICINE

## 2022-06-21 PROCEDURE — 3008F PR BODY MASS INDEX (BMI) DOCUMENTED: ICD-10-PCS | Mod: CPTII,S$GLB,, | Performed by: FAMILY MEDICINE

## 2022-06-21 PROCEDURE — 4010F PR ACE/ARB THEARPY RXD/TAKEN: ICD-10-PCS | Mod: CPTII,S$GLB,, | Performed by: FAMILY MEDICINE

## 2022-06-21 RX ORDER — FLASH GLUCOSE SENSOR
KIT MISCELLANEOUS
COMMUNITY
Start: 2022-06-11 | End: 2023-02-24

## 2022-06-21 RX ORDER — BENZONATATE 200 MG/1
200 CAPSULE ORAL 3 TIMES DAILY PRN
Qty: 30 CAPSULE | Refills: 0 | Status: SHIPPED | OUTPATIENT
Start: 2022-06-21 | End: 2022-07-01

## 2022-06-21 RX ORDER — PANTOPRAZOLE SODIUM 40 MG/1
40 TABLET, DELAYED RELEASE ORAL DAILY
Qty: 30 TABLET | Refills: 3 | Status: SHIPPED | OUTPATIENT
Start: 2022-06-21 | End: 2023-03-08

## 2022-06-21 RX ORDER — DOXYCYCLINE HYCLATE 100 MG
100 TABLET ORAL 2 TIMES DAILY
Qty: 20 TABLET | Refills: 0 | Status: SHIPPED | OUTPATIENT
Start: 2022-06-21 | End: 2022-07-01

## 2022-06-21 NOTE — PROGRESS NOTES
Subjective:       Patient ID: Rayne Aaron is a 57 y.o. female.    Chief Complaint: Gastroesophageal Reflux, Emesis, Otalgia, and Cough (Dry / off and on / negative at home covid test after  showing symptoms sundays)    HPI 57-year-old white female patient of Dr. Ware presents to clinic today secondary to concerns of worsening acid reflux, cough, sore throat, ear pain, and headaches since Ash evening.  She reports that Sunday evening she awoke secondary to severe acid reflux with approximately 5-6 episodes of vomiting.  The following day she noticed right ear pain and a sore throat.  She has noted occasional cough that is worse when she lays down.  She has taken Tylenol Tums with minimal relief  Review of Systems   Constitutional: Positive for appetite change. Negative for chills, fatigue and fever.   HENT: Positive for ear pain (right ) and sore throat. Negative for nasal congestion, hearing loss, postnasal drip, rhinorrhea, sinus pressure/congestion and tinnitus.    Eyes: Positive for discharge. Negative for redness, itching and visual disturbance.   Respiratory: Positive for cough. Negative for chest tightness and shortness of breath.    Cardiovascular: Negative for chest pain and palpitations.   Gastrointestinal: Positive for vomiting (5-6) and reflux. Negative for abdominal pain, constipation, diarrhea and nausea.   Genitourinary: Negative for decreased urine volume, difficulty urinating, dysuria, frequency, hematuria and urgency.   Musculoskeletal: Negative for back pain, myalgias, neck pain and neck stiffness.   Integumentary:  Negative for rash.   Neurological: Positive for headaches. Negative for dizziness and light-headedness.   Psychiatric/Behavioral: Negative.          Objective:      Physical Exam  Vitals and nursing note reviewed.   Constitutional:       General: She is not in acute distress.     Appearance: She is well-developed. She is not diaphoretic.   HENT:      Head: Normocephalic and  atraumatic.      Right Ear: Hearing, tympanic membrane, ear canal and external ear normal.      Left Ear: Hearing, tympanic membrane, ear canal and external ear normal.      Nose: Nose normal.      Right Turbinates: Swollen.      Left Turbinates: Swollen.      Mouth/Throat:      Pharynx: No oropharyngeal exudate.   Eyes:      General: No scleral icterus.        Right eye: No discharge.         Left eye: No discharge.      Conjunctiva/sclera: Conjunctivae normal.      Pupils: Pupils are equal, round, and reactive to light.   Neck:      Thyroid: No thyromegaly.      Vascular: No JVD.      Trachea: No tracheal deviation.   Cardiovascular:      Rate and Rhythm: Normal rate and regular rhythm.      Heart sounds: Normal heart sounds. No murmur heard.    No friction rub. No gallop.   Pulmonary:      Effort: Pulmonary effort is normal. No respiratory distress.      Breath sounds: No stridor. Examination of the right-lower field reveals rales. Rales present. No wheezing.   Abdominal:      General: Bowel sounds are normal. There is no distension.      Palpations: Abdomen is soft. There is no mass.      Tenderness: There is no abdominal tenderness. There is no guarding or rebound.   Musculoskeletal:         General: No tenderness. Normal range of motion.      Cervical back: Normal range of motion and neck supple.   Lymphadenopathy:      Cervical: No cervical adenopathy.   Skin:     General: Skin is warm and dry.      Coloration: Skin is not pale.      Findings: No erythema or rash.   Neurological:      Mental Status: She is alert and oriented to person, place, and time.   Psychiatric:         Behavior: Behavior normal.         Thought Content: Thought content normal.         Judgment: Judgment normal.         Assessment:       Problem List Items Addressed This Visit    None     Visit Diagnoses     Aspiration pneumonia of right lower lobe due to gastric secretions    -  Primary    Relevant Medications    doxycycline  (VIBRA-TABS) 100 MG tablet    benzonatate (TESSALON) 200 MG capsule    Gastroesophageal reflux disease, unspecified whether esophagitis present        Relevant Medications    pantoprazole (PROTONIX) 40 MG tablet          Plan:       Aspiration pneumonia of right lower lobe due to gastric secretions  -     doxycycline (VIBRA-TABS) 100 MG tablet; Take 1 tablet (100 mg total) by mouth 2 (two) times daily. for 10 days  Dispense: 20 tablet; Refill: 0  -     benzonatate (TESSALON) 200 MG capsule; Take 1 capsule (200 mg total) by mouth 3 (three) times daily as needed for Cough.  Dispense: 30 capsule; Refill: 0   Flonase nasal spray 2 sprays per nostril daily and over-the-counter Claritin nightly.   Saltwater or Listerine gargle as needed for sore throat.   Tylenol and ibuprofen as needed for fever or pain.    Gastroesophageal reflux disease, unspecified whether esophagitis present  -     pantoprazole (PROTONIX) 40 MG tablet; Take 1 tablet (40 mg total) by mouth once daily.  Dispense: 30 tablet; Refill: 3      Return to clinic as needed if symptoms persist or worsen.

## 2022-06-27 ENCOUNTER — HOME CARE VISIT (OUTPATIENT)
Dept: NEUROLOGY | Facility: HOSPITAL | Age: 57
End: 2022-06-27
Payer: COMMERCIAL

## 2022-07-05 ENCOUNTER — TELEPHONE (OUTPATIENT)
Dept: INTERNAL MEDICINE | Facility: CLINIC | Age: 57
End: 2022-07-05
Payer: COMMERCIAL

## 2022-07-07 ENCOUNTER — PATIENT OUTREACH (OUTPATIENT)
Dept: ADMINISTRATIVE | Facility: HOSPITAL | Age: 57
End: 2022-07-07
Payer: COMMERCIAL

## 2022-07-07 ENCOUNTER — PATIENT MESSAGE (OUTPATIENT)
Dept: ADMINISTRATIVE | Facility: HOSPITAL | Age: 57
End: 2022-07-07
Payer: COMMERCIAL

## 2022-07-07 DIAGNOSIS — Z12.11 COLON CANCER SCREENING: Primary | ICD-10-CM

## 2022-07-07 NOTE — PROGRESS NOTES
Chart reviewed  Immunizations reconciled   [employer] was given to patient on 7/7/2022 1:10 PM       Portal message sent

## 2022-07-12 ENCOUNTER — PATIENT MESSAGE (OUTPATIENT)
Dept: ADMINISTRATIVE | Facility: HOSPITAL | Age: 57
End: 2022-07-12
Payer: COMMERCIAL

## 2022-08-02 ENCOUNTER — HOSPITAL ENCOUNTER (OUTPATIENT)
Dept: RADIOLOGY | Facility: HOSPITAL | Age: 57
Discharge: HOME | End: 2022-08-02
Attending: HOSPITALIST
Payer: COMMERCIAL

## 2022-08-02 VITALS — BODY MASS INDEX: 33.66 KG/M2 | HEIGHT: 63 IN | WEIGHT: 190 LBS

## 2022-08-02 DIAGNOSIS — Z12.31 OTHER SCREENING MAMMOGRAM: ICD-10-CM

## 2022-08-02 PROCEDURE — 77063 MAMMO DIGITAL SCREENING BILAT WITH TOMO: ICD-10-PCS | Mod: 26,,, | Performed by: RADIOLOGY

## 2022-08-02 PROCEDURE — 77067 SCR MAMMO BI INCL CAD: CPT | Mod: TC,PO

## 2022-08-02 PROCEDURE — 77067 SCR MAMMO BI INCL CAD: CPT | Mod: 26,,, | Performed by: RADIOLOGY

## 2022-08-02 PROCEDURE — 77067 MAMMO DIGITAL SCREENING BILAT WITH TOMO: ICD-10-PCS | Mod: 26,,, | Performed by: RADIOLOGY

## 2022-08-02 PROCEDURE — 77063 BREAST TOMOSYNTHESIS BI: CPT | Mod: 26,,, | Performed by: RADIOLOGY

## 2022-08-03 ENCOUNTER — OFFICE VISIT (OUTPATIENT)
Dept: INTERNAL MEDICINE | Facility: CLINIC | Age: 57
End: 2022-08-03
Payer: COMMERCIAL

## 2022-08-03 ENCOUNTER — LAB VISIT (OUTPATIENT)
Dept: LAB | Facility: HOSPITAL | Age: 57
End: 2022-08-03
Payer: COMMERCIAL

## 2022-08-03 VITALS
HEART RATE: 86 BPM | OXYGEN SATURATION: 97 % | WEIGHT: 191.81 LBS | RESPIRATION RATE: 14 BRPM | BODY MASS INDEX: 33.98 KG/M2 | TEMPERATURE: 97 F | DIASTOLIC BLOOD PRESSURE: 80 MMHG | SYSTOLIC BLOOD PRESSURE: 128 MMHG | HEIGHT: 63 IN

## 2022-08-03 DIAGNOSIS — Z79.4 TYPE 2 DIABETES MELLITUS WITH HYPERGLYCEMIA, WITH LONG-TERM CURRENT USE OF INSULIN: Primary | ICD-10-CM

## 2022-08-03 DIAGNOSIS — E11.69 HYPERLIPIDEMIA ASSOCIATED WITH TYPE 2 DIABETES MELLITUS: ICD-10-CM

## 2022-08-03 DIAGNOSIS — E11.65 TYPE 2 DIABETES MELLITUS WITH HYPERGLYCEMIA, WITH LONG-TERM CURRENT USE OF INSULIN: Primary | ICD-10-CM

## 2022-08-03 DIAGNOSIS — Z78.9 IMPAIRED INSTRUMENTAL ACTIVITIES OF DAILY LIVING (IADL): ICD-10-CM

## 2022-08-03 DIAGNOSIS — E66.01 SEVERE OBESITY: ICD-10-CM

## 2022-08-03 DIAGNOSIS — E11.59 HYPERTENSION ASSOCIATED WITH DIABETES: ICD-10-CM

## 2022-08-03 DIAGNOSIS — E78.5 HYPERLIPIDEMIA ASSOCIATED WITH TYPE 2 DIABETES MELLITUS: ICD-10-CM

## 2022-08-03 DIAGNOSIS — I15.2 HYPERTENSION ASSOCIATED WITH DIABETES: ICD-10-CM

## 2022-08-03 DIAGNOSIS — Z79.4 TYPE 2 DIABETES MELLITUS WITH HYPERGLYCEMIA, WITH LONG-TERM CURRENT USE OF INSULIN: ICD-10-CM

## 2022-08-03 DIAGNOSIS — E11.65 TYPE 2 DIABETES MELLITUS WITH HYPERGLYCEMIA, WITH LONG-TERM CURRENT USE OF INSULIN: ICD-10-CM

## 2022-08-03 DIAGNOSIS — I63.411 EMBOLIC STROKE INVOLVING RIGHT MIDDLE CEREBRAL ARTERY: ICD-10-CM

## 2022-08-03 LAB
ALBUMIN SERPL BCP-MCNC: 3.8 G/DL (ref 3.5–5.2)
ALP SERPL-CCNC: 96 U/L (ref 55–135)
ALT SERPL W/O P-5'-P-CCNC: 13 U/L (ref 10–44)
ANION GAP SERPL CALC-SCNC: 11 MMOL/L (ref 8–16)
AST SERPL-CCNC: 13 U/L (ref 10–40)
BILIRUB SERPL-MCNC: 0.2 MG/DL (ref 0.1–1)
BUN SERPL-MCNC: 15 MG/DL (ref 6–20)
CALCIUM SERPL-MCNC: 10.2 MG/DL (ref 8.7–10.5)
CHLORIDE SERPL-SCNC: 102 MMOL/L (ref 95–110)
CO2 SERPL-SCNC: 28 MMOL/L (ref 23–29)
CREAT SERPL-MCNC: 0.9 MG/DL (ref 0.5–1.4)
EST. GFR  (NO RACE VARIABLE): >60 ML/MIN/1.73 M^2
ESTIMATED AVG GLUCOSE: 169 MG/DL (ref 68–131)
GLUCOSE SERPL-MCNC: 128 MG/DL (ref 70–110)
HBA1C MFR BLD: 7.5 % (ref 4–5.6)
POTASSIUM SERPL-SCNC: 4.9 MMOL/L (ref 3.5–5.1)
PROT SERPL-MCNC: 7.4 G/DL (ref 6–8.4)
SODIUM SERPL-SCNC: 141 MMOL/L (ref 136–145)

## 2022-08-03 PROCEDURE — 3008F BODY MASS INDEX DOCD: CPT | Mod: CPTII,S$GLB,, | Performed by: NURSE PRACTITIONER

## 2022-08-03 PROCEDURE — 1159F MED LIST DOCD IN RCRD: CPT | Mod: CPTII,S$GLB,, | Performed by: NURSE PRACTITIONER

## 2022-08-03 PROCEDURE — 3066F PR DOCUMENTATION OF TREATMENT FOR NEPHROPATHY: ICD-10-PCS | Mod: CPTII,S$GLB,, | Performed by: NURSE PRACTITIONER

## 2022-08-03 PROCEDURE — 99999 PR PBB SHADOW E&M-EST. PATIENT-LVL V: CPT | Mod: PBBFAC,,, | Performed by: NURSE PRACTITIONER

## 2022-08-03 PROCEDURE — 95251 PR GLUCOSE MONITOR, 72 HOUR, PHYS INTERP: ICD-10-PCS | Mod: S$GLB,,, | Performed by: NURSE PRACTITIONER

## 2022-08-03 PROCEDURE — 4010F ACE/ARB THERAPY RXD/TAKEN: CPT | Mod: CPTII,S$GLB,, | Performed by: NURSE PRACTITIONER

## 2022-08-03 PROCEDURE — 3044F HG A1C LEVEL LT 7.0%: CPT | Mod: CPTII,S$GLB,, | Performed by: NURSE PRACTITIONER

## 2022-08-03 PROCEDURE — 1159F PR MEDICATION LIST DOCUMENTED IN MEDICAL RECORD: ICD-10-PCS | Mod: CPTII,S$GLB,, | Performed by: NURSE PRACTITIONER

## 2022-08-03 PROCEDURE — 3061F PR NEG MICROALBUMINURIA RESULT DOCUMENTED/REVIEW: ICD-10-PCS | Mod: CPTII,S$GLB,, | Performed by: NURSE PRACTITIONER

## 2022-08-03 PROCEDURE — 3079F PR MOST RECENT DIASTOLIC BLOOD PRESSURE 80-89 MM HG: ICD-10-PCS | Mod: CPTII,S$GLB,, | Performed by: NURSE PRACTITIONER

## 2022-08-03 PROCEDURE — 3079F DIAST BP 80-89 MM HG: CPT | Mod: CPTII,S$GLB,, | Performed by: NURSE PRACTITIONER

## 2022-08-03 PROCEDURE — 4010F PR ACE/ARB THEARPY RXD/TAKEN: ICD-10-PCS | Mod: CPTII,S$GLB,, | Performed by: NURSE PRACTITIONER

## 2022-08-03 PROCEDURE — 99214 PR OFFICE/OUTPT VISIT, EST, LEVL IV, 30-39 MIN: ICD-10-PCS | Mod: S$GLB,,, | Performed by: NURSE PRACTITIONER

## 2022-08-03 PROCEDURE — 3066F NEPHROPATHY DOC TX: CPT | Mod: CPTII,S$GLB,, | Performed by: NURSE PRACTITIONER

## 2022-08-03 PROCEDURE — 83036 HEMOGLOBIN GLYCOSYLATED A1C: CPT | Performed by: NURSE PRACTITIONER

## 2022-08-03 PROCEDURE — 3074F SYST BP LT 130 MM HG: CPT | Mod: CPTII,S$GLB,, | Performed by: NURSE PRACTITIONER

## 2022-08-03 PROCEDURE — 3061F NEG MICROALBUMINURIA REV: CPT | Mod: CPTII,S$GLB,, | Performed by: NURSE PRACTITIONER

## 2022-08-03 PROCEDURE — 3008F PR BODY MASS INDEX (BMI) DOCUMENTED: ICD-10-PCS | Mod: CPTII,S$GLB,, | Performed by: NURSE PRACTITIONER

## 2022-08-03 PROCEDURE — 3074F PR MOST RECENT SYSTOLIC BLOOD PRESSURE < 130 MM HG: ICD-10-PCS | Mod: CPTII,S$GLB,, | Performed by: NURSE PRACTITIONER

## 2022-08-03 PROCEDURE — 99214 OFFICE O/P EST MOD 30 MIN: CPT | Mod: S$GLB,,, | Performed by: NURSE PRACTITIONER

## 2022-08-03 PROCEDURE — 80053 COMPREHEN METABOLIC PANEL: CPT | Performed by: NURSE PRACTITIONER

## 2022-08-03 PROCEDURE — 95251 CONT GLUC MNTR ANALYSIS I&R: CPT | Mod: S$GLB,,, | Performed by: NURSE PRACTITIONER

## 2022-08-03 PROCEDURE — 99999 PR PBB SHADOW E&M-EST. PATIENT-LVL V: ICD-10-PCS | Mod: PBBFAC,,, | Performed by: NURSE PRACTITIONER

## 2022-08-03 PROCEDURE — 36415 COLL VENOUS BLD VENIPUNCTURE: CPT | Mod: PO | Performed by: NURSE PRACTITIONER

## 2022-08-03 PROCEDURE — 3044F PR MOST RECENT HEMOGLOBIN A1C LEVEL <7.0%: ICD-10-PCS | Mod: CPTII,S$GLB,, | Performed by: NURSE PRACTITIONER

## 2022-08-03 RX ORDER — DAPAGLIFLOZIN 5 MG/1
5 TABLET, FILM COATED ORAL DAILY
Qty: 30 TABLET | Refills: 3 | Status: SHIPPED | OUTPATIENT
Start: 2022-08-03 | End: 2023-01-19

## 2022-08-03 NOTE — PROGRESS NOTES
57 y.o. female, here for 6 month follow up for management of T2dm   Last seen in February 2022 - those notes are below.     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   She has new insurance - but not sure omnipod insulin pump is covered on her current plan -   She was on on omnipod classic prior visits.   vgo 20 prior to that.   The meal time insulin really seemed to help.     Stopped using omnipod when she ran out of pods - around April/may 2022 - she called me to notify me.   We tried to get omnipod dash covered without success.  I switched her to long acting insulin once per day at that point as she really wasn't needing much insulin (around 23 units/day).   Right now she is taking Tresiba 30 units once every morning.   Also on trulicity 3 mg SC every week - takes on thursdays -   Continues metformin 1000 mg twice per day.   We had not tried an sglt2i yet - she had been concerned about cost.   She has good renal function. And no CI's to this drug class.     Admits lately not following ADA diet.   Has gained some weight. But she is motivated to lose weight again.   She'd like to get off of insulin if possible.     Free style sami - personal.   Downloaded and reviewed today -   See scanned in .   Average glucose 161   70% in target range.   28% highs.   2% extreme highs.   No lows.       Last visit notes as follows from February 2022   57 y.o. female, here for routine 3 month follow up visit - for management of T2dm -   Last seen in November 2021 - those notes are below.     a1c is decreased from 6.9% to 6.4%.   Reports she has gained weight from holiday times - wasn't eating that great.   However is trying to restart better habits.   History of stroke (right MCA) -  impaired mobility at times  "- however she is back to work now.     Continues on metformin 1000 bid.    trulicity 3mg every week.   Had tried to add her on sglt2i in past, but it was too expensive and she really isn't privy to adding more medications at this time.   Switched her from vgo to omnipod - she is on older version (not dash) - gets through dme.   Was on novolog and now needs humalog rx today - her insurance will only cover.     omnipod downloaded - on glooko today -   Total daily dose is 23.2 units/day. She is needing much less insulin.   69% of insulin is coming from basal rate.   31% of insulin is coming from boluses.   Using pre-set boluses -   Basal rate is at 0.7 units/hour.   Active insulin time is set at 4 hours.  ISF set at 0 - does preset boluses - 4 or 6 units.   2 units for a snack.     On free style sami - downloaded and reviewed today -   Average glucose is 145   a1c estimated @ 6.8%   81% time in range.   0% lows.   17% highs.   2% extreme highs.     History of hyperthyroidism borderline in the past - her tft's have been stable since -   Her tsh today is slightly elevated at 4.588 - reports she is feeling sometimes fatigued.   Sometimes having night sweats, and has also gained weight over the past few months.   She is on zoloft - had dose increased and this has helped.       Last visit notes as follows from 11/2021:   56 y.o. female, here for follow up visit for management of T2dm -   Last seen 7/8/21 - those notes are below.     a1c more controlled @ 6.9%.   Trulicity 3 mg every week. No side effects (we had just increased her from the 1.5mg to 3mg every week).   Also on metformin 1000 mg twice per day.   On vgo 20 - 3 clicks with meal for most part - sometimes gives extra click for a "correction" if the bg is > 180 -   1 click with snack on occasion.   Using sami  - she met with diabetic educator -   She just downloaded it on her phone today - forgot her reader.   So not much data.   Average bg is 147 -   100% tir - " "see report scanned in .     Reports that she does have some lower sugar readings over night - sometimes 70 - 80's   But most of the time 100 - 130's during the day.   Not exercising a lot - has some back issues/so prohibited a bit from this.   Continuing on gabapentin which helps with her neuropathy in legs.   Also taking diclofenac 2 times per day which helps.   She is trying to get more active and is walking her dogs (she has 3 of them ) 3 times per day for walks.       Last visit notes as follows from 7/8/2021:   56 y.o. female, here for 3 month follow up visit for management of T2dm   Last seen April 2021 - those notes below.     a1c now improved @ 7.1%.   She is on metformin 1000 bid.   trulicity 1.5mg every week   And vgo 20 -     Continues on freestyle sami personal cgm -   Downloaded and reviewed today - see scanned in . -   Average bg is 143 -   a1c estimated at 6.7%.   85% TIR  0% lows.   1% very lows - 1 occasion.   13% highs.   1% extreme highs.         Last visit notes as follows from April 2021:  56 y.o. female here for follow up visit for T2dm, last seen 2/25/2021 - thos notes are blow.     a1c was last @ 13.7% and is now estimated at 6.2% on her cgm.   Labs reveal a1c currently is at 7.9%.   She has great trends. Feeling better. She is extremely happy with her results and quick turn around improvement.   Eating healthier, history of a stroke, but very motivated and refuses to 'be sick again".     Patient continues on metformin 1000 mg twice per day.   Also on trulicity 1.5mg every week.   In the interim, I switched her from MDI to vgo 20 - doing 3 clicks with meals tid.   Occasional clicks with snacks.   Met with tuyet sanchez and trained on vgo 3/5/2021 -     Had tried to start her on farxiga or jardiance, but too expensive on her plan,   And prefers to stay on same regimen.     Free style sami - interpreted today and see scanned in to .   Average glucose is 119.   a1c " estimated at 6.2%.   91% time in range.   3% lows (happening after meal time boluses and over night).   6%  Highs.       Last OV notes as follows:   56 y.o. female, here for 6 week follow up for T2dm management.   Last seen 1/26/2021 - those notes below.     a1c had been at 13.7%, but she had stopped her mealt kasia insulin and her glp1 before seeing me.   Continues on metformin 1000mg twice per day.   She is now on trulicity 1.5mg every week on fridays. Tolerating well - she's had some weight loss - 5 lbs since last visit.   Feels it does suppress appetite somewhat.   Continues on Levemir 30 units every night now. (was on lower amount 25 units).   She is back on novolog prior to meals - tid - 6 units. Reports it is difficult however to remember meal time boluses.   She is on injections 4 times per day. titrates her insulin based on self testing results.   She if finger sticking. dexcom was approved. But cost is high on her current insurance plan as she hasn't met her deductible yet.   States sugars are improved - no logs with her today -   States sugars fasting are running 170 - 200's now, so improved from 300's from past visit.   No acute complaints today's visit.       Last visit notes from 1/26/2021 as follows:   HPI: Rayne Aaron is a 57 y.o.  female c/I for visit to address Diabetes Type 2  This is the first time I am seeing her for care, follows with Dr. Deb Ware MD for primary care needs.   She has seen Donna George NP in the past for management for her diabetes. At [facility].   [facility], so wanted to switch providers.     was diagnosed with T2DM in 2009.    Her father, paternal grandparents and brothers and sisters all have T2dm. (9 siblings total)  Denies missing doses of DM medication.   Has been on and off medications through the years -  was on insulin, then controlled and taken off.   Also reports stopping/ran out last year in 2019.     Now has restarted her  insulin over the past month following a stroke.   Most recently was diagnosed with stroke in December 2020.   No deficits. She has recovered and is back working at home.   She is motivated to get her diabetes under control and prevent further progression of her diabetes.   She does not want another stroke.     Denies any other complications from diabetes   Denies nephropathy.   Denies Diabetic retinopathy.   + HTN, + HLD.   + stroke - now on aspirin, plavix and statin.     Last saw Donna George NP in 4/2019 -   Her a1c was > 14% at that time. And she had been off of her insulin at that time for about 3 months.   Current a1c is still high at 13.7%.   Had been ordered a vgo patch, but patient states she is not sure that it was ever approved.   She also had wanted to be on a cgm to avoid the frequent fingersticks, but states her insurance company never approved.      Current regimen:  Levemir 26 U HS  (previously on novolog insulin 8 units with meals - but ran out of rx. Hospital did not restart).   Metformin 1000 mg BID  Was on trulicity weekly and ozempic at one point weekly - but ran out, then insurance stopped approving.   Denies any known side effects to GLP1's.     She is checking her glucoses 4 times per day.   Reports history of hypoglycemic episodes and unawareness.   No bg logs - but reports sugars range from 250 - 350's overall.       Past medical History:   Past Medical History:   Diagnosis Date    Diabetes mellitus, type 2     Hyperlipidemia     Hypertension     Multinodular goiter 10/21/2015    Stroke     Subclinical hyperthyroidism 10/21/2015      Family hx:   Family History   Problem Relation Age of Onset    Dementia Mother     Heart disease Mother     Hypertension Mother     Heart disease Father     Diabetes Father     Diabetes Maternal Grandmother     Hypertension Maternal Grandmother     Ovarian cancer Maternal Grandmother     Depression Maternal Grandfather     Diabetes Paternal  "Grandmother     Peripheral vascular disease Paternal Grandmother     Hyperthyroidism Sister     Breast cancer Neg Hx     Colon cancer Neg Hx     Thyroid cancer Neg Hx       Current meds:   Current Outpatient Medications:     amLODIPine (NORVASC) 2.5 MG tablet, Take 1 tablet (2.5 mg total) by mouth once daily., Disp: 30 tablet, Rfl: 11    atorvastatin (LIPITOR) 80 MG tablet, Take 1 tablet (80 mg total) by mouth every evening., Disp: 90 tablet, Rfl: 3    diclofenac (VOLTAREN) 75 MG EC tablet, Take 1 tablet (75 mg total) by mouth 2 (two) times daily as needed (pain)., Disp: 60 tablet, Rfl: 2    FREESTYLE ANA 14 DAY SENSOR Kit, Apply topically., Disp: , Rfl:     gabapentin (NEURONTIN) 300 MG capsule, Take 1 capsule (300 mg total) by mouth 3 (three) times daily., Disp: 270 capsule, Rfl: 1    insulin degludec (TRESIBA FLEXTOUCH U-100) 100 unit/mL (3 mL) insulin pen, Inject 30 Units into the skin once daily. Use as back up /emergency only if off of vgo patch., Disp: 5 pen, Rfl: 1    metFORMIN (GLUCOPHAGE) 1000 MG tablet, Take 1 tablet (1,000 mg total) by mouth 2 (two) times daily with meals., Disp: 180 tablet, Rfl: 3    pantoprazole (PROTONIX) 40 MG tablet, Take 1 tablet (40 mg total) by mouth once daily., Disp: 30 tablet, Rfl: 3    pen needle, diabetic 32 gauge x 5/32" Ndle, Uses 3 times a day., Disp: 100 each, Rfl: 6    pen needle, diabetic 32 gauge x 5/32" Ndle, Use with insulin injection once daily - use with back up insulin in emergency if you are off of your vgo or omnipod, Disp: 90 each, Rfl: 3    sertraline (ZOLOFT) 100 MG tablet, Take 1 tablet (100 mg total) by mouth once daily., Disp: 90 tablet, Rfl: 3    TRULICITY 3 mg/0.5 mL pen injector, INJECT 3 MG UNDER THE SKIN EVERY 7 DAYS, Disp: 4 pen, Rfl: 11    valsartan (DIOVAN) 320 MG tablet, Take 1 tablet (320 mg total) by mouth once daily., Disp: 90 tablet, Rfl: 3    aspirin (ECOTRIN) 81 MG EC tablet, Take 1 tablet (81 mg total) by mouth once " daily., Disp: 180 tablet, Rfl: 2    dapagliflozin (FARXIGA) 5 mg Tab tablet, Take 1 tablet (5 mg total) by mouth once daily., Disp: 30 tablet, Rfl: 3     Current Diabetes medications:    Metformin 1000 mg po bid with food  trulicity 3 mg sc weekly.   Tresiba 30 units every morning     Medications Tried and Failed:   Had been on trulicity in past   ozempic in past.    Long acting insulin:  Levemir 30 units every HS.   Short acting insulin: novolog 6 units tid ac meals.  Was on VGO in past. Now switched to omnipod (insurance coverage).   novolog insulin via Omnipod pump (formerly on vgo).     Review of Pertinent co-morbidities/risk factors:   CV: Denies history of MI. + stroke.   CAD: Denies.  Takes aspirin 81mg tablet daily and plavix also.   BP: has history of HTN  Statin: Taking  ACE/ARB: Taking    Social History     Tobacco Use   Smoking Status Never Smoker   Smokeless Tobacco Never Used     Social:   Lives at home with her son.   Life changes/stressors currently:  passed away in October 2020 - so has been stressed since then.   Just started new job in December.   Diet: not always following ADA diet   Meals: 3 per day and snacks.        Breakfast - 1/2 of a muffin or banana       Lunch - subway, sandwich at home. Veggies.        Dinner - fish, smoked brisquet, steak. Potatoes.        Snacks - sugar free chocolate pudding. Yogurt, mixed fruit        Drinks - diet pepsi, diet coke, water. Flavored arredondo  Exercise: walking dogs.   Activities: working from Home. .     Glucose Monitoring:   Personal free style sami CGM  Previoulsy, was Checking sugars 4x/day by fingerstick.   Gets supplies from pharmacy.   Was getting Omnipods (older version - classic ) - mailed to her - through dme - she is not sure which company.     Standards of care:   Eyes: .: 02/18/2021  Foot exam: : 01/26/2021   Diabetes education:  Yes - February 2021.    Vital Signs  /80 (BP Location: Right arm, Patient  "Position: Sitting, BP Method: Large (Manual))   Pulse 86   Temp 97.2 °F (36.2 °C) (Temporal)   Resp 14   Ht 5' 3" (1.6 m)   Wt 87 kg (191 lb 12.8 oz)   LMP 09/08/2015 (Approximate)   SpO2 97%   BMI 33.98 kg/m²     Pertinent Labs:   Hgba1c   Lab Results   Component Value Date    HGBA1C 6.4 (H) 02/04/2022    HGBA1C 6.9 (H) 09/30/2021    HGBA1C 7.1 (H) 07/02/2021     Lipid panel   Lab Results   Component Value Date    CHOL 230 (H) 02/04/2022    CHOL 152 09/30/2021    CHOL 160 07/02/2021     Lab Results   Component Value Date    HDL 50 02/04/2022    HDL 47 09/30/2021    HDL 48 07/02/2021     Lab Results   Component Value Date    LDLCALC 143.8 02/04/2022    LDLCALC 90.0 09/30/2021    LDLCALC 91.8 07/02/2021     Lab Results   Component Value Date    TRIG 181 (H) 02/04/2022    TRIG 75 09/30/2021    TRIG 101 07/02/2021     Lab Results   Component Value Date    CHOLHDL 21.7 02/04/2022    CHOLHDL 30.9 09/30/2021    CHOLHDL 30.0 07/02/2021      CMP  Glucose   Date Value Ref Range Status   02/04/2022 113 (H) 70 - 110 mg/dL Final     BUN   Date Value Ref Range Status   02/04/2022 17 6 - 20 mg/dL Final     Creatinine   Date Value Ref Range Status   02/04/2022 0.7 0.5 - 1.4 mg/dL Final     eGFR if    Date Value Ref Range Status   02/04/2022 >60.0 >60 mL/min/1.73 m^2 Final     eGFR if non    Date Value Ref Range Status   02/04/2022 >60.0 >60 mL/min/1.73 m^2 Final     Comment:     Calculation used to obtain the estimated glomerular filtration  rate (eGFR) is the CKD-EPI equation.        AST   Date Value Ref Range Status   02/04/2022 13 10 - 40 U/L Final     ALT   Date Value Ref Range Status   02/04/2022 17 10 - 44 U/L Final     Microalbumin creatinine ratio:   Lab Results   Component Value Date    MICALBCREAT Unable to calculate 02/08/2022       Review Of Systems:   Gen: Appetite good, 5 lbs weight gain, denies fatigue and weakness. Denies polydipsia.  Skin: Skin is intact and heals well, " denies any rashes or hair changes.   EENT: Denies any acute visual disturbances, nor blurred vision.    Resp: Denies SOB or Dyspnea on exertion, denies cough.   Cardiac: Denies chest pain, palpitations, or swelling.   GI: Denies abdominal pain, nausea or vomiting, diarrhea, or constipation.   /GYN: Denies nocturia, nor burning, frequency or pain on urination.  MS/Neuro: + numbness/ tingling in BLE; some chronic pain in lower back and legs, history of sciatica - nothing recent.   Gait steady, speech clear  Psych: Denies drug/ETOH abuse, + hx of depression - is on zoloft. Had dose increased recently.   Other systems: negative.    Physical Exam:   GENERAL: Well developed, well nourished in appearance.   PSYCH: AAOx3, appropriate mood and affect, pleasant expression, conversant, appears relaxed, well groomed.   EYES: PERRL, Conjunctiva and corneas clear  NECK: Soft and Supple, trachea midline  CHEST: Even, regular, and unlabored respirations,  ABDOMEN: Soft, non-tender, non-distended.    VASCULAR: pedal pulses palpable bilaterally, no edema.  NEURO:  cranial nerves II - XII intact   MUSCULOSKELETAL: Good ROM, steady gait.   SKIN: Skin warm, dry, and intact     Assessment and Plan of Care:     Rayne was seen today for diabetes and follow-up.    Diagnoses and all orders for this visit:    Type 2 diabetes mellitus with hyperglycemia, with long-term current use of insulin  -     Hemoglobin A1C; Future  -     Comprehensive Metabolic Panel; Future  -     dapagliflozin (FARXIGA) 5 mg Tab tablet; Take 1 tablet (5 mg total) by mouth once daily.    History of embolic stroke involving right middle cerebral artery    Hyperlipidemia associated with type 2 diabetes mellitus    Hypertension associated with diabetes    Severe obesity    Impaired instrumental activities of daily living (IADL)         1. T2DM with hyperglycemia- Hgba1c goal is 7.5% or less without hypoglycemia - 8.4%--- > 14%--> 13.7%.---> 7.9% --> 7.1% --> 6.9%-->  6.4% current. Get new a1c and cmp today.   discussed DM, progression of disease, long term complications, CV risk factors and tx options.     Continue metformin 1000 bid.   Continue Tresiba 30 units every morning.   Continue Trulicity 3mg every week.   Start farxiga 5mg tablet daily - advised to wean the insulin down if able once she starts the farxiga.   Voucher given for free supply to start with and copay card to follow.   No known history of pancreatitis or medullary thyroid cancer.   If you experience severe abdominal pain, nausea, or diarrhea - please call me or notify my office immediately.   Prefers to keep same regimen at present.   Advise compliance with ADA diet and encourage exercise- gave list.   Reviewed  hypoglycemia, s/s and appropriate tx. Have/get quick acting glucose tablets at hand.   Instructed to monitor Blood glucose 2 - 4x/day and bring meter/ log to every clinic visit.   Continue on free style sami CGM  A CGM is MEDICALLY NECESSARY as it will allow me to virtually and more closely monitor glucose readings  This enables me to make any necessary adjustments to the patients diabetes regimen while keeping the patient and staff safe during the COVID-19 PHE.    2. HTN controlled for most part - continue meds as previously prescribed and monitor.   diovan 320mg tablet daily. norvasc 2.5 mg daily - refilled at prior visit.   Urine mac negative.     3. HLD- LDL goal < 100. at goal.   Currently on statin therapy- high dose - 80mg every HS. Should continue.   Will consider repatha in future if needed.    Refilled rx last visit.     4. Weight - BMI Body mass index is 33.98 kg/m².   Encourage Ada diet and exercise.   Continue glp1.   Consider increasing the trulicity up to 4.5mg or trying mounjaro if insurance will cover to get off of the insulin -     5. Renal Function - stable. Will monitor trends.     6. MNG - history of MNG - no compressive symptoms.   Last thyroid Ultrasound was in 2015 and FNA  negative.   Would recommend repeat for surveillance. Will discuss at follow up visit.  Repeat tft's on next lab draw in 2 weeks.   tsh was slightly high - 4.5 - if still elevated can treat with synthroid 50 daily - adult onset hypothyroidism.     7. S/p Right MCA stroke - on plavix and aspirin and high dose statin now.   Discussed CV risk factors.      8. Lumbar stenosis - has been recommended spine surgery. Doesn't want surgery for now - concerned as her family members had bad experiences.   I recommended possible spinal injections - placed consult for pain management. She is going. Now in physical therapy.   Still having sciatica down her right leg despite nsaids and therapy.         Labs today -   OV in 3 months.

## 2022-08-03 NOTE — PATIENT INSTRUCTIONS
Continue metformin 1000mg twice per day   Continue Trulicity injection every week.   Continue Tresiba 30 units every morning - but wean insulin less if needed - based on blood sugar readings.   Use your sami and scan more often.   Start farxiga 5mg tablet daily -   Stay hydrated. Drink your water daily  Diabetic diet.     For low blood sugar - remember to keep glucose tablets on hand. You can purchase these at the pharmacy check out desk/over the counter.   If blood sugar is less than 70, take/eat 2 - 3 tablets quickly to bring your sugar up.   Always keep 15 grams of Quick acting carbohydrates on hand to eat/drink if your sugar is low - examples are 1/2 cup of juice, coke, or crackers, granola bars.   Remember to eat meals frequently to prevent low blood blood sugars.

## 2022-08-15 ENCOUNTER — HOME CARE VISIT (OUTPATIENT)
Dept: NEUROLOGY | Facility: HOSPITAL | Age: 57
End: 2022-08-15
Payer: COMMERCIAL

## 2022-08-23 ENCOUNTER — TELEPHONE (OUTPATIENT)
Dept: OPHTHALMOLOGY | Facility: CLINIC | Age: 57
End: 2022-08-23
Payer: COMMERCIAL

## 2022-08-23 NOTE — TELEPHONE ENCOUNTER
Left message stating  has an appointment for 8/24/2022 at 10:00 a.m , 1st floor in the optical shop of the [facility].

## 2022-09-22 ENCOUNTER — OFFICE VISIT (OUTPATIENT)
Dept: OPHTHALMOLOGY | Facility: CLINIC | Age: 57
End: 2022-09-22
Payer: COMMERCIAL

## 2022-09-22 ENCOUNTER — CLINICAL SUPPORT (OUTPATIENT)
Dept: OPHTHALMOLOGY | Facility: CLINIC | Age: 57
End: 2022-09-22
Payer: COMMERCIAL

## 2022-09-22 DIAGNOSIS — H53.462 LEFT HOMONYMOUS HEMIANOPSIA: Primary | ICD-10-CM

## 2022-09-22 DIAGNOSIS — H53.462 LEFT HOMONYMOUS INFERIOR QUADRANTANOPIA: Primary | ICD-10-CM

## 2022-09-22 DIAGNOSIS — H53.462 LEFT HOMONYMOUS HEMIANOPSIA: ICD-10-CM

## 2022-09-22 PROCEDURE — 99213 OFFICE O/P EST LOW 20 MIN: CPT | Mod: S$GLB,,, | Performed by: OPHTHALMOLOGY

## 2022-09-22 PROCEDURE — 99999 PR PBB SHADOW E&M-EST. PATIENT-LVL III: CPT | Mod: PBBFAC,,, | Performed by: OPHTHALMOLOGY

## 2022-09-22 PROCEDURE — 2023F PR DILATED RETINAL EXAM W/O EVID OF RETINOPATHY: ICD-10-PCS | Mod: CPTII,S$GLB,, | Performed by: OPHTHALMOLOGY

## 2022-09-22 PROCEDURE — 1160F PR REVIEW ALL MEDS BY PRESCRIBER/CLIN PHARMACIST DOCUMENTED: ICD-10-PCS | Mod: CPTII,S$GLB,, | Performed by: OPHTHALMOLOGY

## 2022-09-22 PROCEDURE — 4010F PR ACE/ARB THEARPY RXD/TAKEN: ICD-10-PCS | Mod: CPTII,S$GLB,, | Performed by: OPHTHALMOLOGY

## 2022-09-22 PROCEDURE — 99213 PR OFFICE/OUTPT VISIT, EST, LEVL III, 20-29 MIN: ICD-10-PCS | Mod: S$GLB,,, | Performed by: OPHTHALMOLOGY

## 2022-09-22 PROCEDURE — 1159F MED LIST DOCD IN RCRD: CPT | Mod: CPTII,S$GLB,, | Performed by: OPHTHALMOLOGY

## 2022-09-22 PROCEDURE — 3051F PR MOST RECENT HEMOGLOBIN A1C LEVEL 7.0 - < 8.0%: ICD-10-PCS | Mod: CPTII,S$GLB,, | Performed by: OPHTHALMOLOGY

## 2022-09-22 PROCEDURE — 3061F NEG MICROALBUMINURIA REV: CPT | Mod: CPTII,S$GLB,, | Performed by: OPHTHALMOLOGY

## 2022-09-22 PROCEDURE — 3051F HG A1C>EQUAL 7.0%<8.0%: CPT | Mod: CPTII,S$GLB,, | Performed by: OPHTHALMOLOGY

## 2022-09-22 PROCEDURE — 3066F PR DOCUMENTATION OF TREATMENT FOR NEPHROPATHY: ICD-10-PCS | Mod: CPTII,S$GLB,, | Performed by: OPHTHALMOLOGY

## 2022-09-22 PROCEDURE — 2023F DILAT RTA XM W/O RTNOPTHY: CPT | Mod: CPTII,S$GLB,, | Performed by: OPHTHALMOLOGY

## 2022-09-22 PROCEDURE — 1160F RVW MEDS BY RX/DR IN RCRD: CPT | Mod: CPTII,S$GLB,, | Performed by: OPHTHALMOLOGY

## 2022-09-22 PROCEDURE — 3066F NEPHROPATHY DOC TX: CPT | Mod: CPTII,S$GLB,, | Performed by: OPHTHALMOLOGY

## 2022-09-22 PROCEDURE — 99999 PR PBB SHADOW E&M-EST. PATIENT-LVL III: ICD-10-PCS | Mod: PBBFAC,,, | Performed by: OPHTHALMOLOGY

## 2022-09-22 PROCEDURE — 4010F ACE/ARB THERAPY RXD/TAKEN: CPT | Mod: CPTII,S$GLB,, | Performed by: OPHTHALMOLOGY

## 2022-09-22 PROCEDURE — 3061F PR NEG MICROALBUMINURIA RESULT DOCUMENTED/REVIEW: ICD-10-PCS | Mod: CPTII,S$GLB,, | Performed by: OPHTHALMOLOGY

## 2022-09-22 PROCEDURE — 1159F PR MEDICATION LIST DOCUMENTED IN MEDICAL RECORD: ICD-10-PCS | Mod: CPTII,S$GLB,, | Performed by: OPHTHALMOLOGY

## 2022-09-22 NOTE — PROGRESS NOTES
HVF done ou. Rel/fix/coop. Good ou. /chart checked for latex allergy./-1.50 /od -1.50 + .25 1//os-smh

## 2022-09-22 NOTE — PROGRESS NOTES
HPI    DLS:09/27/2021 [place]  Patient here for annual check up Left homonymous hemianopsia.  Pt states no change vision since last seen.  No eye pain.  Review HVF.    I have personally interviewed the patient, reviewed the history and   examined the patient and agree with the technician's exam.   Last edited by Darin Prajapati MD on 9/22/2022  3:20 PM.            Assessment /Plan     For exam results, see Encounter Report.    Left homonymous inferior quadrantanopia      There has been no interim change in peripheral vision since her last visit a year ago. She  will return to me as needed.

## 2022-10-03 ENCOUNTER — PATIENT MESSAGE (OUTPATIENT)
Dept: ADMINISTRATIVE | Facility: HOSPITAL | Age: 57
End: 2022-10-03
Payer: COMMERCIAL

## 2022-10-10 ENCOUNTER — DOCUMENTATION ONLY (OUTPATIENT)
Dept: ADMINISTRATIVE | Facility: HOSPITAL | Age: 57
End: 2022-10-10
Payer: COMMERCIAL

## 2022-10-15 ENCOUNTER — LAB VISIT (OUTPATIENT)
Dept: LAB | Facility: HOSPITAL | Age: 57
End: 2022-10-15
Attending: HOSPITALIST
Payer: COMMERCIAL

## 2022-10-15 DIAGNOSIS — Z12.11 COLON CANCER SCREENING: ICD-10-CM

## 2022-10-15 PROCEDURE — 82274 ASSAY TEST FOR BLOOD FECAL: CPT | Performed by: HOSPITALIST

## 2022-10-17 ENCOUNTER — HOME CARE VISIT (OUTPATIENT)
Dept: NEUROLOGY | Facility: HOSPITAL | Age: 57
End: 2022-10-17
Payer: COMMERCIAL

## 2022-10-20 LAB — HEMOCCULT STL QL IA: NEGATIVE

## 2022-11-07 ENCOUNTER — TELEPHONE (OUTPATIENT)
Dept: INTERNAL MEDICINE | Facility: CLINIC | Age: 57
End: 2022-11-07
Payer: COMMERCIAL

## 2022-11-07 NOTE — TELEPHONE ENCOUNTER
----- Message from Ileana Durant sent at 11/7/2022 12:54 PM CST -----  Contact: [phone]  Pt is requesting a call back, she states that the new appt does not work she needs to be seen sooner, please contact patient early in the morning before 8:30am

## 2022-11-07 NOTE — TELEPHONE ENCOUNTER
LVM that she is scheduled for the soonest appointment at this time and has been placed on the waiting list.

## 2022-11-29 ENCOUNTER — TELEPHONE (OUTPATIENT)
Dept: INFECTIOUS DISEASES | Facility: HOSPITAL | Age: 57
End: 2022-11-29
Payer: COMMERCIAL

## 2022-11-29 ENCOUNTER — OFFICE VISIT (OUTPATIENT)
Dept: INTERNAL MEDICINE | Facility: CLINIC | Age: 57
End: 2022-11-29
Payer: COMMERCIAL

## 2022-11-29 DIAGNOSIS — E11.59 HYPERTENSION ASSOCIATED WITH DIABETES: ICD-10-CM

## 2022-11-29 DIAGNOSIS — U07.1 COVID: ICD-10-CM

## 2022-11-29 DIAGNOSIS — U07.1 COVID-19: Primary | ICD-10-CM

## 2022-11-29 DIAGNOSIS — I15.2 HYPERTENSION ASSOCIATED WITH DIABETES: ICD-10-CM

## 2022-11-29 DIAGNOSIS — I63.411 EMBOLIC STROKE INVOLVING RIGHT MIDDLE CEREBRAL ARTERY: ICD-10-CM

## 2022-11-29 PROCEDURE — 3061F PR NEG MICROALBUMINURIA RESULT DOCUMENTED/REVIEW: ICD-10-PCS | Mod: CPTII,95,, | Performed by: NURSE PRACTITIONER

## 2022-11-29 PROCEDURE — 4010F PR ACE/ARB THEARPY RXD/TAKEN: ICD-10-PCS | Mod: CPTII,95,, | Performed by: NURSE PRACTITIONER

## 2022-11-29 PROCEDURE — 3061F NEG MICROALBUMINURIA REV: CPT | Mod: CPTII,95,, | Performed by: NURSE PRACTITIONER

## 2022-11-29 PROCEDURE — 3051F PR MOST RECENT HEMOGLOBIN A1C LEVEL 7.0 - < 8.0%: ICD-10-PCS | Mod: CPTII,95,, | Performed by: NURSE PRACTITIONER

## 2022-11-29 PROCEDURE — 3066F NEPHROPATHY DOC TX: CPT | Mod: CPTII,95,, | Performed by: NURSE PRACTITIONER

## 2022-11-29 PROCEDURE — 3051F HG A1C>EQUAL 7.0%<8.0%: CPT | Mod: CPTII,95,, | Performed by: NURSE PRACTITIONER

## 2022-11-29 PROCEDURE — 99214 OFFICE O/P EST MOD 30 MIN: CPT | Mod: 95,,, | Performed by: NURSE PRACTITIONER

## 2022-11-29 PROCEDURE — 99214 PR OFFICE/OUTPT VISIT, EST, LEVL IV, 30-39 MIN: ICD-10-PCS | Mod: 95,,, | Performed by: NURSE PRACTITIONER

## 2022-11-29 PROCEDURE — 3066F PR DOCUMENTATION OF TREATMENT FOR NEPHROPATHY: ICD-10-PCS | Mod: CPTII,95,, | Performed by: NURSE PRACTITIONER

## 2022-11-29 PROCEDURE — 4010F ACE/ARB THERAPY RXD/TAKEN: CPT | Mod: CPTII,95,, | Performed by: NURSE PRACTITIONER

## 2022-11-29 NOTE — PROGRESS NOTES
Subjective:    The patient location is: [place]   The chief complaint leading to consultation is: flu    Visit type: audiovisual    Face to Face time with patient: 15  30 minutes of total time spent on the encounter, which includes face to face time and non-face to face time preparing to see the patient (eg, review of tests), Obtaining and/or reviewing separately obtained history, Documenting clinical information in the electronic or other health record, Independently interpreting results (not separately reported) and communicating results to the patient/family/caregiver, or Care coordination (not separately reported).         Each patient to whom he or she provides medical services by telemedicine is:  (1) informed of the relationship between the physician and patient and the respective role of any other health care provider with respect to management of the patient; and (2) notified that he or she may decline to receive medical services by telemedicine and may withdraw from such care at any time.    Notes:      Patient ID: Rayne Aaron is a 57 y.o. female.    Chief Complaint: No chief complaint on file.    Cough  This is a new problem. The current episode started in the past 7 days. The problem has been waxing and waning. The problem occurs hourly. The cough is Productive of sputum. Associated symptoms include chills, a fever, headaches, myalgias, nasal congestion, postnasal drip, rhinorrhea, a sore throat, shortness of breath and sweats. Pertinent negatives include no chest pain, ear congestion, ear pain, heartburn, hemoptysis, rash, weight loss or wheezing. The symptoms are aggravated by lying down. She has tried OTC cough suppressant for the symptoms. The treatment provided mild relief. Her past medical history is significant for pneumonia. There is no history of asthma, bronchiectasis, bronchitis, COPD, emphysema or environmental allergies.      This is a 57-year-old female patient of Dr. Ware with chronic  conditions of depression, hyperlipidemia, hypertension, obesity, type 2 diabetes and history of embolic stroke x 2 years ago who is presenting with flu-like symptoms of fever and chills, head congestion, postnasal drip, rhinorrhea, sore throat, non-productive,  cough shortness of breath, and headache, body aches, weakness and fever over the past 4 days. Pt reports that she was tested + for covid yesterday. Has been taking mucinex fast max w/o much relief. 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 Negative for weight loss.   HENT:  Positive for postnasal drip, rhinorrhea and sore throat. Negative for ear pain.    Respiratory:  Positive for cough and shortness of breath. Negative for hemoptysis and wheezing.    Cardiovascular:  Negative for chest pain.   Gastrointestinal:  Negative for heartburn.   Musculoskeletal:  Positive for myalgias.   Integumentary:  Negative for rash.   Allergic/Immunologic: Negative for environmental allergies.   Neurological:  Positive for headaches.       Objective:      Physical Exam  Constitutional:       General: She is not in acute distress.     Appearance: Normal appearance. She is ill-appearing.   HENT:      Head: Normocephalic and atraumatic.   Cardiovascular:      Comments: No signs and symptoms of distress noted  Pulmonary:      Effort: Pulmonary effort is normal. No respiratory distress.   Skin:     General: Skin is dry.   Neurological:      Mental Status: She is alert and oriented to person, place, and time.   Psychiatric:         Mood and Affect: Mood normal.         Behavior: Behavior normal.         Thought Content: Thought content normal.         Judgment: Judgment normal.       Assessment:       Problem List Items Addressed This Visit    None      Plan:       Continue mucinex  Monitor symptoms- for worsening of symptoms go to  the ER  Stay very hydrated with water, warm tea and soup  Monitor BS closely, soft bland diet  Get up an move around at least every hour  F/u as needed for any concerns

## 2022-11-29 NOTE — TELEPHONE ENCOUNTER
Per [facility] policy, this patient was triaged for conversion to the appropriate treatment (as outlined in the policy and standing order). This patient was evaluated for drug interactions based on the current medication list, and no contraindications to Paxlovid were present. Patient met criteria for conversion to the first line treatment, Paxlovid (under standing orders) and was offered a prescription for Paxlovid (dosed appropriately by eGFR). Patient accepted. Advised her to pause atorvastatin 10 days total, pause amlodipine 8 days total, monitor for signs of symptomatic hypotension and if it occurs skip next dose of valsartan.

## 2022-12-28 ENCOUNTER — PATIENT MESSAGE (OUTPATIENT)
Dept: INTERNAL MEDICINE | Facility: CLINIC | Age: 57
End: 2022-12-28
Payer: COMMERCIAL

## 2023-01-05 ENCOUNTER — TELEPHONE (OUTPATIENT)
Dept: INTERNAL MEDICINE | Facility: CLINIC | Age: 58
End: 2023-01-05
Payer: COMMERCIAL

## 2023-01-05 ENCOUNTER — PATIENT MESSAGE (OUTPATIENT)
Dept: INTERNAL MEDICINE | Facility: CLINIC | Age: 58
End: 2023-01-05
Payer: COMMERCIAL

## 2023-01-05 NOTE — TELEPHONE ENCOUNTER
----- Message from Charmaine Jones sent at 1/5/2023 11:32 AM CST -----  Contact: [phone]  2nd message        Pt is calling in regards to rescheduling her appt that is on 01/10. Please call.              Thank you

## 2023-01-05 NOTE — TELEPHONE ENCOUNTER
Let pt know to follow up with the primary provider for her concerns, and when the schedule re opens we will call to schedule the pt

## 2023-01-05 NOTE — TELEPHONE ENCOUNTER
----- Message from Camila Agee sent at 1/5/2023  2:35 PM CST -----  Contact: pt [phone]  Patient is inquiring about scan of stroke and paperwork for jury duty. Please call and advise.    Thank you and have a great day.

## 2023-02-09 RX ORDER — ATORVASTATIN CALCIUM 80 MG/1
TABLET, FILM COATED ORAL
Qty: 90 TABLET | Refills: 0 | Status: SHIPPED | OUTPATIENT
Start: 2023-02-09 | End: 2023-03-08 | Stop reason: SDUPTHER

## 2023-02-16 DIAGNOSIS — E11.9 TYPE 2 DIABETES MELLITUS WITHOUT COMPLICATION: ICD-10-CM

## 2023-02-22 ENCOUNTER — PATIENT MESSAGE (OUTPATIENT)
Dept: ADMINISTRATIVE | Facility: HOSPITAL | Age: 58
End: 2023-02-22
Payer: COMMERCIAL

## 2023-02-24 RX ORDER — FLASH GLUCOSE SENSOR
KIT MISCELLANEOUS
Qty: 2 KIT | Refills: 11 | Status: SHIPPED | OUTPATIENT
Start: 2023-02-24 | End: 2023-08-15

## 2023-03-06 NOTE — TELEPHONE ENCOUNTER
----- Message from Charmaine Jones sent at 1/5/2023  8:32 AM CST -----  Contact: [phone] pt  Pt is returning a call in regards to rescheduling her appt on 01/10. Per pt, please call to schedule a new appt as soon as possible.            Thank you     ----- Message from Helen Rodriguez sent at 3/6/2023 11:27 AM CST -----  Pt is calling to schedule surgery. Please call pt# [phone]

## 2023-03-07 NOTE — PROGRESS NOTES
"Subjective:       Patient ID: Rayne Aaron is a 58 y.o. female.    Chief Complaint: Annual Wellness Visit      Rayne Aaron is a 58 y.o. female who presents today for an annual wellness visit.     She notes that the bottom of her left foot feels "swollen". Has history of spinal stenosis and is on Gabapentin. States she was evaluated by Podiatry who suggested it was Plantar fascitis. She has been using conservative measures with no improvement.     Review of patient's allergies indicates:   Allergen Reactions    Penicillins Other (See Comments)     Facial numbness    Sulfa (sulfonamide antibiotics) Rash      Medication List with Changes/Refills   New Medications    FLUCONAZOLE (DIFLUCAN) 150 MG TAB    Take 1 tablet (150 mg total) by mouth once daily. for 1 day    PREDNISONE (DELTASONE) 20 MG TABLET    Take 2 tablets (40 mg total) by mouth once daily. for 5 days    VARICELLA-ZOSTER GE-AS01B, PF, (SHINGRIX, PF,) 50 MCG/0.5 ML INJECTION    Inject 0.5 mLs into the muscle once. for 1 dose   Current Medications    ASPIRIN (ECOTRIN) 81 MG EC TABLET    Take 1 tablet (81 mg total) by mouth once daily.    DICLOFENAC (VOLTAREN) 75 MG EC TABLET    Take 1 tablet (75 mg total) by mouth 2 (two) times daily as needed (pain).    FARXIGA 5 MG TAB TABLET    TAKE 1 TABLET(5 MG) BY MOUTH EVERY DAY    FLASH GLUCOSE SENSOR (FREESTYLE ANA 14 DAY SENSOR) KIT    APPLY TO SKIN AS DIRECTED AND CHANGE SENSOR EVERY 14 DAYS    PEN NEEDLE, DIABETIC 32 GAUGE X 5/32" NDLE    Uses 3 times a day.    PEN NEEDLE, DIABETIC 32 GAUGE X 5/32" NDLE    Use with insulin injection once daily - use with back up insulin in emergency if you are off of your vgo or omnipod    TRULICITY 3 MG/0.5 ML PEN INJECTOR    INJECT 3 MG UNDER THE SKIN EVERY 7 DAYS   Changed and/or Refilled Medications    Modified Medication Previous Medication    AMLODIPINE (NORVASC) 2.5 MG TABLET amLODIPine (NORVASC) 2.5 MG tablet       Take 1 tablet (2.5 mg total) by mouth once daily.    " Take 1 tablet (2.5 mg total) by mouth once daily.    ATORVASTATIN (LIPITOR) 80 MG TABLET atorvastatin (LIPITOR) 80 MG tablet       Take 1 tablet (80 mg total) by mouth every evening.    TAKE 1 TABLET(80 MG) BY MOUTH EVERY EVENING    GABAPENTIN (NEURONTIN) 300 MG CAPSULE gabapentin (NEURONTIN) 300 MG capsule       Take 1 capsule (300 mg total) by mouth 3 (three) times daily.    Take 1 capsule (300 mg total) by mouth 3 (three) times daily.    INSULIN (LANTUS SOLOSTAR U-100 INSULIN) GLARGINE 100 UNITS/ML SUBQ PEN insulin degludec (TRESIBA FLEXTOUCH U-100) 100 unit/mL (3 mL) insulin pen       Inject 30 Units into the skin once daily.    Inject 30 Units into the skin once daily. Use daily only if not on VGO or omnipod insulin patch daily    METFORMIN (GLUCOPHAGE) 1000 MG TABLET metFORMIN (GLUCOPHAGE) 1000 MG tablet       Take 1 tablet (1,000 mg total) by mouth 2 (two) times daily with meals.    Take 1 tablet (1,000 mg total) by mouth 2 (two) times daily with meals.    SERTRALINE (ZOLOFT) 100 MG TABLET sertraline (ZOLOFT) 100 MG tablet       Take 1 tablet (100 mg total) by mouth once daily.    Take 1 tablet (100 mg total) by mouth once daily.    VALSARTAN (DIOVAN) 320 MG TABLET valsartan (DIOVAN) 320 MG tablet       Take 1 tablet (320 mg total) by mouth once daily.    Take 1 tablet (320 mg total) by mouth once daily.   Discontinued Medications    PANTOPRAZOLE (PROTONIX) 40 MG TABLET    Take 1 tablet (40 mg total) by mouth once daily.       Health Maintenance    MM2022  PAP: 2021  Occult Blood/Cologuard: 10/20/2022  Flu Vaccine: 2023   Pneumonia 23 Vaccine: 2019  Pneumonia 20 Vaccine: 2023   Tetanus/Tdap: 2019  Zoster Vaccine: Rx Sent   Hepatitis C Screen: 2019  HIV Screen: 2019  Eye Exam: 2022  Foot Exam: 2023   Microalbumin: 2022      Patient ambulates on her own without an assistive device.     Do you still have a menstrual cycle? No    Do you go to  "the dentist regularly? Yes  When was you last exam:     Do you go to the eye doctor regularly? Yes  When was your last exam:    Do you wear contacts, glasses, reading glasses? Yes    Review of Systems   Genitourinary:         Vaginal itching   Musculoskeletal:  Positive for arthralgias.      Objective:     /84 (BP Location: Right arm, Patient Position: Sitting, BP Method: Large (Manual))   Pulse 68   Temp 98.8 °F (37.1 °C) (Temporal)   Resp 18   Ht 5' 3" (1.6 m)   Wt 86.4 kg (190 lb 7.6 oz)   LMP 09/08/2015 (Approximate)   SpO2 98%   BMI 33.74 kg/m²     Physical Exam  Vitals reviewed.   Constitutional:       Appearance: Normal appearance.   HENT:      Head: Normocephalic and atraumatic.   Cardiovascular:      Rate and Rhythm: Normal rate and regular rhythm.      Heart sounds: Normal heart sounds. No murmur heard.  Pulmonary:      Effort: Pulmonary effort is normal.      Breath sounds: Normal breath sounds. No wheezing.   Feet:      Right foot:      Skin integrity: Skin integrity normal.      Left foot:      Skin integrity: Skin integrity normal.   Skin:     General: Skin is warm and dry.   Neurological:      Mental Status: She is alert and oriented to person, place, and time.       Protective Sensation (w/ 10 gram monofilament):  Right: Intact  Left: Intact    Visual Inspection:  Normal -  Bilateral    Pedal Pulses:   Right: Present  Left: Present    BP Readings from Last 3 Encounters:   03/08/23 106/84   08/03/22 128/80   06/21/22 125/82     Wt Readings from Last 3 Encounters:   03/08/23 86.4 kg (190 lb 7.6 oz)   08/03/22 87 kg (191 lb 12.8 oz)   08/02/22 86.2 kg (190 lb)       Last Labs:  Glucose   Date Value Ref Range Status   08/03/2022 128 (H) 70 - 110 mg/dL Final   02/04/2022 113 (H) 70 - 110 mg/dL Final     BUN   Date Value Ref Range Status   08/03/2022 15 6 - 20 mg/dL Final   02/04/2022 17 6 - 20 mg/dL Final     Creatinine   Date Value Ref Range Status   08/03/2022 0.9 0.5 - 1.4 mg/dL Final "   02/04/2022 0.7 0.5 - 1.4 mg/dL Final     Cholesterol   Date Value Ref Range Status   02/04/2022 230 (H) 120 - 199 mg/dL Final     Comment:     The National Cholesterol Education Program (NCEP) has set the  following guidelines (reference ranges) for Cholesterol:  Optimal.....................<200 mg/dL  Borderline High.............200-239 mg/dL  High........................> or = 240 mg/dL     09/30/2021 152 120 - 199 mg/dL Final     Comment:     The National Cholesterol Education Program (NCEP) has set the  following guidelines (reference ranges) for Cholesterol:  Optimal.....................<200 mg/dL  Borderline High.............200-239 mg/dL  High........................> or = 240 mg/dL       Hemoglobin A1C   Date Value Ref Range Status   08/03/2022 7.5 (H) 4.0 - 5.6 % Final     Comment:     ADA Screening Guidelines:  5.7-6.4%  Consistent with prediabetes  >or=6.5%  Consistent with diabetes    High levels of fetal hemoglobin interfere with the HbA1C  assay. Heterozygous hemoglobin variants (HbS, HgC, etc)do  not significantly interfere with this assay.   However, presence of multiple variants may affect accuracy.     02/04/2022 6.4 (H) 4.0 - 5.6 % Final     Comment:     ADA Screening Guidelines:  5.7-6.4%  Consistent with prediabetes  >or=6.5%  Consistent with diabetes    High levels of fetal hemoglobin interfere with the HbA1C  assay. Heterozygous hemoglobin variants (HbS, HgC, etc)do  not significantly interfere with this assay.   However, presence of multiple variants may affect accuracy.       Hemoglobin   Date Value Ref Range Status   03/08/2023 13.7 12.0 - 16.0 g/dL Final   03/31/2021 12.6 12.0 - 16.0 g/dL Final     Hematocrit   Date Value Ref Range Status   03/08/2023 43.2 37.0 - 48.5 % Final   03/31/2021 38.5 37.0 - 48.5 % Final     Vit D, 25-Hydroxy   Date Value Ref Range Status   05/09/2019 27 (L) 30 - 96 ng/mL Final     Comment:     Vitamin D deficiency.........<10 ng/mL                               Vitamin D insufficiency......10-29 ng/mL       Vitamin D sufficiency........> or equal to 30 ng/mL  Vitamin D toxicity............>100 ng/mL         I have reviewed the following:     Details / Date    [x]   Labs     []   Micro     []   Pathology     []   Imaging     []   Cardiology Procedures     [x]   Other  Patient's Medical and Surgical History        Assessment and Plan:     1. Encounter for annual health examination    --Nutrition: Discussed the importance of moderate caffeine intake. Adhering to a low sodium, low saturated fat/low cholesterol diet.   --Exercise: Discussed the importance of regular exercise. At least 30 minutes 5 days per week.   --Immunizations reviewed.  --Discussed benefits of maintaining up to date health maintenance.    2. Left foot pain  - predniSONE (DELTASONE) 20 MG tablet; Take 2 tablets (40 mg total) by mouth once daily. for 5 days  Dispense: 10 tablet; Refill: 0    3. Vaginal candidiasis  - fluconazole (DIFLUCAN) 150 MG Tab; Take 1 tablet (150 mg total) by mouth once daily. for 1 day  Dispense: 2 tablet; Refill: 0    4. Depression, unspecified depression type    Chronic, stable, continue current medication    - sertraline (ZOLOFT) 100 MG tablet; Take 1 tablet (100 mg total) by mouth once daily.  Dispense: 90 tablet; Refill: 3    5. Hypertension associated with diabetes    Chronic, stable, continue current medications    - amLODIPine (NORVASC) 2.5 MG tablet; Take 1 tablet (2.5 mg total) by mouth once daily.  Dispense: 90 tablet; Refill: 3  - valsartan (DIOVAN) 320 MG tablet; Take 1 tablet (320 mg total) by mouth once daily.  Dispense: 90 tablet; Refill: 3    6. Type 2 diabetes mellitus with hyperglycemia, with long-term current use of insulin    Chronic, stable, continue current medications - may consider changing Farxiga due to yeast infections.    - CBC Auto Differential; Future  - Comprehensive Metabolic Panel; Future  - Microalbumin/Creatinine Ratio, Urine; Future  -  atorvastatin (LIPITOR) 80 MG tablet; Take 1 tablet (80 mg total) by mouth every evening.  Dispense: 90 tablet; Refill: 3  - metFORMIN (GLUCOPHAGE) 1000 MG tablet; Take 1 tablet (1,000 mg total) by mouth 2 (two) times daily with meals.  Dispense: 180 tablet; Refill: 3  - insulin (LANTUS SOLOSTAR U-100 INSULIN) glargine 100 units/mL SubQ pen; Inject 30 Units into the skin once daily.  Dispense: 27 mL; Refill: 3    7. Hyperlipidemia associated with type 2 diabetes mellitus    Chronic, stable, continue current medication    8. Multinodular goiter    Chronic, lab today, may consider repeat imaging in future.     - TSH; Future    9. Spinal stenosis of lumbar region, unspecified whether neurogenic claudication present    Chronic, stable, continue current medication    - gabapentin (NEURONTIN) 300 MG capsule; Take 1 capsule (300 mg total) by mouth 3 (three) times daily.  Dispense: 270 capsule; Refill: 3    10. Class 1 obesity due to excess calories with serious comorbidity and body mass index (BMI) of 33.0 to 33.9 in adult    Chronic, stable    11. History of embolic stroke involving right middle cerebral artery    12. Need for vaccination    - varicella-zoster [id number], PF, (SHINGRIX, PF,) 50 mcg/0.5 mL injection; Inject 0.5 mLs into the muscle once. for 1 dose  Dispense: 1 each; Refill: 0  - (In Office Administered) Pneumococcal Conjugate Vaccine (20 Valent) (IM)

## 2023-03-08 ENCOUNTER — OFFICE VISIT (OUTPATIENT)
Dept: INTERNAL MEDICINE | Facility: CLINIC | Age: 58
End: 2023-03-08
Payer: COMMERCIAL

## 2023-03-08 ENCOUNTER — LAB VISIT (OUTPATIENT)
Dept: LAB | Facility: HOSPITAL | Age: 58
End: 2023-03-08
Attending: HOSPITALIST
Payer: COMMERCIAL

## 2023-03-08 VITALS
OXYGEN SATURATION: 98 % | BODY MASS INDEX: 33.75 KG/M2 | HEART RATE: 68 BPM | HEIGHT: 63 IN | TEMPERATURE: 99 F | SYSTOLIC BLOOD PRESSURE: 106 MMHG | DIASTOLIC BLOOD PRESSURE: 84 MMHG | WEIGHT: 190.5 LBS | RESPIRATION RATE: 18 BRPM

## 2023-03-08 DIAGNOSIS — I15.2 HYPERTENSION ASSOCIATED WITH DIABETES: ICD-10-CM

## 2023-03-08 DIAGNOSIS — Z79.4 TYPE 2 DIABETES MELLITUS WITH HYPERGLYCEMIA, WITH LONG-TERM CURRENT USE OF INSULIN: ICD-10-CM

## 2023-03-08 DIAGNOSIS — I63.411 EMBOLIC STROKE INVOLVING RIGHT MIDDLE CEREBRAL ARTERY: ICD-10-CM

## 2023-03-08 DIAGNOSIS — F32.A DEPRESSION, UNSPECIFIED DEPRESSION TYPE: ICD-10-CM

## 2023-03-08 DIAGNOSIS — E11.65 TYPE 2 DIABETES MELLITUS WITH HYPERGLYCEMIA, WITH LONG-TERM CURRENT USE OF INSULIN: ICD-10-CM

## 2023-03-08 DIAGNOSIS — E03.9 HYPOTHYROIDISM, UNSPECIFIED TYPE: ICD-10-CM

## 2023-03-08 DIAGNOSIS — E11.59 HYPERTENSION ASSOCIATED WITH DIABETES: ICD-10-CM

## 2023-03-08 DIAGNOSIS — E11.69 HYPERLIPIDEMIA ASSOCIATED WITH TYPE 2 DIABETES MELLITUS: ICD-10-CM

## 2023-03-08 DIAGNOSIS — Z00.00 ENCOUNTER FOR ANNUAL HEALTH EXAMINATION: Primary | ICD-10-CM

## 2023-03-08 DIAGNOSIS — E04.2 MULTINODULAR GOITER: ICD-10-CM

## 2023-03-08 DIAGNOSIS — E11.9 TYPE 2 DIABETES MELLITUS WITHOUT COMPLICATION: ICD-10-CM

## 2023-03-08 DIAGNOSIS — M79.672 LEFT FOOT PAIN: ICD-10-CM

## 2023-03-08 DIAGNOSIS — E78.5 HYPERLIPIDEMIA ASSOCIATED WITH TYPE 2 DIABETES MELLITUS: ICD-10-CM

## 2023-03-08 DIAGNOSIS — M48.061 SPINAL STENOSIS OF LUMBAR REGION, UNSPECIFIED WHETHER NEUROGENIC CLAUDICATION PRESENT: ICD-10-CM

## 2023-03-08 DIAGNOSIS — E66.09 CLASS 1 OBESITY DUE TO EXCESS CALORIES WITH SERIOUS COMORBIDITY AND BODY MASS INDEX (BMI) OF 33.0 TO 33.9 IN ADULT: ICD-10-CM

## 2023-03-08 DIAGNOSIS — Z23 NEED FOR VACCINATION: ICD-10-CM

## 2023-03-08 DIAGNOSIS — B37.31 VAGINAL CANDIDIASIS: ICD-10-CM

## 2023-03-08 PROBLEM — E66.811 CLASS 1 OBESITY DUE TO EXCESS CALORIES WITH SERIOUS COMORBIDITY AND BODY MASS INDEX (BMI) OF 33.0 TO 33.9 IN ADULT: Status: ACTIVE | Noted: 2021-01-26

## 2023-03-08 LAB
ALBUMIN SERPL BCP-MCNC: 3.9 G/DL (ref 3.5–5.2)
ALP SERPL-CCNC: 97 U/L (ref 55–135)
ALT SERPL W/O P-5'-P-CCNC: 19 U/L (ref 10–44)
ANION GAP SERPL CALC-SCNC: 12 MMOL/L (ref 8–16)
AST SERPL-CCNC: 26 U/L (ref 10–40)
BASOPHILS # BLD AUTO: 0.04 K/UL (ref 0–0.2)
BASOPHILS NFR BLD: 0.6 % (ref 0–1.9)
BILIRUB SERPL-MCNC: 0.6 MG/DL (ref 0.1–1)
BUN SERPL-MCNC: 14 MG/DL (ref 6–20)
CALCIUM SERPL-MCNC: 9.6 MG/DL (ref 8.7–10.5)
CHLORIDE SERPL-SCNC: 108 MMOL/L (ref 95–110)
CHOLEST SERPL-MCNC: 170 MG/DL (ref 120–199)
CHOLEST/HDLC SERPL: 3.5 {RATIO} (ref 2–5)
CO2 SERPL-SCNC: 20 MMOL/L (ref 23–29)
CREAT SERPL-MCNC: 0.7 MG/DL (ref 0.5–1.4)
DIFFERENTIAL METHOD: ABNORMAL
EOSINOPHIL # BLD AUTO: 0.1 K/UL (ref 0–0.5)
EOSINOPHIL NFR BLD: 1.9 % (ref 0–8)
ERYTHROCYTE [DISTWIDTH] IN BLOOD BY AUTOMATED COUNT: 12.9 % (ref 11.5–14.5)
EST. GFR  (NO RACE VARIABLE): >60 ML/MIN/1.73 M^2
ESTIMATED AVG GLUCOSE: 192 MG/DL (ref 68–131)
GLUCOSE SERPL-MCNC: 91 MG/DL (ref 70–110)
HBA1C MFR BLD: 8.3 % (ref 4–5.6)
HCT VFR BLD AUTO: 43.2 % (ref 37–48.5)
HDLC SERPL-MCNC: 49 MG/DL (ref 40–75)
HDLC SERPL: 28.8 % (ref 20–50)
HGB BLD-MCNC: 13.7 G/DL (ref 12–16)
IMM GRANULOCYTES # BLD AUTO: 0.01 K/UL (ref 0–0.04)
IMM GRANULOCYTES NFR BLD AUTO: 0.1 % (ref 0–0.5)
LDLC SERPL CALC-MCNC: 104 MG/DL (ref 63–159)
LYMPHOCYTES # BLD AUTO: 1.8 K/UL (ref 1–4.8)
LYMPHOCYTES NFR BLD: 26.3 % (ref 18–48)
MCH RBC QN AUTO: 28.2 PG (ref 27–31)
MCHC RBC AUTO-ENTMCNC: 31.7 G/DL (ref 32–36)
MCV RBC AUTO: 89 FL (ref 82–98)
MONOCYTES # BLD AUTO: 0.5 K/UL (ref 0.3–1)
MONOCYTES NFR BLD: 6.9 % (ref 4–15)
NEUTROPHILS # BLD AUTO: 4.4 K/UL (ref 1.8–7.7)
NEUTROPHILS NFR BLD: 64.2 % (ref 38–73)
NONHDLC SERPL-MCNC: 121 MG/DL
NRBC BLD-RTO: 0 /100 WBC
PLATELET # BLD AUTO: 255 K/UL (ref 150–450)
PMV BLD AUTO: 12 FL (ref 9.2–12.9)
POTASSIUM SERPL-SCNC: 4.8 MMOL/L (ref 3.5–5.1)
PROT SERPL-MCNC: 7.4 G/DL (ref 6–8.4)
RBC # BLD AUTO: 4.86 M/UL (ref 4–5.4)
SODIUM SERPL-SCNC: 140 MMOL/L (ref 136–145)
TRIGL SERPL-MCNC: 85 MG/DL (ref 30–150)
TSH SERPL DL<=0.005 MIU/L-ACNC: 1.43 UIU/ML (ref 0.4–4)
WBC # BLD AUTO: 6.8 K/UL (ref 3.9–12.7)

## 2023-03-08 PROCEDURE — 1159F PR MEDICATION LIST DOCUMENTED IN MEDICAL RECORD: ICD-10-PCS | Mod: CPTII,S$GLB,, | Performed by: NURSE PRACTITIONER

## 2023-03-08 PROCEDURE — 1160F PR REVIEW ALL MEDS BY PRESCRIBER/CLIN PHARMACIST DOCUMENTED: ICD-10-PCS | Mod: CPTII,S$GLB,, | Performed by: NURSE PRACTITIONER

## 2023-03-08 PROCEDURE — 99999 PR PBB SHADOW E&M-EST. PATIENT-LVL V: CPT | Mod: PBBFAC,,, | Performed by: NURSE PRACTITIONER

## 2023-03-08 PROCEDURE — 36415 COLL VENOUS BLD VENIPUNCTURE: CPT | Mod: PO | Performed by: HOSPITALIST

## 2023-03-08 PROCEDURE — 1159F MED LIST DOCD IN RCRD: CPT | Mod: CPTII,S$GLB,, | Performed by: NURSE PRACTITIONER

## 2023-03-08 PROCEDURE — 90471 FLU VACCINE (QUAD) GREATER THAN OR EQUAL TO 3YO PRESERVATIVE FREE IM: ICD-10-PCS | Mod: S$GLB,,, | Performed by: NURSE PRACTITIONER

## 2023-03-08 PROCEDURE — 90686 IIV4 VACC NO PRSV 0.5 ML IM: CPT | Mod: S$GLB,,, | Performed by: NURSE PRACTITIONER

## 2023-03-08 PROCEDURE — 90472 IMMUNIZATION ADMIN EACH ADD: CPT | Mod: S$GLB,,, | Performed by: NURSE PRACTITIONER

## 2023-03-08 PROCEDURE — 85025 COMPLETE CBC W/AUTO DIFF WBC: CPT | Performed by: NURSE PRACTITIONER

## 2023-03-08 PROCEDURE — 90677 PCV20 VACCINE IM: CPT | Mod: S$GLB,,, | Performed by: NURSE PRACTITIONER

## 2023-03-08 PROCEDURE — 3008F BODY MASS INDEX DOCD: CPT | Mod: CPTII,S$GLB,, | Performed by: NURSE PRACTITIONER

## 2023-03-08 PROCEDURE — 4010F PR ACE/ARB THEARPY RXD/TAKEN: ICD-10-PCS | Mod: CPTII,S$GLB,, | Performed by: NURSE PRACTITIONER

## 2023-03-08 PROCEDURE — 1160F RVW MEDS BY RX/DR IN RCRD: CPT | Mod: CPTII,S$GLB,, | Performed by: NURSE PRACTITIONER

## 2023-03-08 PROCEDURE — 90472 PNEUMOCOCCAL CONJUGATE VACCINE 20-VALENT: ICD-10-PCS | Mod: S$GLB,,, | Performed by: NURSE PRACTITIONER

## 2023-03-08 PROCEDURE — 80061 LIPID PANEL: CPT | Performed by: HOSPITALIST

## 2023-03-08 PROCEDURE — 90471 IMMUNIZATION ADMIN: CPT | Mod: S$GLB,,, | Performed by: NURSE PRACTITIONER

## 2023-03-08 PROCEDURE — 99396 PREV VISIT EST AGE 40-64: CPT | Mod: 25,S$GLB,, | Performed by: NURSE PRACTITIONER

## 2023-03-08 PROCEDURE — 90677 PNEUMOCOCCAL CONJUGATE VACCINE 20-VALENT: ICD-10-PCS | Mod: S$GLB,,, | Performed by: NURSE PRACTITIONER

## 2023-03-08 PROCEDURE — 4010F ACE/ARB THERAPY RXD/TAKEN: CPT | Mod: CPTII,S$GLB,, | Performed by: NURSE PRACTITIONER

## 2023-03-08 PROCEDURE — 3079F PR MOST RECENT DIASTOLIC BLOOD PRESSURE 80-89 MM HG: ICD-10-PCS | Mod: CPTII,S$GLB,, | Performed by: NURSE PRACTITIONER

## 2023-03-08 PROCEDURE — 3074F PR MOST RECENT SYSTOLIC BLOOD PRESSURE < 130 MM HG: ICD-10-PCS | Mod: CPTII,S$GLB,, | Performed by: NURSE PRACTITIONER

## 2023-03-08 PROCEDURE — 3079F DIAST BP 80-89 MM HG: CPT | Mod: CPTII,S$GLB,, | Performed by: NURSE PRACTITIONER

## 2023-03-08 PROCEDURE — 84443 ASSAY THYROID STIM HORMONE: CPT | Performed by: NURSE PRACTITIONER

## 2023-03-08 PROCEDURE — 3074F SYST BP LT 130 MM HG: CPT | Mod: CPTII,S$GLB,, | Performed by: NURSE PRACTITIONER

## 2023-03-08 PROCEDURE — 3008F PR BODY MASS INDEX (BMI) DOCUMENTED: ICD-10-PCS | Mod: CPTII,S$GLB,, | Performed by: NURSE PRACTITIONER

## 2023-03-08 PROCEDURE — 83036 HEMOGLOBIN GLYCOSYLATED A1C: CPT | Performed by: HOSPITALIST

## 2023-03-08 PROCEDURE — 80053 COMPREHEN METABOLIC PANEL: CPT | Performed by: NURSE PRACTITIONER

## 2023-03-08 PROCEDURE — 99396 PR PREVENTIVE VISIT,EST,40-64: ICD-10-PCS | Mod: 25,S$GLB,, | Performed by: NURSE PRACTITIONER

## 2023-03-08 PROCEDURE — 99999 PR PBB SHADOW E&M-EST. PATIENT-LVL V: ICD-10-PCS | Mod: PBBFAC,,, | Performed by: NURSE PRACTITIONER

## 2023-03-08 PROCEDURE — 90686 FLU VACCINE (QUAD) GREATER THAN OR EQUAL TO 3YO PRESERVATIVE FREE IM: ICD-10-PCS | Mod: S$GLB,,, | Performed by: NURSE PRACTITIONER

## 2023-03-08 RX ORDER — FLUCONAZOLE 150 MG/1
150 TABLET ORAL DAILY
Qty: 2 TABLET | Refills: 0 | Status: SHIPPED | OUTPATIENT
Start: 2023-03-08 | End: 2023-03-09

## 2023-03-08 RX ORDER — METFORMIN HYDROCHLORIDE 1000 MG/1
1000 TABLET ORAL 2 TIMES DAILY WITH MEALS
Qty: 180 TABLET | Refills: 3 | Status: SHIPPED | OUTPATIENT
Start: 2023-03-08 | End: 2024-03-13

## 2023-03-08 RX ORDER — PREDNISONE 20 MG/1
40 TABLET ORAL DAILY
Qty: 10 TABLET | Refills: 0 | Status: SHIPPED | OUTPATIENT
Start: 2023-03-08 | End: 2023-03-13

## 2023-03-08 RX ORDER — ZOSTER VACCINE RECOMBINANT, ADJUVANTED 50 MCG/0.5
0.5 KIT INTRAMUSCULAR ONCE
Qty: 1 EACH | Refills: 0 | Status: SHIPPED | OUTPATIENT
Start: 2023-03-08 | End: 2023-03-08

## 2023-03-08 RX ORDER — GABAPENTIN 300 MG/1
300 CAPSULE ORAL 3 TIMES DAILY
Qty: 270 CAPSULE | Refills: 3 | Status: SHIPPED | OUTPATIENT
Start: 2023-03-08

## 2023-03-08 RX ORDER — VALSARTAN 320 MG/1
320 TABLET ORAL DAILY
Qty: 90 TABLET | Refills: 3 | Status: SHIPPED | OUTPATIENT
Start: 2023-03-08 | End: 2024-03-13

## 2023-03-08 RX ORDER — INSULIN GLARGINE 100 [IU]/ML
30 INJECTION, SOLUTION SUBCUTANEOUS DAILY
Qty: 27 ML | Refills: 3 | Status: SHIPPED | OUTPATIENT
Start: 2023-03-08 | End: 2023-03-15 | Stop reason: SDUPTHER

## 2023-03-08 RX ORDER — AMLODIPINE BESYLATE 2.5 MG/1
2.5 TABLET ORAL DAILY
Qty: 90 TABLET | Refills: 3 | Status: SHIPPED | OUTPATIENT
Start: 2023-03-08 | End: 2023-08-15 | Stop reason: SDUPTHER

## 2023-03-08 RX ORDER — ATORVASTATIN CALCIUM 80 MG/1
80 TABLET, FILM COATED ORAL NIGHTLY
Qty: 90 TABLET | Refills: 3 | Status: SHIPPED | OUTPATIENT
Start: 2023-03-08 | End: 2024-03-13

## 2023-03-08 RX ORDER — SERTRALINE HYDROCHLORIDE 100 MG/1
100 TABLET, FILM COATED ORAL DAILY
Qty: 90 TABLET | Refills: 3 | Status: SHIPPED | OUTPATIENT
Start: 2023-03-08 | End: 2024-03-13

## 2023-03-15 DIAGNOSIS — E11.65 TYPE 2 DIABETES MELLITUS WITH HYPERGLYCEMIA, WITH LONG-TERM CURRENT USE OF INSULIN: ICD-10-CM

## 2023-03-15 DIAGNOSIS — Z79.4 TYPE 2 DIABETES MELLITUS WITH HYPERGLYCEMIA, WITH LONG-TERM CURRENT USE OF INSULIN: ICD-10-CM

## 2023-03-15 RX ORDER — INSULIN GLARGINE 300 U/ML
30 INJECTION, SOLUTION SUBCUTANEOUS NIGHTLY
Qty: 6 PEN | Refills: 3 | Status: SHIPPED | OUTPATIENT
Start: 2023-03-15 | End: 2023-10-24

## 2023-03-17 ENCOUNTER — TELEPHONE (OUTPATIENT)
Dept: INTERNAL MEDICINE | Facility: CLINIC | Age: 58
End: 2023-03-17
Payer: COMMERCIAL

## 2023-03-17 ENCOUNTER — PATIENT MESSAGE (OUTPATIENT)
Dept: INTERNAL MEDICINE | Facility: CLINIC | Age: 58
End: 2023-03-17
Payer: COMMERCIAL

## 2023-03-17 NOTE — TELEPHONE ENCOUNTER
Message noted; [employer] calling To verbalize decision of expedited appeal on insulin has been approved

## 2023-03-17 NOTE — TELEPHONE ENCOUNTER
----- Message from Mita Mesa sent at 3/17/2023  8:58 AM CDT -----  Contact: [phone] - [employer]  [employer] calling To verbalize decision of expedited appeal on insulin has been approved     [employer] will be faxing

## 2023-03-17 NOTE — TELEPHONE ENCOUNTER
[employer] and [facility]: Medical Appeals letter has been received today on 3/17/23.    Prior Authorization has been approved for Glargine Insulin. For the date span of 03/17/2023-3/17/2024

## 2023-05-24 ENCOUNTER — PATIENT MESSAGE (OUTPATIENT)
Dept: INTERNAL MEDICINE | Facility: CLINIC | Age: 58
End: 2023-05-24
Payer: COMMERCIAL

## 2023-06-15 ENCOUNTER — PATIENT OUTREACH (OUTPATIENT)
Dept: ADMINISTRATIVE | Facility: HOSPITAL | Age: 58
End: 2023-06-15
Payer: COMMERCIAL

## 2023-06-15 DIAGNOSIS — E11.9 TYPE 2 DIABETES MELLITUS WITHOUT COMPLICATION, WITHOUT LONG-TERM CURRENT USE OF INSULIN: Primary | ICD-10-CM

## 2023-06-15 NOTE — PROGRESS NOTES
Health Maintenance Due   Topic Date Due    Shingles Vaccine (1 of 2) Never done    COVID-19 Vaccine (2 - Booster for Steve series) 05/09/2021    Hemoglobin A1c  06/08/2023    Mammogram  08/02/2023     Chart reviewed.   Immunizations: Reconciled  Orders placed: Hemoglobin A1c  Upcoming appts to satisfy DANIEL topics: N/A

## 2023-06-27 ENCOUNTER — OFFICE VISIT (OUTPATIENT)
Dept: INTERNAL MEDICINE | Facility: CLINIC | Age: 58
End: 2023-06-27
Payer: COMMERCIAL

## 2023-06-27 VITALS
WEIGHT: 194.25 LBS | BODY MASS INDEX: 34.41 KG/M2 | SYSTOLIC BLOOD PRESSURE: 124 MMHG | TEMPERATURE: 97 F | RESPIRATION RATE: 14 BRPM | OXYGEN SATURATION: 96 % | DIASTOLIC BLOOD PRESSURE: 88 MMHG | HEART RATE: 94 BPM

## 2023-06-27 DIAGNOSIS — Z79.4 TYPE 2 DIABETES MELLITUS WITH HYPERGLYCEMIA, WITH LONG-TERM CURRENT USE OF INSULIN: Primary | ICD-10-CM

## 2023-06-27 DIAGNOSIS — R29.898 LEFT HAND WEAKNESS: ICD-10-CM

## 2023-06-27 DIAGNOSIS — E11.65 TYPE 2 DIABETES MELLITUS WITH HYPERGLYCEMIA, WITH LONG-TERM CURRENT USE OF INSULIN: Primary | ICD-10-CM

## 2023-06-27 DIAGNOSIS — E11.69 HYPERLIPIDEMIA ASSOCIATED WITH TYPE 2 DIABETES MELLITUS: ICD-10-CM

## 2023-06-27 DIAGNOSIS — Z12.31 ENCOUNTER FOR SCREENING MAMMOGRAM FOR BREAST CANCER: ICD-10-CM

## 2023-06-27 DIAGNOSIS — I63.411 EMBOLIC STROKE INVOLVING RIGHT MIDDLE CEREBRAL ARTERY: ICD-10-CM

## 2023-06-27 DIAGNOSIS — E78.5 HYPERLIPIDEMIA ASSOCIATED WITH TYPE 2 DIABETES MELLITUS: ICD-10-CM

## 2023-06-27 PROCEDURE — 4010F PR ACE/ARB THEARPY RXD/TAKEN: ICD-10-PCS | Mod: CPTII,S$GLB,, | Performed by: HOSPITALIST

## 2023-06-27 PROCEDURE — 3079F PR MOST RECENT DIASTOLIC BLOOD PRESSURE 80-89 MM HG: ICD-10-PCS | Mod: CPTII,S$GLB,, | Performed by: HOSPITALIST

## 2023-06-27 PROCEDURE — 3061F NEG MICROALBUMINURIA REV: CPT | Mod: CPTII,S$GLB,, | Performed by: HOSPITALIST

## 2023-06-27 PROCEDURE — 1159F PR MEDICATION LIST DOCUMENTED IN MEDICAL RECORD: ICD-10-PCS | Mod: CPTII,S$GLB,, | Performed by: HOSPITALIST

## 2023-06-27 PROCEDURE — 3008F BODY MASS INDEX DOCD: CPT | Mod: CPTII,S$GLB,, | Performed by: HOSPITALIST

## 2023-06-27 PROCEDURE — 3052F HG A1C>EQUAL 8.0%<EQUAL 9.0%: CPT | Mod: CPTII,S$GLB,, | Performed by: HOSPITALIST

## 2023-06-27 PROCEDURE — 3052F PR MOST RECENT HEMOGLOBIN A1C LEVEL 8.0 - < 9.0%: ICD-10-PCS | Mod: CPTII,S$GLB,, | Performed by: HOSPITALIST

## 2023-06-27 PROCEDURE — 1160F PR REVIEW ALL MEDS BY PRESCRIBER/CLIN PHARMACIST DOCUMENTED: ICD-10-PCS | Mod: CPTII,S$GLB,, | Performed by: HOSPITALIST

## 2023-06-27 PROCEDURE — 99214 OFFICE O/P EST MOD 30 MIN: CPT | Mod: S$GLB,,, | Performed by: HOSPITALIST

## 2023-06-27 PROCEDURE — 3061F PR NEG MICROALBUMINURIA RESULT DOCUMENTED/REVIEW: ICD-10-PCS | Mod: CPTII,S$GLB,, | Performed by: HOSPITALIST

## 2023-06-27 PROCEDURE — 3074F PR MOST RECENT SYSTOLIC BLOOD PRESSURE < 130 MM HG: ICD-10-PCS | Mod: CPTII,S$GLB,, | Performed by: HOSPITALIST

## 2023-06-27 PROCEDURE — 3066F PR DOCUMENTATION OF TREATMENT FOR NEPHROPATHY: ICD-10-PCS | Mod: CPTII,S$GLB,, | Performed by: HOSPITALIST

## 2023-06-27 PROCEDURE — 4010F ACE/ARB THERAPY RXD/TAKEN: CPT | Mod: CPTII,S$GLB,, | Performed by: HOSPITALIST

## 2023-06-27 PROCEDURE — 1160F RVW MEDS BY RX/DR IN RCRD: CPT | Mod: CPTII,S$GLB,, | Performed by: HOSPITALIST

## 2023-06-27 PROCEDURE — 3079F DIAST BP 80-89 MM HG: CPT | Mod: CPTII,S$GLB,, | Performed by: HOSPITALIST

## 2023-06-27 PROCEDURE — 1159F MED LIST DOCD IN RCRD: CPT | Mod: CPTII,S$GLB,, | Performed by: HOSPITALIST

## 2023-06-27 PROCEDURE — 3074F SYST BP LT 130 MM HG: CPT | Mod: CPTII,S$GLB,, | Performed by: HOSPITALIST

## 2023-06-27 PROCEDURE — 3008F PR BODY MASS INDEX (BMI) DOCUMENTED: ICD-10-PCS | Mod: CPTII,S$GLB,, | Performed by: HOSPITALIST

## 2023-06-27 PROCEDURE — 99999 PR PBB SHADOW E&M-EST. PATIENT-LVL V: ICD-10-PCS | Mod: PBBFAC,,, | Performed by: HOSPITALIST

## 2023-06-27 PROCEDURE — 99214 PR OFFICE/OUTPT VISIT, EST, LEVL IV, 30-39 MIN: ICD-10-PCS | Mod: S$GLB,,, | Performed by: HOSPITALIST

## 2023-06-27 PROCEDURE — 3066F NEPHROPATHY DOC TX: CPT | Mod: CPTII,S$GLB,, | Performed by: HOSPITALIST

## 2023-06-27 PROCEDURE — 99999 PR PBB SHADOW E&M-EST. PATIENT-LVL V: CPT | Mod: PBBFAC,,, | Performed by: HOSPITALIST

## 2023-06-27 NOTE — PROGRESS NOTES
Subjective:     @Patient ID: Rayne Aaron is a 58 y.o. female.    Chief Complaint: Follow-up and Diabetes    HPI      59 yo F  pmhx of CVA, dm2  presents for f/u. 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      1. DM2:  reports blood sugars have been elevated lately. Currently on trulicity 3 mg qweek and metformin 1000 mg bid . Last a1c 8.3.    2. HTN: amlodipine 2.5 mg and valsartan 320 mg daily    Review of Systems   Constitutional:  Negative for chills and fever.   HENT:  Negative for congestion and sore throat.    Eyes:  Negative for pain and visual disturbance.   Respiratory:  Negative for cough and shortness of breath.    Cardiovascular:  Negative for chest pain and leg swelling.   Gastrointestinal:  Negative for abdominal pain, nausea and vomiting.   Endocrine: Negative for polydipsia and polyuria.   Genitourinary:  Negative for difficulty urinating and dysuria.   Musculoskeletal:  Negative for arthralgias and back pain.   Skin:  Negative for rash and wound.   Neurological:  Positive for weakness (chronic L hand weakness). Negative for dizziness and headaches.   Psychiatric/Behavioral:  Negative for agitation and confusion.    Past medical history, surgical history, and family medical history reviewed and updated as appropriate.    Medications and allergies reviewed.     Objective:     Vitals:    06/27/23 1432   BP: 124/88   BP Location: Right arm   Patient Position: Sitting   BP Method: Large (Manual)   Pulse: 94   Resp: 14   Temp: 97.3 °F (36.3 °C)   TempSrc: Temporal   SpO2: 96%   Weight: 88.1 kg (194 lb 3.6 oz)     Body mass index is 34.41 kg/m².  Physical Exam  Constitutional:       Appearance: Normal appearance.   HENT:      Head: Normocephalic and atraumatic.   Eyes:      General:         Right eye: No discharge.         Left eye: No discharge.      Conjunctiva/sclera: Conjunctivae normal.   Cardiovascular:      Rate and Rhythm: Normal rate and regular rhythm.       Heart sounds: No murmur heard.  Pulmonary:      Effort: Pulmonary effort is normal.      Breath sounds: Normal breath sounds.   Abdominal:      General: Bowel sounds are normal. There is no distension.      Palpations: Abdomen is soft.      Tenderness: There is no abdominal tenderness.   Musculoskeletal:         General: Normal range of motion.      Cervical back: Normal range of motion and neck supple.      Right lower leg: No edema.      Left lower leg: No edema.   Skin:     General: Skin is warm and dry.   Neurological:      Mental Status: She is alert and oriented to person, place, and time.   Psychiatric:         Mood and Affect: Mood normal.         Behavior: Behavior normal.       Lab Results   Component Value Date    WBC 6.80 03/08/2023    HGB 13.7 03/08/2023    HCT 43.2 03/08/2023     03/08/2023    CHOL 170 03/08/2023    TRIG 85 03/08/2023    HDL 49 03/08/2023    ALT 19 03/08/2023    AST 26 03/08/2023     03/08/2023    K 4.8 03/08/2023     03/08/2023    CREATININE 0.7 03/08/2023    BUN 14 03/08/2023    CO2 20 (L) 03/08/2023    TSH 1.434 03/08/2023    INR 0.9 12/30/2020    HGBA1C 8.3 (H) 03/08/2023       Assessment:     1. Type 2 diabetes mellitus with hyperglycemia, with long-term current use of insulin    2. Hyperlipidemia associated with type 2 diabetes mellitus    3. Encounter for screening mammogram for breast cancer    4. Left hand weakness    5. History of embolic stroke involving right middle cerebral artery      Plan:   Rayne was seen today for follow-up and diabetes.    Diagnoses and all orders for this visit:    Type 2 diabetes mellitus with hyperglycemia, with long-term current use of insulin  - will check labs. Continue current meds   -     Hemoglobin A1C; Future  -     Comprehensive Metabolic Panel; Future  -     Lipid Panel; Future    Hyperlipidemia associated with type 2 diabetes mellitus  -     Lipid Panel; Future    Encounter for screening mammogram for breast cancer  -      Mammo Digital Screening Bilat w/ Mack; Future    Left hand weakness  History of embolic stroke involving right middle cerebral artery  - chronic. Continue home meds          Deb Ware MD  Internal Medicine    6/27/2023

## 2023-07-01 ENCOUNTER — LAB VISIT (OUTPATIENT)
Dept: LAB | Facility: HOSPITAL | Age: 58
End: 2023-07-01
Attending: HOSPITALIST
Payer: COMMERCIAL

## 2023-07-01 DIAGNOSIS — Z79.4 TYPE 2 DIABETES MELLITUS WITH HYPERGLYCEMIA, WITH LONG-TERM CURRENT USE OF INSULIN: ICD-10-CM

## 2023-07-01 DIAGNOSIS — E78.5 HYPERLIPIDEMIA ASSOCIATED WITH TYPE 2 DIABETES MELLITUS: ICD-10-CM

## 2023-07-01 DIAGNOSIS — E11.65 TYPE 2 DIABETES MELLITUS WITH HYPERGLYCEMIA, WITH LONG-TERM CURRENT USE OF INSULIN: ICD-10-CM

## 2023-07-01 DIAGNOSIS — E11.69 HYPERLIPIDEMIA ASSOCIATED WITH TYPE 2 DIABETES MELLITUS: ICD-10-CM

## 2023-07-01 LAB
ALBUMIN SERPL BCP-MCNC: 3.7 G/DL (ref 3.5–5.2)
ALP SERPL-CCNC: 95 U/L (ref 55–135)
ALT SERPL W/O P-5'-P-CCNC: 28 U/L (ref 10–44)
ANION GAP SERPL CALC-SCNC: 10 MMOL/L (ref 8–16)
AST SERPL-CCNC: 23 U/L (ref 10–40)
BILIRUB SERPL-MCNC: 0.4 MG/DL (ref 0.1–1)
BUN SERPL-MCNC: 13 MG/DL (ref 6–20)
CALCIUM SERPL-MCNC: 9.9 MG/DL (ref 8.7–10.5)
CHLORIDE SERPL-SCNC: 105 MMOL/L (ref 95–110)
CHOLEST SERPL-MCNC: 167 MG/DL (ref 120–199)
CHOLEST/HDLC SERPL: 3.6 {RATIO} (ref 2–5)
CO2 SERPL-SCNC: 25 MMOL/L (ref 23–29)
CREAT SERPL-MCNC: 0.8 MG/DL (ref 0.5–1.4)
EST. GFR  (NO RACE VARIABLE): >60 ML/MIN/1.73 M^2
ESTIMATED AVG GLUCOSE: 194 MG/DL (ref 68–131)
GLUCOSE SERPL-MCNC: 226 MG/DL (ref 70–110)
HBA1C MFR BLD: 8.4 % (ref 4–5.6)
HDLC SERPL-MCNC: 46 MG/DL (ref 40–75)
HDLC SERPL: 27.5 % (ref 20–50)
LDLC SERPL CALC-MCNC: 97.4 MG/DL (ref 63–159)
NONHDLC SERPL-MCNC: 121 MG/DL
POTASSIUM SERPL-SCNC: 4.9 MMOL/L (ref 3.5–5.1)
PROT SERPL-MCNC: 7 G/DL (ref 6–8.4)
SODIUM SERPL-SCNC: 140 MMOL/L (ref 136–145)
TRIGL SERPL-MCNC: 118 MG/DL (ref 30–150)

## 2023-07-01 PROCEDURE — 36415 COLL VENOUS BLD VENIPUNCTURE: CPT | Mod: PO | Performed by: HOSPITALIST

## 2023-07-01 PROCEDURE — 80053 COMPREHEN METABOLIC PANEL: CPT | Performed by: HOSPITALIST

## 2023-07-01 PROCEDURE — 83036 HEMOGLOBIN GLYCOSYLATED A1C: CPT | Performed by: HOSPITALIST

## 2023-07-01 PROCEDURE — 80061 LIPID PANEL: CPT | Performed by: HOSPITALIST

## 2023-07-06 ENCOUNTER — OFFICE VISIT (OUTPATIENT)
Dept: INTERNAL MEDICINE | Facility: CLINIC | Age: 58
End: 2023-07-06
Payer: COMMERCIAL

## 2023-07-06 VITALS
TEMPERATURE: 97 F | HEART RATE: 71 BPM | DIASTOLIC BLOOD PRESSURE: 80 MMHG | WEIGHT: 190.69 LBS | RESPIRATION RATE: 15 BRPM | SYSTOLIC BLOOD PRESSURE: 120 MMHG | HEIGHT: 63 IN | BODY MASS INDEX: 33.79 KG/M2 | OXYGEN SATURATION: 92 %

## 2023-07-06 DIAGNOSIS — E78.5 HYPERLIPIDEMIA ASSOCIATED WITH TYPE 2 DIABETES MELLITUS: ICD-10-CM

## 2023-07-06 DIAGNOSIS — E11.59 HYPERTENSION ASSOCIATED WITH DIABETES: ICD-10-CM

## 2023-07-06 DIAGNOSIS — E11.65 TYPE 2 DIABETES MELLITUS WITH HYPERGLYCEMIA, WITH LONG-TERM CURRENT USE OF INSULIN: Primary | ICD-10-CM

## 2023-07-06 DIAGNOSIS — E11.69 HYPERLIPIDEMIA ASSOCIATED WITH TYPE 2 DIABETES MELLITUS: ICD-10-CM

## 2023-07-06 DIAGNOSIS — Z78.9 IMPAIRED INSTRUMENTAL ACTIVITIES OF DAILY LIVING (IADL): ICD-10-CM

## 2023-07-06 DIAGNOSIS — E66.09 CLASS 1 OBESITY DUE TO EXCESS CALORIES WITH SERIOUS COMORBIDITY AND BODY MASS INDEX (BMI) OF 33.0 TO 33.9 IN ADULT: ICD-10-CM

## 2023-07-06 DIAGNOSIS — Z79.4 TYPE 2 DIABETES MELLITUS WITH HYPERGLYCEMIA, WITH LONG-TERM CURRENT USE OF INSULIN: Primary | ICD-10-CM

## 2023-07-06 DIAGNOSIS — I15.2 HYPERTENSION ASSOCIATED WITH DIABETES: ICD-10-CM

## 2023-07-06 PROCEDURE — 3052F PR MOST RECENT HEMOGLOBIN A1C LEVEL 8.0 - < 9.0%: ICD-10-PCS | Mod: CPTII,S$GLB,, | Performed by: NURSE PRACTITIONER

## 2023-07-06 PROCEDURE — 95251 CONT GLUC MNTR ANALYSIS I&R: CPT | Mod: S$GLB,,, | Performed by: NURSE PRACTITIONER

## 2023-07-06 PROCEDURE — 3074F PR MOST RECENT SYSTOLIC BLOOD PRESSURE < 130 MM HG: ICD-10-PCS | Mod: CPTII,S$GLB,, | Performed by: NURSE PRACTITIONER

## 2023-07-06 PROCEDURE — 3066F NEPHROPATHY DOC TX: CPT | Mod: CPTII,S$GLB,, | Performed by: NURSE PRACTITIONER

## 2023-07-06 PROCEDURE — 99999 PR PBB SHADOW E&M-EST. PATIENT-LVL V: CPT | Mod: PBBFAC,,, | Performed by: NURSE PRACTITIONER

## 2023-07-06 PROCEDURE — 3061F NEG MICROALBUMINURIA REV: CPT | Mod: CPTII,S$GLB,, | Performed by: NURSE PRACTITIONER

## 2023-07-06 PROCEDURE — 1160F RVW MEDS BY RX/DR IN RCRD: CPT | Mod: CPTII,S$GLB,, | Performed by: NURSE PRACTITIONER

## 2023-07-06 PROCEDURE — 99214 PR OFFICE/OUTPT VISIT, EST, LEVL IV, 30-39 MIN: ICD-10-PCS | Mod: S$GLB,,, | Performed by: NURSE PRACTITIONER

## 2023-07-06 PROCEDURE — 3074F SYST BP LT 130 MM HG: CPT | Mod: CPTII,S$GLB,, | Performed by: NURSE PRACTITIONER

## 2023-07-06 PROCEDURE — 3052F HG A1C>EQUAL 8.0%<EQUAL 9.0%: CPT | Mod: CPTII,S$GLB,, | Performed by: NURSE PRACTITIONER

## 2023-07-06 PROCEDURE — 3061F PR NEG MICROALBUMINURIA RESULT DOCUMENTED/REVIEW: ICD-10-PCS | Mod: CPTII,S$GLB,, | Performed by: NURSE PRACTITIONER

## 2023-07-06 PROCEDURE — 3079F PR MOST RECENT DIASTOLIC BLOOD PRESSURE 80-89 MM HG: ICD-10-PCS | Mod: CPTII,S$GLB,, | Performed by: NURSE PRACTITIONER

## 2023-07-06 PROCEDURE — 3008F BODY MASS INDEX DOCD: CPT | Mod: CPTII,S$GLB,, | Performed by: NURSE PRACTITIONER

## 2023-07-06 PROCEDURE — 4010F ACE/ARB THERAPY RXD/TAKEN: CPT | Mod: CPTII,S$GLB,, | Performed by: NURSE PRACTITIONER

## 2023-07-06 PROCEDURE — 3079F DIAST BP 80-89 MM HG: CPT | Mod: CPTII,S$GLB,, | Performed by: NURSE PRACTITIONER

## 2023-07-06 PROCEDURE — 1159F PR MEDICATION LIST DOCUMENTED IN MEDICAL RECORD: ICD-10-PCS | Mod: CPTII,S$GLB,, | Performed by: NURSE PRACTITIONER

## 2023-07-06 PROCEDURE — 99214 OFFICE O/P EST MOD 30 MIN: CPT | Mod: S$GLB,,, | Performed by: NURSE PRACTITIONER

## 2023-07-06 PROCEDURE — 4010F PR ACE/ARB THEARPY RXD/TAKEN: ICD-10-PCS | Mod: CPTII,S$GLB,, | Performed by: NURSE PRACTITIONER

## 2023-07-06 PROCEDURE — 3008F PR BODY MASS INDEX (BMI) DOCUMENTED: ICD-10-PCS | Mod: CPTII,S$GLB,, | Performed by: NURSE PRACTITIONER

## 2023-07-06 PROCEDURE — 95251 PR GLUCOSE MONITOR, 72 HOUR, PHYS INTERP: ICD-10-PCS | Mod: S$GLB,,, | Performed by: NURSE PRACTITIONER

## 2023-07-06 PROCEDURE — 1159F MED LIST DOCD IN RCRD: CPT | Mod: CPTII,S$GLB,, | Performed by: NURSE PRACTITIONER

## 2023-07-06 PROCEDURE — 3066F PR DOCUMENTATION OF TREATMENT FOR NEPHROPATHY: ICD-10-PCS | Mod: CPTII,S$GLB,, | Performed by: NURSE PRACTITIONER

## 2023-07-06 PROCEDURE — 1160F PR REVIEW ALL MEDS BY PRESCRIBER/CLIN PHARMACIST DOCUMENTED: ICD-10-PCS | Mod: CPTII,S$GLB,, | Performed by: NURSE PRACTITIONER

## 2023-07-06 PROCEDURE — 99999 PR PBB SHADOW E&M-EST. PATIENT-LVL V: ICD-10-PCS | Mod: PBBFAC,,, | Performed by: NURSE PRACTITIONER

## 2023-07-06 RX ORDER — INSULIN PMP CART,AUT,G6/7,CNTR
1 EACH SUBCUTANEOUS DAILY
Qty: 1 EACH | Refills: 0 | Status: SHIPPED | OUTPATIENT
Start: 2023-07-06 | End: 2023-07-06

## 2023-07-06 RX ORDER — INSULIN ASPART INJECTION 100 [IU]/ML
70 INJECTION, SOLUTION SUBCUTANEOUS CONTINUOUS
Qty: 20 ML | Refills: 11 | Status: SHIPPED | OUTPATIENT
Start: 2023-07-06 | End: 2024-02-06

## 2023-07-06 RX ORDER — INSULIN PMP CART,AUT,G6/7,CNTR
1 EACH SUBCUTANEOUS EVERY OTHER DAY
Qty: 15 EACH | Refills: 11 | Status: SHIPPED | OUTPATIENT
Start: 2023-07-06 | End: 2023-07-06

## 2023-07-06 RX ORDER — INSULIN PMP CART,AUT,G6/7,CNTR
1 EACH SUBCUTANEOUS EVERY OTHER DAY
Qty: 15 EACH | Refills: 11 | Status: SHIPPED | OUTPATIENT
Start: 2023-07-06 | End: 2023-10-24

## 2023-07-06 RX ORDER — TIRZEPATIDE 5 MG/.5ML
5 INJECTION, SOLUTION SUBCUTANEOUS
Qty: 4 PEN | Refills: 3 | Status: SHIPPED | OUTPATIENT
Start: 2023-07-06 | End: 2023-08-15

## 2023-07-06 RX ORDER — BLOOD-GLUCOSE SENSOR
1 EACH MISCELLANEOUS
Qty: 3 EACH | Refills: 11 | Status: SHIPPED | OUTPATIENT
Start: 2023-07-06 | End: 2024-02-06

## 2023-07-06 RX ORDER — INSULIN PMP CART,AUT,G6/7,CNTR
1 EACH SUBCUTANEOUS CONTINUOUS
Qty: 1 EACH | Refills: 0 | Status: SHIPPED | OUTPATIENT
Start: 2023-07-06 | End: 2023-10-24

## 2023-07-06 NOTE — PROGRESS NOTES
58 y.o. female here for routine follow up for management of T2dm -   Last seen in 2022 - those notes below.     A1c up again trending higher at 8.4%  (Was 7.5% last year and then 6.4% last year on insulin pump therapy).  Continues on metformin 1000 mg twice per day -   Continues on insulin - long acting - Tresiba 30 units every morning.   (Prior was on omnipod classic and we tried vgo prior to that).  Was on MDI prior to that even...    She liked having omnipod and did well on it - we tried to get dash approved, but never was successful.   She is on trulicity still 3mg every week - tolerated well.   We tried farxiga last year - helped a lot, however terrible yeast infection - so stopped taking it.   Admits not eating well.   Wants to get back on track.     Continues on sami 14 day - is scanning 1 - 2 times per day on average - having to pay cash for cgm sensors -   See scanned in  - pays for sensors at the pharmacy - cash pay.   (She's only scanning/checking 1 - 2 times per day at most).   Average glucose 212 -   26% glucose only time in range.   54% highs.   20% very highs.   0% low blood sugars.     Still working full time.   Not exercising.     Last visit notes from 2022 as follow   57 y.o. female, here for 6 month follow up for management of T2dm   Last seen in February 2022 - those notes are below.     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   She has new insurance - but not sure omnipod insulin pump is covered on her current plan -   She was on on omnipod classic prior visits.   vgo 20 prior to that.   The meal time insulin really seemed to help.     Stopped using omnipod when she ran out of pods - around April/may 2022 - she called me to notify me.   We tried to get omnipod dash covered  without success.  I switched her to long acting insulin once per day at that point as she really wasn't needing much insulin (around 23 units/day).   Right now she is taking Tresiba 30 units once every morning.   Also on trulicity 3 mg SC every week - takes on thursdays -   Continues metformin 1000 mg twice per day.   We had not tried an sglt2i yet - she had been concerned about cost.   She has good renal function. And no CI's to this drug class.     Admits lately not following ADA diet.   Has gained some weight. But she is motivated to lose weight again.   She'd like to get off of insulin if possible.     Free style sami - personal.   Downloaded and reviewed today -   See scanned in .   Average glucose 161   70% in target range.   28% highs.   2% extreme highs.   No lows.       Last visit notes as follows from February 2022   57 y.o. female, here for routine 3 month follow up visit - for management of T2dm -   Last seen in November 2021 - those notes are below.     a1c is decreased from 6.9% to 6.4%.   Reports she has gained weight from holiday times - wasn't eating that great.   However is trying to restart better habits.   History of stroke (right MCA) -  impaired mobility at times - however she is back to work now.     Continues on metformin 1000 bid.    trulicity 3mg every week.   Had tried to add her on sglt2i in past, but it was too expensive and she really isn't privy to adding more medications at this time.   Switched her from vgo to omnipod - she is on older version (not dash) - gets through dme.   Was on novolog and now needs humalog rx today - her insurance will only cover.     omnipod downloaded - on glooko today -   Total daily dose is 23.2 units/day. She is needing much less insulin.   69% of insulin is coming from basal rate.   31% of insulin is coming from boluses.   Using pre-set boluses -   Basal rate is at 0.7 units/hour.   Active insulin time is set at 4 hours.  ISF set at 0 - does  "preset boluses - 4 or 6 units.   2 units for a snack.     On free style sami - downloaded and reviewed today -   Average glucose is 145   a1c estimated @ 6.8%   81% time in range.   0% lows.   17% highs.   2% extreme highs.     History of hyperthyroidism borderline in the past - her tft's have been stable since -   Her tsh today is slightly elevated at 4.588 - reports she is feeling sometimes fatigued.   Sometimes having night sweats, and has also gained weight over the past few months.   She is on zoloft - had dose increased and this has helped.       Last visit notes as follows from 11/2021:   56 y.o. female, here for follow up visit for management of T2dm -   Last seen 7/8/21 - those notes are below.     a1c more controlled @ 6.9%.   Trulicity 3 mg every week. No side effects (we had just increased her from the 1.5mg to 3mg every week).   Also on metformin 1000 mg twice per day.   On vgo 20 - 3 clicks with meal for most part - sometimes gives extra click for a "correction" if the bg is > 180 -   1 click with snack on occasion.   Using [facility]  - she met with diabetic educator -   She just downloaded it on her phone today - forgot her reader.   So not much data.   Average bg is 147 -   100% tir - see report scanned in .     Reports that she does have some lower sugar readings over night - sometimes 70 - 80's   But most of the time 100 - 130's during the day.   Not exercising a lot - has some back issues/so prohibited a bit from this.   Continuing on gabapentin which helps with her neuropathy in legs.   Also taking diclofenac 2 times per day which helps.   She is trying to get more active and is walking her dogs (she has 3 of them ) 3 times per day for walks.       Last visit notes as follows from 7/8/2021:   56 y.o. female, here for 3 month follow up visit for management of T2dm   Last seen April 2021 - those notes below.     a1c now improved @ 7.1%.   She is on metformin 1000 bid.   trulicity 1.5mg every week " "  And vgo 20 -     Continues on freestyle sami personal cgm -   Downloaded and reviewed today - see scanned in . -   Average bg is 143 -   a1c estimated at 6.7%.   85% TIR  0% lows.   1% very lows - 1 occasion.   13% highs.   1% extreme highs.         Last visit notes as follows from April 2021:  56 y.o. female here for follow up visit for T2dm, last seen 2/25/2021 - thos notes are blow.     a1c was last @ 13.7% and is now estimated at 6.2% on her cgm.   Labs reveal a1c currently is at 7.9%.   She has great trends. Feeling better. She is extremely happy with her results and quick turn around improvement.   Eating healthier, history of a stroke, but very motivated and refuses to 'be sick again".     Patient continues on metformin 1000 mg twice per day.   Also on trulicity 1.5mg every week.   In the interim, I switched her from MDI to vgo 20 - doing 3 clicks with meals tid.   Occasional clicks with snacks.   Met with tuyet sanchez and trained on vgo 3/5/2021 -     Had tried to start her on farxiga or jardiance, but too expensive on her plan,   And prefers to stay on same regimen.     Free style sami - interpreted today and see scanned in to .   Average glucose is 119.   a1c estimated at 6.2%.   91% time in range.   3% lows (happening after meal time boluses and over night).   6%  Highs.       Last OV notes as follows:   56 y.o. female, here for 6 week follow up for T2dm management.   Last seen 1/26/2021 - those notes below.     a1c had been at 13.7%, but she had stopped her mealt kasia insulin and her glp1 before seeing me.   Continues on metformin 1000mg twice per day.   She is now on trulicity 1.5mg every week on fridays. Tolerating well - she's had some weight loss - 5 lbs since last visit.   Feels it does suppress appetite somewhat.   Continues on Levemir 30 units every night now. (was on lower amount 25 units).   She is back on novolog prior to meals - tid - 6 units. Reports it is difficult " however to remember meal time boluses.   She is on injections 4 times per day. titrates her insulin based on self testing results.   She if finger sticking. dexcom was approved. But cost is high on her current insurance plan as she hasn't met her deductible yet.   States sugars are improved - no logs with her today -   States sugars fasting are running 170 - 200's now, so improved from 300's from past visit.   No acute complaints today's visit.       Last visit notes from 1/26/2021 as follows:   HPI: Rayne Aaron is a 58 y.o.  female c/I for visit to address Diabetes Type 2  This is the first time I am seeing her for care, follows with Dr. Deb Ware MD for primary care needs.   She has seen Donna George NP in the past for management for her diabetes. At [facility].   She has moved closer to [facility], so wanted to switch providers.     was diagnosed with T2DM in 2009.    Her father, paternal grandparents and brothers and sisters all have T2dm. (9 siblings total)  Denies missing doses of DM medication.   Has been on and off medications through the years -  was on insulin, then controlled and taken off.   Also reports stopping/ran out last year in 2019.     Now has restarted her insulin over the past month following a stroke.   Most recently was diagnosed with stroke in December 2020.   No deficits. She has recovered and is back working at home.   She is motivated to get her diabetes under control and prevent further progression of her diabetes.   She does not want another stroke.     Denies any other complications from diabetes   Denies nephropathy.   Denies Diabetic retinopathy.   + HTN, + HLD.   + stroke - now on aspirin, plavix and statin.     Last saw Donna George NP in 4/2019 -   Her a1c was > 14% at that time. And she had been off of her insulin at that time for about 3 months.   Current a1c is still high at 13.7%.   Had been ordered a vgo patch, but patient states she is not sure that it was  ever approved.   She also had wanted to be on a cgm to avoid the frequent fingersticks, but states her insurance company never approved.      Current regimen:  Levemir 26 U HS  (previously on novolog insulin 8 units with meals - but ran out of rx. Hospital did not restart).   Metformin 1000 mg BID  Was on trulicity weekly and ozempic at one point weekly - but ran out, then insurance stopped approving.   Denies any known side effects to GLP1's.     She is checking her glucoses 4 times per day.   Reports history of hypoglycemic episodes and unawareness.   No bg logs - but reports sugars range from 250 - 350's overall.       Past medical History:   Past Medical History:   Diagnosis Date    Diabetes mellitus, type 2     Hyperlipidemia     Hypertension     Multinodular goiter 10/21/2015    Stroke     Subclinical hyperthyroidism 10/21/2015      Family hx:   Family History   Problem Relation Age of Onset    Dementia Mother     Heart disease Mother     Hypertension Mother     Heart disease Father     Diabetes Father     Diabetes Maternal Grandmother     Hypertension Maternal Grandmother     Ovarian cancer Maternal Grandmother     Depression Maternal Grandfather     Diabetes Paternal Grandmother     Peripheral vascular disease Paternal Grandmother     Hyperthyroidism Sister     Breast cancer Neg Hx     Colon cancer Neg Hx     Thyroid cancer Neg Hx       Current meds:   Current Outpatient Medications:     amLODIPine (NORVASC) 2.5 MG tablet, Take 1 tablet (2.5 mg total) by mouth once daily., Disp: 90 tablet, Rfl: 3    aspirin (ECOTRIN) 81 MG EC tablet, Take 1 tablet (81 mg total) by mouth once daily., Disp: 180 tablet, Rfl: 2    atorvastatin (LIPITOR) 80 MG tablet, Take 1 tablet (80 mg total) by mouth every evening., Disp: 90 tablet, Rfl: 3    blood-glucose sensor (DEXCOM G7 SENSOR) Misty, 1 each by Misc.(Non-Drug; Combo Route) route every 10 days., Disp: 3 each, Rfl: 11    flash glucose sensor (FREESTYLE ANA 14 DAY SENSOR)  "Kit, APPLY TO SKIN AS DIRECTED AND CHANGE SENSOR EVERY 14 DAYS, Disp: 2 kit, Rfl: 11    gabapentin (NEURONTIN) 300 MG capsule, Take 1 capsule (300 mg total) by mouth 3 (three) times daily., Disp: 270 capsule, Rfl: 3    insulin aspart, niacinamide, (FIASP U-100 INSULIN) 100 unit/mL Soln, Inject 70 Units into the skin continuous. Use as directed with omnipod dash insulin pump, Disp: 20 mL, Rfl: 11    insulin glargine, TOUJEO, (TOUJEO SOLOSTAR U-300 INSULIN) 300 unit/mL (1.5 mL) InPn pen, Inject 30 Units into the skin every evening., Disp: 6 pen, Rfl: 3    insulin pump cart,automated,BT (OMNIPOD 5 G6 PODS, GEN 5,) Crtg, Inject 1 each into the skin every other day. Change pod every 2 days, Disp: 15 each, Rfl: 11    insulin pump cart,auto,BT-cntr (OMNIPOD 5 G6 INTRO KIT, GEN 5,) Crtg, 1 each by Misc.(Non-Drug; Combo Route) route continuous., Disp: 1 each, Rfl: 0    metFORMIN (GLUCOPHAGE) 1000 MG tablet, Take 1 tablet (1,000 mg total) by mouth 2 (two) times daily with meals., Disp: 180 tablet, Rfl: 3    pen needle, diabetic 32 gauge x 5/32" Ndle, Uses 3 times a day., Disp: 100 each, Rfl: 6    sertraline (ZOLOFT) 100 MG tablet, Take 1 tablet (100 mg total) by mouth once daily., Disp: 90 tablet, Rfl: 3    tirzepatide (MOUNJARO) 5 mg/0.5 mL PnIj, Inject 5 mg into the skin every 7 days., Disp: 4 pen , Rfl: 3    valsartan (DIOVAN) 320 MG tablet, Take 1 tablet (320 mg total) by mouth once daily., Disp: 90 tablet, Rfl: 3     Current Diabetes medications:    Metformin 1000 mg po bid with food  trulicity 3 mg sc weekly.   Tresiba 30 units every morning     Medications Tried and Failed:   Had been on trulicity in past   ozempic in past.    Long acting insulin:  Levemir 30 units every HS.   Short acting insulin: novolog 6 units tid ac meals.  Was on VGO in past. Now switched to omnipod (insurance coverage).   novolog insulin via Omnipod pump (formerly on vgo).   Farxiga 5mg po daily - bad yeast infections -     Review of Pertinent " "co-morbidities/risk factors:   CV: Denies history of MI. + stroke.   CAD: Denies.  Takes aspirin 81mg tablet daily and plavix also.   BP: has history of HTN  Statin: Taking  ACE/ARB: Taking    Social History     Tobacco Use   Smoking Status Never   Smokeless Tobacco Never     Social:   Lives at home with her son.   Life changes/stressors currently:  passed away in October 2020 - so has been stressed since then.   Just started new job in December.   Diet: not always following ADA diet   Meals: 3 per day and snacks.        Breakfast - 1/2 of a muffin or banana       Lunch - subway, sandwich at home. Veggies.        Dinner - fish, smoked brisquet, steak. Potatoes.        Snacks - sugar free chocolate pudding. Yogurt, mixed fruit        Drinks - diet pepsi, diet coke, water. Flavored arredondo  Exercise: walking dogs.   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   Was working from Home. .     Glucose Monitoring:   Personal free style sami CGM  Previoulsy, was Checking sugars 4x/day by fingerstick.   Gets supplies from pharmacy.   Was getting Omnipods (older version - classic ) - mailed to her - through dme - she is not sure which company.     Standards of care:   Eyes: .: 09/22/2022  Foot exam: : 03/08/2023   Diabetes education:  Yes - February 2021.    Vital Signs  /80 (BP Location: Right arm, Patient Position: Sitting, BP Method: Large (Manual))   Pulse 71   Temp 97.2 °F (36.2 °C) (Temporal)   Resp 15   Ht 5' 3" (1.6 m)   Wt 86.5 kg (190 lb 11.2 oz)   LMP 09/08/2015 (Approximate)   SpO2 (!) 92%   BMI 33.78 kg/m²     Pertinent Labs:   Hgba1c   Lab Results   Component Value Date    HGBA1C 8.4 (H) 07/01/2023    HGBA1C 8.3 (H) 03/08/2023    HGBA1C 7.5 (H) 08/03/2022     Lipid panel   Lab Results   Component Value Date    CHOL 167 07/01/2023    CHOL 170 03/08/2023    CHOL 230 (H) 02/04/2022     Lab Results   Component Value Date    HDL " 46 07/01/2023    HDL 49 03/08/2023    HDL 50 02/04/2022     Lab Results   Component Value Date    LDLCALC 97.4 07/01/2023    LDLCALC 104.0 03/08/2023    LDLCALC 143.8 02/04/2022     Lab Results   Component Value Date    TRIG 118 07/01/2023    TRIG 85 03/08/2023    TRIG 181 (H) 02/04/2022     Lab Results   Component Value Date    CHOLHDL 27.5 07/01/2023    CHOLHDL 28.8 03/08/2023    CHOLHDL 21.7 02/04/2022      CMP  Glucose   Date Value Ref Range Status   07/01/2023 226 (H) 70 - 110 mg/dL Final     BUN   Date Value Ref Range Status   07/01/2023 13 6 - 20 mg/dL Final     Creatinine   Date Value Ref Range Status   07/01/2023 0.8 0.5 - 1.4 mg/dL Final     eGFR if    Date Value Ref Range Status   02/04/2022 >60.0 >60 mL/min/1.73 m^2 Final     eGFR if non    Date Value Ref Range Status   02/04/2022 >60.0 >60 mL/min/1.73 m^2 Final     Comment:     Calculation used to obtain the estimated glomerular filtration  rate (eGFR) is the CKD-EPI equation.        AST   Date Value Ref Range Status   07/01/2023 23 10 - 40 U/L Final     ALT   Date Value Ref Range Status   07/01/2023 28 10 - 44 U/L Final     Microalbumin creatinine ratio:   Lab Results   Component Value Date    MICALBCREAT 10.9 03/08/2023       Review Of Systems:   Gen: Appetite good, 5 lbs weight gain, + fatigue and weakness. Denies polydipsia.  Skin: Skin is intact and heals well, denies any rashes or hair changes.   EENT: Denies any acute visual disturbances, nor blurred vision.    Resp: Denies SOB or Dyspnea on exertion, denies cough.   Cardiac: Denies chest pain, palpitations, or swelling.   GI: Denies abdominal pain, nausea or vomiting, diarrhea, or constipation.   /GYN: Denies nocturia, nor burning, frequency or pain on urination.  MS/Neuro: + numbness/ tingling in BLE; some chronic pain in lower back and legs, history of sciatica - nothing recent.   Gait steady, speech clear  Psych: Denies drug/ETOH abuse, + hx of depression -  is on zoloft.  Other systems: negative.    Physical Exam:   GENERAL: Well developed, well nourished in appearance.   PSYCH: AAOx3, appropriate mood and affect, pleasant expression, conversant, appears relaxed, well groomed.   EYES: PERRL, Conjunctiva and corneas clear  NECK: Soft and Supple, trachea midline  CHEST: Even, regular, and unlabored respirations,  ABDOMEN: Soft, non-tender, non-distended.    VASCULAR: pedal pulses palpable bilaterally, no edema.  NEURO:  cranial nerves II - XII intact   MUSCULOSKELETAL: Good ROM, steady gait.   SKIN: Skin warm, dry, and intact     Assessment and Plan of Care:     Rayne was seen today for follow-up.    Diagnoses and all orders for this visit:    Type 2 diabetes mellitus with hyperglycemia, with long-term current use of insulin  -     blood-glucose sensor (DEXCOM G7 SENSOR) Misty; 1 each by Misc.(Non-Drug; Combo Route) route every 10 days.  -     tirzepatide (MOUNJARO) 5 mg/0.5 mL PnIj; Inject 5 mg into the skin every 7 days.  -     Ambulatory referral/consult to Diabetes Education; Future  -     Discontinue: insulin pump cart,automated,BT (OMNIPOD 5 G6 PODS, GEN 5,) Crtg; Inject 1 each into the skin every other day. Change pod every 2 days.  -     Discontinue: insulin pump cart,auto,BT-cntr (OMNIPOD 5 G6 INTRO KIT, GEN 5,) Crtg; 1 each by Misc.(Non-Drug; Combo Route) route once daily.  -     insulin aspart, niacinamide, (FIASP U-100 INSULIN) 100 unit/mL Soln; Inject 70 Units into the skin continuous. Use as directed with omnipod dash insulin pump  -     insulin pump cart,automated,BT (OMNIPOD 5 G6 PODS, GEN 5,) Crtg; Inject 1 each into the skin every other day. Change pod every 2 days  -     insulin pump cart,auto,BT-cntr (OMNIPOD 5 G6 INTRO KIT, GEN 5,) Crtg; 1 each by Misc.(Non-Drug; Combo Route) route continuous.    Hyperlipidemia associated with type 2 diabetes mellitus    Hypertension associated with diabetes    Class 1 obesity due to excess calories with serious  comorbidity and body mass index (BMI) of 33.0 to 33.9 in adult    Impaired instrumental activities of daily living (IADL)         1. T2DM with hyperglycemia- Hgba1c goal is 7.5% or less without hypoglycemia - 8.4%--- > 14%--> 13.7%.---> 7.9% --> 7.1% --> 6.9%--> 6.4%-- up to 8.4% (average bg is 200 - 250's on cgm currently - she needs changes.   discussed DM, progression of disease, long term complications, CV risk factors and tx options.     Continue metformin 1000 bid.   Continue Tresiba 30 units every morning. Arrange to switch back to pump therapy.   Will resubmit for omnipod dash - she did great on omnipod classic in past. Settings back to original - see below -   DE consulted.   Continue glp1 - add the gip1 - switch from trulicity 3mg every week to mounjaro 5mg every week.   Farxiga gave her yeast infection - so will not try again yet...   No known history of pancreatitis or medullary thyroid cancer.   If you experience severe abdominal pain, nausea, or diarrhea - please call me or notify my office immediately.   Prefers to keep same regimen at present.   Advise compliance with ADA diet and encourage exercise- gave list.   Reviewed  hypoglycemia, s/s and appropriate tx. Have/get quick acting glucose tablets at hand.   Instructed to monitor Blood glucose 2 - 4x/day and bring meter/ log to every clinic visit.   Continue cgm therapy - switch sami 14 day to dexcom g7 A CGM is MEDICALLY NECESSARY as it will allow me to virtually and more closely monitor glucose readings  This enables me to make any necessary adjustments to the patients diabetes regimen while keeping the patient and staff safe during the COVID-19 PHE.    2. HTN controlled for most part - continue meds as previously prescribed and monitor.   diovan 320mg tablet daily. norvasc 2.5 mg daily - refilled at prior visit.   Urine mac negative.     3. HLD- LDL goal < 100. at goal.   Currently on statin therapy- high dose - 80mg every HS. Should continue.    Will consider repatha in future if needed.    Refilled rx last visit.     4. Weight - BMI Body mass index is 33.78 kg/m².   Encourage Ada diet and exercise.   Continue glp1. Add the gip1 - try mounjaro .     5. Renal Function - stable. Will monitor trends.     6. MNG - history of MNG - no compressive symptoms.   Last thyroid Ultrasound was in 2015 and FNA negative.   Would recommend repeat for surveillance. Will discuss at follow up visit.  Repeat tft's on next lab draw in 2 weeks.   tsh was slightly high - 4.5 - if still elevated can treat with synthroid 50 daily - adult onset hypothyroidism.     7. S/p Right MCA stroke - on plavix and aspirin and high dose statin now.   Discussed CV risk factors.   (A1c wa at 14% at that piont).      8. Lumbar stenosis - has been recommended spine surgery. Doesn't want surgery for now - concerned as her family members had bad experiences.   I recommended possible spinal injections - placed consult for pain management. She is going. Now in physical therapy.   Still having sciatica down her right leg despite nsaids and therapy.     Switch sami to dexcom g7 -   Try to switch tresiba 30 units daily to omnipod dash -   De consulted -   Switch trulicity to mounjaro    OV in 6 weeks.        Omnipod settings - for dash -   Weight based - 43 untis per day (0.5 units/kg) -   Currently using 30 units of basal insulin daily - tresiba.   43+ 30 = 73 /2 = 36 untis TDD.   18 units total for basal / 24 hours = 0.75 units/hour--- she prior was on 0.7 units/hour- will keep same.   Preset boluses -   2 units snack.   4 units small meal.   6 units medium cristian.   8 units large meal.   Not carb counting for now -   Active insulin time 4 hours.

## 2023-07-06 NOTE — PATIENT INSTRUCTIONS
Continue metformin 1000mg twice per day.   Continue injection every week - switch from trulicity 3mg to Mounjaro 5mg every week - as long as you tolerate this well (no side effects) - we can increase the mounjaro dose per week at our next visit together.     Switch the 14 day sami to a dexcom g7 -     Continue your tresiba insulin daily until we can get you back on omnipod - I have prescribed omnipod dash for you - the PDM starter kit and the pod refills - and 2 vials of fiasp insulin (U100 insulin) to use with it -   If you are able to fill these, please let us know - so we can get you trained with the diabetic educator -   And start the pump back.   For now just continue your tresiba daily -     For low blood sugar - remember to keep glucose tablets on hand. You can purchase these at the pharmacy check out desk/over the counter.   If blood sugar is less than 70, take/eat 2 - 3 tablets quickly to bring your sugar up.   Always keep 15 grams of Quick acting carbohydrates on hand to eat/drink if your sugar is low - examples are 1/2 cup of juice, coke, or crackers, granola bars.   Remember to eat meals frequently to prevent low blood blood sugars.      Diabetic diet - fresh foods - limit processed foods/out to eat/sweets/concentrated sugars.

## 2023-07-07 ENCOUNTER — TELEPHONE (OUTPATIENT)
Dept: DIABETES | Facility: CLINIC | Age: 58
End: 2023-07-07
Payer: COMMERCIAL

## 2023-07-07 ENCOUNTER — TELEPHONE (OUTPATIENT)
Dept: PHARMACY | Facility: CLINIC | Age: 58
End: 2023-07-07
Payer: COMMERCIAL

## 2023-07-11 ENCOUNTER — TELEPHONE (OUTPATIENT)
Dept: INTERNAL MEDICINE | Facility: CLINIC | Age: 58
End: 2023-07-11
Payer: COMMERCIAL

## 2023-07-11 ENCOUNTER — PATIENT MESSAGE (OUTPATIENT)
Dept: DIABETES | Facility: CLINIC | Age: 58
End: 2023-07-11
Payer: COMMERCIAL

## 2023-07-11 ENCOUNTER — PATIENT MESSAGE (OUTPATIENT)
Dept: INTERNAL MEDICINE | Facility: CLINIC | Age: 58
End: 2023-07-11
Payer: COMMERCIAL

## 2023-07-11 DIAGNOSIS — E11.65 TYPE 2 DIABETES MELLITUS WITH HYPERGLYCEMIA, WITH LONG-TERM CURRENT USE OF INSULIN: Primary | ICD-10-CM

## 2023-07-11 DIAGNOSIS — Z79.4 TYPE 2 DIABETES MELLITUS WITH HYPERGLYCEMIA, WITH LONG-TERM CURRENT USE OF INSULIN: Primary | ICD-10-CM

## 2023-07-11 RX ORDER — BLOOD-GLUCOSE TRANSMITTER
1 EACH MISCELLANEOUS DAILY
Qty: 1 EACH | Refills: 3 | Status: SHIPPED | OUTPATIENT
Start: 2023-07-11 | End: 2024-02-06

## 2023-07-11 NOTE — TELEPHONE ENCOUNTER
Talked to [facility].   They were able to change out her g7 for g6 sensors,   I prescribed the g6 transmitter.     So here we go with the omnipod 5 for her.   Sorry change of plans!   You'll need to get her to carb count/teach her -   But I think she will be fine, and be an easy start.     I'll have to update settings:      Omnipod settings -for the 5 (not dash) -   Weight based - 43 untis per day (0.5 units/kg) -   Currently using 30 units of basal insulin daily - tresiba.   43+ 30 = 73 /2 = 36 untis TDD.   18 units total for basal / 24 hours = 0.75 units/hour--- she prior was on 0.7 units/hour- will keep same.   Instead of preset boluses as planned for dash -    we will begin carb counting.   Active insulin time 4 hours for now - we can adjust in future if needed  Target bg 110 - 24 hours per day.   ISF 40  Carb ratio: 1 unit - 10 grams of carbs.     We can start here and see how it goes!   Let me know if any issues/questions.     Thanks,  Patricia .

## 2023-07-12 ENCOUNTER — CLINICAL SUPPORT (OUTPATIENT)
Dept: DIABETES | Facility: CLINIC | Age: 58
End: 2023-07-12
Payer: COMMERCIAL

## 2023-07-12 DIAGNOSIS — Z79.4 TYPE 2 DIABETES MELLITUS WITH HYPERGLYCEMIA, WITH LONG-TERM CURRENT USE OF INSULIN: ICD-10-CM

## 2023-07-12 DIAGNOSIS — E11.65 TYPE 2 DIABETES MELLITUS WITH HYPERGLYCEMIA, WITH LONG-TERM CURRENT USE OF INSULIN: ICD-10-CM

## 2023-07-12 PROCEDURE — G0108 DIAB MANAGE TRN  PER INDIV: HCPCS | Mod: S$GLB,,, | Performed by: DIETITIAN, REGISTERED

## 2023-07-12 PROCEDURE — 99999 PR PBB SHADOW E&M-EST. PATIENT-LVL I: CPT | Mod: PBBFAC,,, | Performed by: DIETITIAN, REGISTERED

## 2023-07-12 PROCEDURE — G0108 PR DIAB MANAGE TRN  PER INDIV: ICD-10-PCS | Mod: S$GLB,,, | Performed by: DIETITIAN, REGISTERED

## 2023-07-12 PROCEDURE — 99999 PR PBB SHADOW E&M-EST. PATIENT-LVL I: ICD-10-PCS | Mod: PBBFAC,,, | Performed by: DIETITIAN, REGISTERED

## 2023-07-12 NOTE — PROGRESS NOTES
Diabetes Care Specialist Progress Note  Author: Shira Kim RD, CDE  Date: 7/12/2023    Program Intake  Reason for Diabetes Program Visit:: Initial Diabetes Assessment  Type of Intervention:: Individual  Individual: Device Training  Device Training: Personal CGM (Dexcom G6)  Current diabetes risk level:: moderate  In the last 12 months, have you:: none    Lab Results   Component Value Date    HGBA1C 8.4 (H) 07/01/2023       Clinical    Problem Review  Reviewed Problem List with Patient: yes  Active comorbidities affecting diabetes self-care.: yes  Comorbidities: Hypertension  Reviewed health maintenance: yes    Clinical Assessment  Current Diabetes Treatment: Insulin pump, Insulin, Oral Medication, Injectable  Have you ever experienced hypoglycemia (low blood sugar)?: yes  Are you able to tell when your blood sugar is low?: Yes  What symptoms do you experience?: Shaky  How do you treat hypoglycemia (low blood sugar)?: 4 glucose tablets  Have you ever experienced hyperglycemia (high blood sugar)?: yes  Are you able to tell when your blood sugar is high?: Yes  What are your symptoms?: other (see comments) (Headache)    Medication Information  How do you obtain your medications?: Patient drives  How many days a week do you miss your medications?: Never  Do you sometimes have difficulty refilling your medications?: No  Medication adherence impacting ability to self-manage diabetes?: No    Labs  Do you have regular lab work to monitor your medications?: Yes  Type of Regular Lab Work: A1c    Nutritional Status  Diet: Regular  Change in appetite?: No  Dentation:: Intact  Recent Changes in Weight: No Recent Weight Change  Current nutritional status an area of need that is impacting patient's ability to self-manage diabetes?: No    Additional Social History    Support  Does anyone support you with your diabetes care?: yes  Who supports you?: family member, self  Who takes you to your medical appointments?: self  Does the  current support meet the patient's needs?: Yes  Is Support an area impacting ability to self-manage diabetes?: No    Access to Mass Media & Technology  Does the patient have access to any of the following devices or technologies?: Smart phone  Media or technology needs impacting ability to self-manage diabetes?: No    Cognitive/Behavioral Health  Alert and Oriented: Yes  Difficulty Thinking: No  Requires Prompting: No  Requires assistance for routine expression?: No  Cognitive or behavioral barriers impacting ability to self-manage diabetes?: No    Culture/Jainism  Culture or Scientology beliefs that may impact ability to access healthcare: No    Communication  Language preference: English  Hearing Problems: No  Vision Problems: No  Communication needs impacting ability to self-manage diabetes?: No    Health Literacy  Preferred Learning Method: Face to Face  How often do you need to have someone help you read instructions, pamphlets, or written material from your doctor or pharmacy?: Never  Health literacy needs impacting ability to self-manage diabetes?: No      Diabetes Self-Management Skills Assessment    Diabetes Disease Process/Treatment Options  Patient/caregiver able to state what happens when someone has diabetes.: somewhat  Patient/caregiver knows what type of diabetes they have.: yes  Diabetes Type : Type II  Identified signs and symptoms:: fatigue, frequent urination, increased thirst  Patient able to identify at least three risk factors for diabetes.: yes  Identified risk factors:: age over 40, being overweight, family history  Diabetes Disease Process/Treatment Options: Skills Assessment Completed: Yes  Assessment indicates:: Adequate understanding  Area of need?: No    Nutrition/Healthy Eating  Method of carbohydrate measurement:: no method  Patient-identified foods:: sweets, soda  Nutrition/Healthy Eating Skills Assessment Completed:: Yes  Assessment indicates:: Instruction Needed  Area of need?:  Yes    Physical Activity/Exercise  Patient's daily activity level:: lightly active  Patient formally exercises outside of work.: no  Patient can identify forms of physical activity.: yes (No limitations)  Stated forms of physical activity:: manual activity at work, any movement performed by muscles that uses energy  Patient can identify reasons why exercise/physical activity is important in diabetes management.: yes  Identified reasons:: lowers blood glucose, blood pressure, and cholesterol  Physical Activity/Exercise Skills Assessment Completed: : Yes  Assessment indicates:: Adequate understanding  Area of need?: No    Medications  Patient is able to describe current diabetes management routine.: yes  Diabetes management routine:: injectable medications, insulin, insulin pump  Patient is able to identify current diabetes medications, dosages, and appropriate timing of medications.: yes  Patient understands the purpose of the medications taken for diabetes.: yes  Patient reports problems or concerns with current medication regimen.: no  Medication Skills Assessment Completed:: Yes  Assessment indicates:: Instruction Needed  Area of need?: Yes (Omnipod 5 start)    Home Blood Glucose Monitoring  Patient states that blood sugar is checked at home daily.: yes  Monitoring Method:: personal continuous glucose monitor  Personal CGM type:: Switching from Donald to Dexcom G6  Home Blood Glucose Monitoring Skills Assessment Completed: : Yes  Assessment indicates:: Instruction Needed  Area of need?: Yes    Acute Complications  Patient is able to identify types of acute complications: Yes  Patient Identified:: Hypoglycemia, Hyperglycemia  Acute Complications Skills Assessment Completed: : Yes  Assessment indicates:: Adequate understanding  Area of need?: No    Chronic Complications  Patient can identify major chronic complications of diabetes.: yes  Stated chronic complications:: heart disease/heart attack, kidney disease,  neuropathy/nerve damage, retinopathy  Patient can identify ways to prevent or delay diabetes complications.: yes  Stated ways to prevent complications:: controlling blood sugar  Patient is taking statin?: Yes  Chronic Complications Skills Assessment Completed: : Yes  Assessment indicates:: Adequate understanding  Area of need?: No    Psychosocial/Coping  Patient can identify ways of coping with chronic disease.: yes  Patient-stated ways of coping with chronic disease:: support from loved ones  Psychosocial/Coping Skills Assessment Completed: : Yes  Assessment indicates:: Adequate understanding  Area of need?: No    Assessment Summary and Plan    Based on today's diabetes care assessment, the following areas of need were identified:      Social 7/12/2023   Support No   Access to Mass Media/Tech No   Cognitive/Behavioral Health No   Culture/Muslim No   Communication No   Health Literacy No        Clinical 7/12/2023   Medication Adherence No   Nutritional Status No        Diabetes Self-Management Skills 7/12/2023   Diabetes Disease Process/Treatment Options No   Nutrition/Healthy Eating Yes - instructed patient on CHO counting, label reading, and addt'l resources to assist w/ CHO counting   Physical Activity/Exercise No   Medication Yes - patient arrived with Omnipod 5 kit, but no insulin - left at home - onboarding set up complete. Patient will be able to use her phone - settings programmed into the pump/phone. Patient to return tomorrow to complete training.   Home Blood Glucose Monitoring Yes - patient arrived with Dexcom sensors, but left the transmitter at home. Dexcom account created and linked to clarity. Patient to return tomorrow to complete set up.   Acute Complications No   Chronic Complications No   Psychosocial/Coping No          Today's interventions were provided through individual discussion, instruction, and written materials were provided.      Patient verbalized understanding of instruction and  written materials.  Pt was able to return back demonstration of instructions today. Patient understood key points, needs reinforcement and further instruction.     Diabetes Self-Management Care Plan:    Today's Diabetes Self-Management Care Plan was developed with Rayne's input. Rayne has agreed to work toward the following goal(s) to improve his/her overall diabetes control.      Care Plan: Diabetes Management   Updates made since 6/12/2023 12:00 AM        Problem: Medications         Goal: Use Omnipod as prescribed    Start Date: 11/18/2021   Expected End Date: 2/18/2022   This Visit's Progress: Met   Priority: High   Barriers: No Barriers Identified        Problem: Medications         Goal: Use Omnipod 5 as instructed    Start Date: 7/12/2023   Expected End Date: 10/12/2023   Priority: High   Barriers: No Barriers Identified     Task: Instructed patient on use of the Omnipod 5 Completed 7/12/2023         Follow Up Plan     Follow up in about 1 day (around 7/13/2023).    Today's care plan and follow up schedule was discussed with patient.  Rayne verbalized understanding of the care plan, goals, and agrees to follow up plan.        The patient was encouraged to communicate with his/her health care provider/physician and care team regarding his/her condition(s) and treatment.  I provided the patient with my contact information today and encouraged to contact me via phone or [facility] Patient Portal as needed.     Length of Visit   Total Time: 60 Minutes

## 2023-07-13 ENCOUNTER — TELEPHONE (OUTPATIENT)
Dept: PHARMACY | Facility: CLINIC | Age: 58
End: 2023-07-13
Payer: COMMERCIAL

## 2023-07-13 ENCOUNTER — CLINICAL SUPPORT (OUTPATIENT)
Dept: DIABETES | Facility: CLINIC | Age: 58
End: 2023-07-13
Payer: COMMERCIAL

## 2023-07-13 DIAGNOSIS — Z79.4 TYPE 2 DIABETES MELLITUS WITH HYPERGLYCEMIA, WITH LONG-TERM CURRENT USE OF INSULIN: Primary | ICD-10-CM

## 2023-07-13 DIAGNOSIS — E11.65 TYPE 2 DIABETES MELLITUS WITH HYPERGLYCEMIA, WITH LONG-TERM CURRENT USE OF INSULIN: Primary | ICD-10-CM

## 2023-07-13 PROCEDURE — G0108 DIAB MANAGE TRN  PER INDIV: HCPCS | Mod: S$GLB,,, | Performed by: DIETITIAN, REGISTERED

## 2023-07-13 PROCEDURE — G0108 PR DIAB MANAGE TRN  PER INDIV: ICD-10-PCS | Mod: S$GLB,,, | Performed by: DIETITIAN, REGISTERED

## 2023-07-13 NOTE — PROGRESS NOTES
Diabetes Care Specialist Progress Note  Author: Shira Kim RD, CDE  Date: 7/13/2023    Program Intake  Reason for Diabetes Program Visit:: Intervention  Type of Intervention:: Individual  Individual: Device Training  Device Training: Personal CGM, Insulin Pump Start    Lab Results   Component Value Date    HGBA1C 8.4 (H) 07/01/2023     Assessment Summary and Plan    Based on today's diabetes care assessment, the following areas of need were identified:      Social 7/12/2023   Support No   Access to Mass Media/Tech No   Cognitive/Behavioral Health No   Culture/Jainism No   Communication No   Health Literacy No        Clinical 7/12/2023   Medication Adherence No   Nutritional Status No        Diabetes Self-Management Skills 7/12/2023   Diabetes Disease Process/Treatment Options No   Nutrition/Healthy Eating Yes   Physical Activity/Exercise No   Medication Yes - OMNI POD 5 INSULIN PUMP START    Pump training was provided per Omni Pod protocol.  Patient has used an insulin pump in the past.   Patient is not new to insulin pump therapy but this will be first Automated system she will be using.       Basal:  12AM: 0.7 units/hr     Max basal rate: 2 u/hr     Bolus:  CHO ratio: 1:10  ISF: 1:40  Glucose target : 110 mg/dL  Correct  over: 150 mg/dl  Active insulin time: 3 hours  Max bolus: 20 units     Low reservoir insulin alarm: 15 units  Change pod alarm: every 3 days       Details of pump therapy were covered included following controller features and programming, pod activation, pod site selection and rotation, automatic pod priming and insertion, setting & editing basal rates in manual mode, giving bolus and other features in the set-up menu.  The following regarding the Omnipod 5 was covered:  During your first Pod wear, since no recent history is available, the Omnipod 5 System uses only your active Basal Program from Manual Mode as a starting point to adjust your insulin. After 48 hours of history is collected,  "which usually happens with the first Pod wear, SmartParcel technology stops adjusting insulin against your active Basal Program and starts using the Adaptive Basal Rate for your automated insulin delivery with your next Pod change. With each Pod change, insulin delivery information is sent and saved in the Omnipod 5 Queenie so that the next Pod that is started is updated with the new Adaptive Basal Rate. With each new Pod activation, the system adapts insulin delivery based on physiological needs and total daily insulin (TDI) delivered. After 2-3 Pod changes, adaptation generally stabilizes and automated insulin delivery is based on this adaptation.  Patient demonstrated ability to program controller, activate and insert pod using aseptic technique.  Patient demonstrated ability to program Dexcom transmitter into controller and start automated limited mode.    Instructed pt on use of basic pump features ie...give a bolus, pause insulin, switch from manual to automated mode.  Reviewed features available in manual mode verses automated mode.   Reviewed when and how to use activity function in automated mode.  Reviewed site selection of pods, rotation of sites and hard stop to change pod every 72 hrs.   Instructed that insulin vial is good out of refrigeration for up to 28-30 days.   Reviewed treatment of hypoglycemia, hyperglycemia; sick day care, DKA, and troubleshooting of pump.  Omni Pod 24-hour support line provided.       Podder username: [email or web address]  Podder password: [email or web address]!     Amberly username: [email or web address]  Gloparadise password: [email or web address]!       Home Blood Glucose Monitoring Yes - DEXCOM G6 CGM TRAINING     Patient referred to clinic today for Dexcom G6 continuous glucose sensor system training.  Patient arrived with  fully charged and Dexcom G6 mobile queenie downloaded to phone. Education was provided using "Quick Start Guide" per Dexcom protocol.     Pt will be using her phone as the primary " .  Overview:  5min glucose reading updates, trending arrows, BG graph screens, battery life indicator, Blue Tooth Symbol.  Menus: trend Graph, start sensor, enter BG, events, Alerts, Settings, Shutdown, Stop Sensor   Settings:                          * Urgent Low: 55 mg/dL                          * Urgent Low Soon: on                          * Low alert: 80 mg/dl repeat 30 min                          * High alert: 250 mg/dl repeat 120 min                          * Rise rate: off                          * Fall Rate: off                          * Signal Loss: on repeat 20 min                          * No Reading: on repeat 20 min                          * Always sound: on                             Reviewed additional setting options with patient, including Graph Height and Transmitter ID. Transmitter was paired with .    Reviewed where to find sensor insertion time and sensor expiration date.   Discussed no need to calibrate sensor during the entirety of the 10 day wear. Discussed that pt can calibrate sensor if desired, but at that time she will need to continue calibrating every 12 hours for sensor to remain accurate. Reviewed appropriate calibration techniques.  Reviewed sensor site selection. Site selected and prepped using aseptic technique Inserted to left abdomen. Transmitter placed in pod and secured.  Practiced sensor pod/transmitter removal from site, and removal of transmitter from sensor pod.  Patient able to demonstrate without difficulty.  Encouraged to review manual prior to starting another sensor.   Reviewed problem solving aspects of sensor transmission/ variables that can disrupt RF transmission.  range 20 feet, but the first 3 hrs keep within 3 feet of transmitter.  Pt instructed on lag time of interstitial fluid from CBG and was advised to tx hypoglycemia and dose insulin based on SMBG values.  Dexcom technical support contact number given and examples of  when to contact them discussed. Pt linked to clarity - UN: [email or web address]; PW: [email or web address]  Patient advised to always check glucose with glucometer should readings do not match symptoms felt (ex. Hypo or hyperglycemia).       Acute Complications No   Chronic Complications No   Psychosocial/Coping No          Today's interventions were provided through individual discussion, instruction, and written materials were provided.      Patient verbalized understanding of instruction and written materials.  Pt was able to return back demonstration of instructions today. Patient understood key points, needs reinforcement and further instruction.     Diabetes Self-Management Care Plan:    Today's Diabetes Self-Management Care Plan was developed with Rayne's input. Rayne has agreed to work toward the following goal(s) to improve his/her overall diabetes control.      Care Plan: Diabetes Management   Updates made since 6/13/2023 12:00 AM        Problem: Medications         Goal: Use Omnipod as prescribed    Start Date: 11/18/2021   Expected End Date: 2/18/2022   This Visit's Progress: Met   Priority: High   Barriers: No Barriers Identified        Problem: Medications         Goal: Use Omnipod 5 as instructed    Start Date: 7/12/2023   Expected End Date: 10/12/2023   Priority: High   Barriers: No Barriers Identified        Task: Instructed patient on use of the Omnipod 5 Completed 7/12/2023       Follow Up Plan     Follow up in about 5 days (around 7/18/2023).    Today's care plan and follow up schedule was discussed with patient.  Rayne verbalized understanding of the care plan, goals, and agrees to follow up plan.        The patient was encouraged to communicate with his/her health care provider/physician and care team regarding his/her condition(s) and treatment.  I provided the patient with my contact information today and encouraged to contact me via phone or [facility] Patient Portal as needed.     Length of Visit    Total Time: 60 Minutes

## 2023-07-18 ENCOUNTER — CLINICAL SUPPORT (OUTPATIENT)
Dept: DIABETES | Facility: CLINIC | Age: 58
End: 2023-07-18
Payer: COMMERCIAL

## 2023-07-18 DIAGNOSIS — Z79.4 TYPE 2 DIABETES MELLITUS WITH HYPERGLYCEMIA, WITH LONG-TERM CURRENT USE OF INSULIN: Primary | ICD-10-CM

## 2023-07-18 DIAGNOSIS — E11.65 TYPE 2 DIABETES MELLITUS WITH HYPERGLYCEMIA, WITH LONG-TERM CURRENT USE OF INSULIN: Primary | ICD-10-CM

## 2023-07-18 PROCEDURE — G0108 DIAB MANAGE TRN  PER INDIV: HCPCS | Mod: 95,,, | Performed by: DIETITIAN, REGISTERED

## 2023-07-18 PROCEDURE — G0108 PR DIAB MANAGE TRN  PER INDIV: ICD-10-PCS | Mod: 95,,, | Performed by: DIETITIAN, REGISTERED

## 2023-07-18 NOTE — PROGRESS NOTES
Diabetes Care Specialist Progress Note  Author: Shira Kim RD, CDE  Date: 7/18/2023    Diabetes Care Specialist Virtual Visit Note       The patient location is: [place]  The chief complaint leading to consultation is: Diabetes  Visit type: audiovisual  Total time spent with patient: 30 min   Each patient to whom he or she provides medical services by telemedicine is:  (1) informed of the relationship between the physician and patient and the respective role of any other health care provider with respect to management of the patient; and (2) notified that he or she may decline to receive medical services by telemedicine and may withdraw from such care at any time.     Program Intake  Reason for Diabetes Program Visit:: Intervention  Type of Intervention:: Individual  Individual: Education  Education: Pattern Management    Lab Results   Component Value Date    HGBA1C 8.4 (H) 07/01/2023     Patient returns for Omnipod/Dexcom follow up - started 7/13. Overall, patient is doing very well - TIR 81%, High 14%, Very High 5%. Patient wore Omnipod classic in the past - she is able to change out pod. Staying in automode 100% of the time. She feels she is doing well overall with CHO counting - this is new for her. She does occasionally give correction boluses. No questions or concerns today. She will see provider on 8/15.    Assessment Summary and Plan    Based on today's diabetes care assessment, the following areas of need were identified:      Social 7/12/2023   Support No   Access to Mass Media/Tech No   Cognitive/Behavioral Health No   Culture/Judaism No   Communication No   Health Literacy No        Clinical 7/12/2023   Medication Adherence No   Nutritional Status No        Diabetes Self-Management Skills 7/12/2023   Diabetes Disease Process/Treatment Options No   Nutrition/Healthy Eating Yes   Physical Activity/Exercise No   Medication Yes   Home Blood Glucose Monitoring Yes   Acute Complications No   Chronic  Complications No   Psychosocial/Coping No          Today's interventions were provided through individual discussion, instruction, and written materials were provided.      Patient verbalized understanding of instruction and written materials.  Pt was able to return back demonstration of instructions today. Patient understood key points, needs reinforcement and further instruction.     Diabetes Self-Management Care Plan:    Today's Diabetes Self-Management Care Plan was developed with Rayne's input. Rayne has agreed to work toward the following goal(s) to improve his/her overall diabetes control.      Care Plan: Diabetes Management   Updates made since 6/18/2023 12:00 AM        Problem: Medications         Goal: Use Omnipod as prescribed    Start Date: 11/18/2021   Expected End Date: 2/18/2022   This Visit's Progress: Met   Priority: High   Barriers: No Barriers Identified        Problem: Medications         Goal: Use Omnipod 5 as instructed    Start Date: 7/12/2023   Expected End Date: 10/12/2023   Priority: High   Barriers: No Barriers Identified     Task: Instructed patient on use of the Omnipod 5 Completed 7/12/2023       Follow Up Plan     Follow up in about 4 weeks (around 8/15/2023) for Provider Visit.    Today's care plan and follow up schedule was discussed with patient.  Rayne verbalized understanding of the care plan, goals, and agrees to follow up plan.        The patient was encouraged to communicate with his/her health care provider/physician and care team regarding his/her condition(s) and treatment.  I provided the patient with my contact information today and encouraged to contact me via phone or [facility] Patient Portal as needed.     Length of Visit   Total Time: 30 Minutes

## 2023-08-01 ENCOUNTER — PATIENT OUTREACH (OUTPATIENT)
Dept: ADMINISTRATIVE | Facility: HOSPITAL | Age: 58
End: 2023-08-01
Payer: COMMERCIAL

## 2023-08-01 NOTE — PROGRESS NOTES
Health Maintenance Due   Topic Date Due    Shingles Vaccine (1 of 2) Never done    COVID-19 Vaccine (2 - Booster for Steve series) 05/09/2021    Eye Exam  09/22/2023     Chart reviewed.   Immunizations: Reconciled  Orders placed: N/A  Upcoming appts to satisfy DANIEL topics: Mammogram 8/8/2023

## 2023-08-15 ENCOUNTER — OFFICE VISIT (OUTPATIENT)
Dept: INTERNAL MEDICINE | Facility: CLINIC | Age: 58
End: 2023-08-15
Payer: COMMERCIAL

## 2023-08-15 VITALS
OXYGEN SATURATION: 99 % | DIASTOLIC BLOOD PRESSURE: 78 MMHG | BODY MASS INDEX: 33.09 KG/M2 | TEMPERATURE: 98 F | RESPIRATION RATE: 16 BRPM | WEIGHT: 186.75 LBS | SYSTOLIC BLOOD PRESSURE: 112 MMHG | HEART RATE: 72 BPM | HEIGHT: 63 IN

## 2023-08-15 DIAGNOSIS — Z79.4 TYPE 2 DIABETES MELLITUS WITH HYPERGLYCEMIA, WITH LONG-TERM CURRENT USE OF INSULIN: Primary | ICD-10-CM

## 2023-08-15 DIAGNOSIS — Z78.9 IMPAIRED INSTRUMENTAL ACTIVITIES OF DAILY LIVING (IADL): ICD-10-CM

## 2023-08-15 DIAGNOSIS — E11.69 HYPERLIPIDEMIA ASSOCIATED WITH TYPE 2 DIABETES MELLITUS: ICD-10-CM

## 2023-08-15 DIAGNOSIS — E11.59 HYPERTENSION ASSOCIATED WITH DIABETES: ICD-10-CM

## 2023-08-15 DIAGNOSIS — E11.65 TYPE 2 DIABETES MELLITUS WITH HYPERGLYCEMIA, WITH LONG-TERM CURRENT USE OF INSULIN: Primary | ICD-10-CM

## 2023-08-15 DIAGNOSIS — E66.09 CLASS 1 OBESITY DUE TO EXCESS CALORIES WITH SERIOUS COMORBIDITY AND BODY MASS INDEX (BMI) OF 33.0 TO 33.9 IN ADULT: ICD-10-CM

## 2023-08-15 DIAGNOSIS — E78.5 HYPERLIPIDEMIA ASSOCIATED WITH TYPE 2 DIABETES MELLITUS: ICD-10-CM

## 2023-08-15 DIAGNOSIS — I15.2 HYPERTENSION ASSOCIATED WITH DIABETES: ICD-10-CM

## 2023-08-15 PROCEDURE — 95251 PR GLUCOSE MONITOR, 72 HOUR, PHYS INTERP: ICD-10-PCS | Mod: S$GLB,,, | Performed by: NURSE PRACTITIONER

## 2023-08-15 PROCEDURE — 99215 PR OFFICE/OUTPT VISIT, EST, LEVL V, 40-54 MIN: ICD-10-PCS | Mod: S$GLB,,, | Performed by: NURSE PRACTITIONER

## 2023-08-15 PROCEDURE — 3074F PR MOST RECENT SYSTOLIC BLOOD PRESSURE < 130 MM HG: ICD-10-PCS | Mod: CPTII,S$GLB,, | Performed by: NURSE PRACTITIONER

## 2023-08-15 PROCEDURE — 3052F HG A1C>EQUAL 8.0%<EQUAL 9.0%: CPT | Mod: CPTII,S$GLB,, | Performed by: NURSE PRACTITIONER

## 2023-08-15 PROCEDURE — 3072F LOW RISK FOR RETINOPATHY: CPT | Mod: CPTII,S$GLB,, | Performed by: NURSE PRACTITIONER

## 2023-08-15 PROCEDURE — 1159F PR MEDICATION LIST DOCUMENTED IN MEDICAL RECORD: ICD-10-PCS | Mod: CPTII,S$GLB,, | Performed by: NURSE PRACTITIONER

## 2023-08-15 PROCEDURE — 4010F PR ACE/ARB THEARPY RXD/TAKEN: ICD-10-PCS | Mod: CPTII,S$GLB,, | Performed by: NURSE PRACTITIONER

## 2023-08-15 PROCEDURE — 3072F PR LOW RISK FOR RETINOPATHY: ICD-10-PCS | Mod: CPTII,S$GLB,, | Performed by: NURSE PRACTITIONER

## 2023-08-15 PROCEDURE — 3066F PR DOCUMENTATION OF TREATMENT FOR NEPHROPATHY: ICD-10-PCS | Mod: CPTII,S$GLB,, | Performed by: NURSE PRACTITIONER

## 2023-08-15 PROCEDURE — 95251 CONT GLUC MNTR ANALYSIS I&R: CPT | Mod: S$GLB,,, | Performed by: NURSE PRACTITIONER

## 2023-08-15 PROCEDURE — 3066F NEPHROPATHY DOC TX: CPT | Mod: CPTII,S$GLB,, | Performed by: NURSE PRACTITIONER

## 2023-08-15 PROCEDURE — 3078F DIAST BP <80 MM HG: CPT | Mod: CPTII,S$GLB,, | Performed by: NURSE PRACTITIONER

## 2023-08-15 PROCEDURE — 99999 PR PBB SHADOW E&M-EST. PATIENT-LVL IV: CPT | Mod: PBBFAC,,, | Performed by: NURSE PRACTITIONER

## 2023-08-15 PROCEDURE — 3078F PR MOST RECENT DIASTOLIC BLOOD PRESSURE < 80 MM HG: ICD-10-PCS | Mod: CPTII,S$GLB,, | Performed by: NURSE PRACTITIONER

## 2023-08-15 PROCEDURE — 99215 OFFICE O/P EST HI 40 MIN: CPT | Mod: S$GLB,,, | Performed by: NURSE PRACTITIONER

## 2023-08-15 PROCEDURE — 3008F PR BODY MASS INDEX (BMI) DOCUMENTED: ICD-10-PCS | Mod: CPTII,S$GLB,, | Performed by: NURSE PRACTITIONER

## 2023-08-15 PROCEDURE — 99999 PR PBB SHADOW E&M-EST. PATIENT-LVL IV: ICD-10-PCS | Mod: PBBFAC,,, | Performed by: NURSE PRACTITIONER

## 2023-08-15 PROCEDURE — 3061F NEG MICROALBUMINURIA REV: CPT | Mod: CPTII,S$GLB,, | Performed by: NURSE PRACTITIONER

## 2023-08-15 PROCEDURE — 3008F BODY MASS INDEX DOCD: CPT | Mod: CPTII,S$GLB,, | Performed by: NURSE PRACTITIONER

## 2023-08-15 PROCEDURE — 95249 CONT GLUC MNTR PT PROV EQP: CPT | Mod: S$GLB,,, | Performed by: NURSE PRACTITIONER

## 2023-08-15 PROCEDURE — 3052F PR MOST RECENT HEMOGLOBIN A1C LEVEL 8.0 - < 9.0%: ICD-10-PCS | Mod: CPTII,S$GLB,, | Performed by: NURSE PRACTITIONER

## 2023-08-15 PROCEDURE — 3061F PR NEG MICROALBUMINURIA RESULT DOCUMENTED/REVIEW: ICD-10-PCS | Mod: CPTII,S$GLB,, | Performed by: NURSE PRACTITIONER

## 2023-08-15 PROCEDURE — 1159F MED LIST DOCD IN RCRD: CPT | Mod: CPTII,S$GLB,, | Performed by: NURSE PRACTITIONER

## 2023-08-15 PROCEDURE — 3074F SYST BP LT 130 MM HG: CPT | Mod: CPTII,S$GLB,, | Performed by: NURSE PRACTITIONER

## 2023-08-15 PROCEDURE — 95249 PR GLUCOSE MONITORING, 72 HRS, SUB-Q SENSOR, PATIENT PROVIDED: ICD-10-PCS | Mod: S$GLB,,, | Performed by: NURSE PRACTITIONER

## 2023-08-15 PROCEDURE — 4010F ACE/ARB THERAPY RXD/TAKEN: CPT | Mod: CPTII,S$GLB,, | Performed by: NURSE PRACTITIONER

## 2023-08-15 RX ORDER — AMLODIPINE BESYLATE 2.5 MG/1
2.5 TABLET ORAL DAILY
Qty: 90 TABLET | Refills: 3 | Status: SHIPPED | OUTPATIENT
Start: 2023-08-15 | End: 2024-08-14

## 2023-08-15 NOTE — PATIENT INSTRUCTIONS
Continue metformin 1000mg twice per day.     Continue on omnipod 5 - if continues to need less insulin, discontinue use of the pod.   You may need less insulin as you are on mounjaro weekly and losing weight and eating less.     If you need even 10 units daily - could try tresiba 10 units once per day -   If you don't need the pod -   Let me know if low blood sugars!     Continue on mounjaro - increase from 5mg to 7.5mg every week.    For low blood sugar - remember to keep glucose tablets on hand. You can purchase these at the pharmacy check out desk/over the counter.   If blood sugar is less than 70, take/eat 2 - 3 tablets quickly to bring your sugar up.   Always keep 15 grams of Quick acting carbohydrates on hand to eat/drink if your sugar is low - examples are 1/2 cup of juice, coke, or crackers, granola bars.   Remember to eat meals frequently to prevent low blood blood sugars.

## 2023-08-15 NOTE — PROGRESS NOTES
58 y.o. female here for 6 week follow up visit for management of T2dm -   Last seen July 2023 - those notes below.     A1c is last at 8.4% in July 2023 - no new labs for today's visit -   Continues on metformin 1000 mg twice per day.   Prior was on tresiba 30 units daily - (prior to this was on omnipod classic) -   I ordered omnipod dash at last visit, however she ended up with omnipod 5 instead.   Trained and switched to this - now only needing around 11 units of insulin per day.   Was prior on trulicity 3mg every week - switched her to mounjaro last month and she likes it -   Taking 5mg every week - less hungry and has lost around 10 lbs + more in 1 month's time.   Her sugar craving has dissapeared almost completely -   Sugar free puddings, more salads and veggies.     She is newly on omnipod 5 - pods from pharmacy   99% of the day in automated mode -   Total daily dose is 11.8 units/day -   72% is basal rate.   28% is coming from bolusing.   Basal rate is at 0.7 units/hour -   Isf at 40 -   Carb ratio 1: 10 -   Bg target 110 -   Active insulin time is at 4 hours -   Bg correction 150 -     Newly on dexcom g6 - downloaded and reviewed today -   See scanned in  - she likes this much more than sami 14 day.   Average glucose 171 -   66% time in range.   0% lows.   29%   5% very highs.       Last visit notes from July 6th, 2023 -   58 y.o. female here for routine follow up for management of T2dm -   Last seen in 2022 - those notes below.     A1c up again trending higher at 8.4%  (Was 7.5% last year and then 6.4% last year on insulin pump therapy).  Continues on metformin 1000 mg twice per day -   Continues on insulin - long acting - Tresiba 30 units every morning.   (Prior was on omnipod classic and we tried vgo prior to that).  Was on MDI prior to that even...    She liked having omnipod and did well on it - we tried to get dash approved, but never was successful.   She is on trulicity still 3mg every  week - tolerated well.   We tried farxiga last year - helped a lot, however terrible yeast infection - so stopped taking it.   Admits not eating well.   Wants to get back on track.     Continues on sami 14 day - is scanning 1 - 2 times per day on average - having to pay cash for cgm sensors -   See scanned in  - pays for sensors at the pharmacy - cash pay.   (She's only scanning/checking 1 - 2 times per day at most).   Average glucose 212 -   26% glucose only time in range.   54% highs.   20% very highs.   0% low blood sugars.     Still working full time.   Not exercising.     Last visit notes from 2022 as follow   57 y.o. female, here for 6 month follow up for management of T2dm   Last seen in February 2022 - those notes are below.     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   She has new insurance - but not sure omnipod insulin pump is covered on her current plan -   She was on on omnipod classic prior visits.   vgo 20 prior to that.   The meal time insulin really seemed to help.     Stopped using omnipod when she ran out of pods - around April/may 2022 - she called me to notify me.   We tried to get omnipod dash covered without success.  I switched her to long acting insulin once per day at that point as she really wasn't needing much insulin (around 23 units/day).   Right now she is taking Tresiba 30 units once every morning.   Also on trulicity 3 mg SC every week - takes on thursdays -   Continues metformin 1000 mg twice per day.   We had not tried an sglt2i yet - she had been concerned about cost.   She has good renal function. And no CI's to this drug class.     Admits lately not following ADA diet.   Has gained some weight. But she is motivated to lose weight again.   She'd like to get  off of insulin if possible.     Free style sami - personal.   Downloaded and reviewed today -   See scanned in .   Average glucose 161   70% in target range.   28% highs.   2% extreme highs.   No lows.       Last visit notes as follows from February 2022   57 y.o. female, here for routine 3 month follow up visit - for management of T2dm -   Last seen in November 2021 - those notes are below.     a1c is decreased from 6.9% to 6.4%.   Reports she has gained weight from holiday times - wasn't eating that great.   However is trying to restart better habits.   History of stroke (right MCA) -  impaired mobility at times - however she is back to work now.     Continues on metformin 1000 bid.    trulicity 3mg every week.   Had tried to add her on sglt2i in past, but it was too expensive and she really isn't privy to adding more medications at this time.   Switched her from vgo to omnipod - she is on older version (not dash) - gets through dme.   Was on novolog and now needs humalog rx today - her insurance will only cover.     omnipod downloaded - on glooko today -   Total daily dose is 23.2 units/day. She is needing much less insulin.   69% of insulin is coming from basal rate.   31% of insulin is coming from boluses.   Using pre-set boluses -   Basal rate is at 0.7 units/hour.   Active insulin time is set at 4 hours.  ISF set at 0 - does preset boluses - 4 or 6 units.   2 units for a snack.     On free style sami - downloaded and reviewed today -   Average glucose is 145   a1c estimated @ 6.8%   81% time in range.   0% lows.   17% highs.   2% extreme highs.     History of hyperthyroidism borderline in the past - her tft's have been stable since -   Her tsh today is slightly elevated at 4.588 - reports she is feeling sometimes fatigued.   Sometimes having night sweats, and has also gained weight over the past few months.   She is on zoloft - had dose increased and this has helped.       Last visit notes as  "follows from 11/2021:   56 y.o. female, here for follow up visit for management of T2dm -   Last seen 7/8/21 - those notes are below.     a1c more controlled @ 6.9%.   Trulicity 3 mg every week. No side effects (we had just increased her from the 1.5mg to 3mg every week).   Also on metformin 1000 mg twice per day.   On vgo 20 - 3 clicks with meal for most part - sometimes gives extra click for a "correction" if the bg is > 180 -   1 click with snack on occasion.   Using [employer]  - she met with diabetic educator -   She just downloaded it on her phone today - forgot her reader.   So not much data.   Average bg is 147 -   100% tir - see report scanned in .     Reports that she does have some lower sugar readings over night - sometimes 70 - 80's   But most of the time 100 - 130's during the day.   Not exercising a lot - has some back issues/so prohibited a bit from this.   Continuing on gabapentin which helps with her neuropathy in legs.   Also taking diclofenac 2 times per day which helps.   She is trying to get more active and is walking her dogs (she has 3 of them ) 3 times per day for walks.       Last visit notes as follows from 7/8/2021:   56 y.o. female, here for 3 month follow up visit for management of T2dm   Last seen April 2021 - those notes below.     a1c now improved @ 7.1%.   She is on metformin 1000 bid.   trulicity 1.5mg every week   And vgo 20 -     Continues on freestyle sami personal cgm -   Downloaded and reviewed today - see scanned in . -   Average bg is 143 -   a1c estimated at 6.7%.   85% TIR  0% lows.   1% very lows - 1 occasion.   13% highs.   1% extreme highs.         Last visit notes as follows from April 2021:  56 y.o. female here for follow up visit for T2dm, last seen 2/25/2021 - thos notes are blow.     a1c was last @ 13.7% and is now estimated at 6.2% on her cgm.   Labs reveal a1c currently is at 7.9%.   She has great trends. Feeling better. She is extremely happy with her " "results and quick turn around improvement.   Eating healthier, history of a stroke, but very motivated and refuses to 'be sick again".     Patient continues on metformin 1000 mg twice per day.   Also on trulicity 1.5mg every week.   In the interim, I switched her from MDI to vgo 20 - doing 3 clicks with meals tid.   Occasional clicks with snacks.   Met with tuyet sanchez and trained on vgo 3/5/2021 -     Had tried to start her on farxiga or jardiance, but too expensive on her plan,   And prefers to stay on same regimen.     Free style sami - interpreted today and see scanned in to .   Average glucose is 119.   a1c estimated at 6.2%.   91% time in range.   3% lows (happening after meal time boluses and over night).   6%  Highs.       Last OV notes as follows:   56 y.o. female, here for 6 week follow up for T2dm management.   Last seen 1/26/2021 - those notes below.     a1c had been at 13.7%, but she had stopped her mealt kasia insulin and her glp1 before seeing me.   Continues on metformin 1000mg twice per day.   She is now on trulicity 1.5mg every week on fridays. Tolerating well - she's had some weight loss - 5 lbs since last visit.   Feels it does suppress appetite somewhat.   Continues on Levemir 30 units every night now. (was on lower amount 25 units).   She is back on novolog prior to meals - tid - 6 units. Reports it is difficult however to remember meal time boluses.   She is on injections 4 times per day. titrates her insulin based on self testing results.   She if finger sticking. dexcom was approved. But cost is high on her current insurance plan as she hasn't met her deductible yet.   States sugars are improved - no logs with her today -   States sugars fasting are running 170 - 200's now, so improved from 300's from past visit.   No acute complaints today's visit.       Last visit notes from 1/26/2021 as follows:   HPI: Rayne Aaron is a 58 y.o.  female c/I for visit to address Diabetes Type " 2  This is the first time I am seeing her for care, follows with Dr. Ware, Deb PENA MD for primary care needs.   She has seen Donna George NP in the past for management for her diabetes. At [facility].   She has moved closer to [facility], so wanted to switch providers.     was diagnosed with T2DM in 2009.    Her father, paternal grandparents and brothers and sisters all have T2dm. (9 siblings total)  Denies missing doses of DM medication.   Has been on and off medications through the years -  was on insulin, then controlled and taken off.   Also reports stopping/ran out last year in 2019.     Now has restarted her insulin over the past month following a stroke.   Most recently was diagnosed with stroke in December 2020.   No deficits. She has recovered and is back working at home.   She is motivated to get her diabetes under control and prevent further progression of her diabetes.   She does not want another stroke.     Denies any other complications from diabetes   Denies nephropathy.   Denies Diabetic retinopathy.   + HTN, + HLD.   + stroke - now on aspirin, plavix and statin.     Last saw Donna George NP in 4/2019 -   Her a1c was > 14% at that time. And she had been off of her insulin at that time for about 3 months.   Current a1c is still high at 13.7%.   Had been ordered a vgo patch, but patient states she is not sure that it was ever approved.   She also had wanted to be on a cgm to avoid the frequent fingersticks, but states her insurance company never approved.      Current regimen:  Levemir 26 U HS  (previously on novolog insulin 8 units with meals - but ran out of rx. Hospital did not restart).   Metformin 1000 mg BID  Was on trulicity weekly and ozempic at one point weekly - but ran out, then insurance stopped approving.   Denies any known side effects to GLP1's.     She is checking her glucoses 4 times per day.   Reports history of hypoglycemic episodes and unawareness.   No bg logs - but  reports sugars range from 250 - 350's overall.       Past medical History:   Past Medical History:   Diagnosis Date    Diabetes mellitus, type 2     Hyperlipidemia     Hypertension     Multinodular goiter 10/21/2015    Stroke     Subclinical hyperthyroidism 10/21/2015      Family hx:   Family History   Problem Relation Age of Onset    Dementia Mother     Heart disease Mother     Hypertension Mother     Heart disease Father     Diabetes Father     Diabetes Maternal Grandmother     Hypertension Maternal Grandmother     Ovarian cancer Maternal Grandmother     Depression Maternal Grandfather     Diabetes Paternal Grandmother     Peripheral vascular disease Paternal Grandmother     Hyperthyroidism Sister     Breast cancer Neg Hx     Colon cancer Neg Hx     Thyroid cancer Neg Hx       Current meds:   Current Outpatient Medications:     amLODIPine (NORVASC) 2.5 MG tablet, Take 1 tablet (2.5 mg total) by mouth once daily., Disp: 90 tablet, Rfl: 3    atorvastatin (LIPITOR) 80 MG tablet, Take 1 tablet (80 mg total) by mouth every evening., Disp: 90 tablet, Rfl: 3    blood-glucose sensor (DEXCOM G6 SENSOR) Misty, 1 each by Misc.(Non-Drug; Combo Route) route every 10 days., Disp: 3 each, Rfl: 11    blood-glucose transmitter (DEXCOM G6 TRANSMITTER) Misty, 1 each by Misc.(Non-Drug; Combo Route) route Daily. Use transmitter in sensor with G6 system. Change new transmitter every 3 months., Disp: 1 each, Rfl: 3    gabapentin (NEURONTIN) 300 MG capsule, Take 1 capsule (300 mg total) by mouth 3 (three) times daily., Disp: 270 capsule, Rfl: 3    insulin aspart, niacinamide, (FIASP U-100 INSULIN) 100 unit/mL Soln, Inject 70 Units into the skin continuous. Use as directed with omnipod dash insulin pump, Disp: 20 mL, Rfl: 11    insulin glargine, TOUJEO, (TOUJEO SOLOSTAR U-300 INSULIN) 300 unit/mL (1.5 mL) InPn pen, Inject 30 Units into the skin every evening., Disp: 6 pen, Rfl: 3    insulin pump cart,auto,BT-cntr (OMNIPOD 5 G6 INTRO KIT,  "GEN 5,) Crtg, 1 each by Misc.(Non-Drug; Combo Route) route continuous., Disp: 1 each, Rfl: 0    insulin pump cart,automated,BT (OMNIPOD 5 G6 PODS, GEN 5,) Crtg, Inject 1 each into the skin every other day. Change pod every 2 days, Disp: 15 each, Rfl: 11    metFORMIN (GLUCOPHAGE) 1000 MG tablet, Take 1 tablet (1,000 mg total) by mouth 2 (two) times daily with meals., Disp: 180 tablet, Rfl: 3    methylPREDNISolone (MEDROL, MAURICIO,) 4 mg tablet, take as directed per package, Disp: 21 tablet, Rfl: 1    pen needle, diabetic 32 gauge x 5/32" Ndle, Uses 3 times a day., Disp: 100 each, Rfl: 6    sertraline (ZOLOFT) 100 MG tablet, Take 1 tablet (100 mg total) by mouth once daily., Disp: 90 tablet, Rfl: 3    valsartan (DIOVAN) 320 MG tablet, Take 1 tablet (320 mg total) by mouth once daily., Disp: 90 tablet, Rfl: 3    aspirin (ECOTRIN) 81 MG EC tablet, Take 1 tablet (81 mg total) by mouth once daily., Disp: 180 tablet, Rfl: 2    tirzepatide 7.5 mg/0.5 mL PnIj, Inject 7.5 mg into the skin every 7 days., Disp: 4 pen , Rfl: 3     Current Diabetes medications:    Metformin 1000 mg po bid with food  trulicity 3 mg sc weekly.   Tresiba 30 units every morning     Medications Tried and Failed:   Had been on trulicity in past   ozempic in past.    Long acting insulin:  Levemir 30 units every HS.   Short acting insulin: novolog 6 units tid ac meals.  Was on VGO in past. Now switched to omnipod (insurance coverage).   novolog insulin via Omnipod pump (formerly on vgo).   Farxiga 5mg po daily - bad yeast infections -     Review of Pertinent co-morbidities/risk factors:   CV: Denies history of MI. + stroke.   CAD: Denies.  Takes aspirin 81mg tablet daily and plavix also.   BP: has history of HTN  Statin: Taking  ACE/ARB: Taking    Social History     Tobacco Use   Smoking Status Never   Smokeless Tobacco Never     Social:   Lives at home with her son.   Life changes/stressors currently:  passed away in October 2020 - so has been " "stressed since then.   Just started new job in December.   Diet: not always following ADA diet   Meals: 3 per day and snacks.        Breakfast - 1/2 of a muffin or banana       Lunch - subway, sandwich at home. Veggies.        Dinner - fish, smoked brisquet, steak. Potatoes.        Snacks - sugar free chocolate pudding. Yogurt, mixed fruit        Drinks - diet pepsi, diet coke, water. Flavored arredondo  Exercise: walking dogs.   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   Was working from Home. .     Glucose Monitoring:   New personal Dexcom G6 - with omnipod 5   Personal free style sami CGM  Previoulsy, was Checking sugars 4x/day by fingerstick.   Gets supplies from pharmacy.   Was getting Omnipods (older version - classic ) - mailed to her - through dme - she is not sure which company.     Standards of care:   Eyes: .: 09/22/2022  Foot exam: : 03/08/2023   Diabetes education:  Yes - February 2021.    Vital Signs  /78 (BP Location: Right arm, Patient Position: Sitting, BP Method: Large (Manual))   Pulse 72   Temp 97.8 °F (36.6 °C) (Temporal)   Resp 16   Ht 5' 3" (1.6 m)   Wt 84.7 kg (186 lb 11.7 oz)   LMP 09/08/2015 (Approximate)   SpO2 99%   BMI 33.08 kg/m²     Pertinent Labs:   Hgba1c   Lab Results   Component Value Date    HGBA1C 8.4 (H) 07/01/2023    HGBA1C 8.3 (H) 03/08/2023    HGBA1C 7.5 (H) 08/03/2022     Lipid panel   Lab Results   Component Value Date    CHOL 167 07/01/2023    CHOL 170 03/08/2023    CHOL 230 (H) 02/04/2022     Lab Results   Component Value Date    HDL 46 07/01/2023    HDL 49 03/08/2023    HDL 50 02/04/2022     Lab Results   Component Value Date    LDLCALC 97.4 07/01/2023    LDLCALC 104.0 03/08/2023    LDLCALC 143.8 02/04/2022     Lab Results   Component Value Date    TRIG 118 07/01/2023    TRIG 85 03/08/2023    TRIG 181 (H) 02/04/2022     Lab Results   Component Value Date    CHOLHDL 27.5 07/01/2023    " CHOLHDL 28.8 03/08/2023    CHOLHDL 21.7 02/04/2022      CMP  Glucose   Date Value Ref Range Status   07/01/2023 226 (H) 70 - 110 mg/dL Final     BUN   Date Value Ref Range Status   07/01/2023 13 6 - 20 mg/dL Final     Creatinine   Date Value Ref Range Status   07/01/2023 0.8 0.5 - 1.4 mg/dL Final     eGFR if    Date Value Ref Range Status   02/04/2022 >60.0 >60 mL/min/1.73 m^2 Final     eGFR if non    Date Value Ref Range Status   02/04/2022 >60.0 >60 mL/min/1.73 m^2 Final     Comment:     Calculation used to obtain the estimated glomerular filtration  rate (eGFR) is the CKD-EPI equation.        AST   Date Value Ref Range Status   07/01/2023 23 10 - 40 U/L Final     ALT   Date Value Ref Range Status   07/01/2023 28 10 - 44 U/L Final     Microalbumin creatinine ratio:   Lab Results   Component Value Date    MICALBCREAT 10.9 03/08/2023       Review Of Systems:   Gen: Appetite good, 10 - 15  lbs weight loss,  + fatigue and weakness. Denies polydipsia.  Skin: Skin is intact and heals well, denies any rashes or hair changes.   EENT: Denies any acute visual disturbances, nor blurred vision.    Resp: Denies SOB or Dyspnea on exertion, denies cough.   Cardiac: Denies chest pain, palpitations, or swelling.   GI: Denies abdominal pain, nausea or vomiting, diarrhea, or constipation.   /GYN: Denies nocturia, nor burning, frequency or pain on urination.  MS/Neuro: + numbness/ tingling in BLE; some chronic pain in lower back and legs, history of sciatica - nothing recent.   Gait steady, speech clear  Psych: Denies drug/ETOH abuse, + hx of depression - is on zoloft.  Other systems: negative.    Physical Exam:   GENERAL: Well developed, well nourished in appearance.   PSYCH: AAOx3, appropriate mood and affect, pleasant expression, conversant, appears relaxed, well groomed.   EYES: PERRL, Conjunctiva and corneas clear  NECK: Soft and Supple, trachea midline  CHEST: Even, regular, and unlabored  respirations,  ABDOMEN: Soft, non-tender, non-distended.    VASCULAR: pedal pulses palpable bilaterally, no edema.  NEURO:  cranial nerves II - XII intact   MUSCULOSKELETAL: Good ROM, steady gait.   SKIN: Skin warm, dry, and intact     Assessment and Plan of Care:     Rayne was seen today for diabetes and follow-up.    Diagnoses and all orders for this visit:    Type 2 diabetes mellitus with hyperglycemia, with long-term current use of insulin  -     tirzepatide 7.5 mg/0.5 mL PnIj; Inject 7.5 mg into the skin every 7 days.           1. T2DM with hyperglycemia- Hgba1c goal is 7.5% or less without hypoglycemia - 8.4%--- > 14%--> 13.7%.---> 7.9% --> 7.1% --> 6.9%--> 6.4%-- up to 8.4% (average bg is 200 - 250's on cgm currently - she needs changes.   discussed DM, progression of disease, long term complications, CV risk factors and tx options.     Continue metformin 1000 bid.   Continue on omnipod 5 - if continues to need less insulin, discontinue use of the pod.   If you need even 10 units daily - could try tresiba 10 units once per day -   Continue on mounjaro - increase from 5mg to 7.5mg every week.   Farxiga gave her yeast infection - so will not try again yet...   No known history of pancreatitis or medullary thyroid cancer.   If you experience severe abdominal pain, nausea, or diarrhea - please call me or notify my office immediately.   Prefers to keep same regimen at present.   Advise compliance with ADA diet and encourage exercise- gave list.   Reviewed  hypoglycemia, s/s and appropriate tx. Have/get quick acting glucose tablets at hand.   Instructed to monitor Blood glucose 2 - 4x/day and bring meter/ log to every clinic visit.   Continue cgm therapy -dexcom g6 -   virtually and more closely monitor glucose readings  This enables me to make any necessary adjustments to the patients diabetes regimen while keeping the patient and staff safe during the COVID-19 PHE.    2. HTN controlled for most part - continue  meds as previously prescribed and monitor.   diovan 320mg tablet daily. norvasc 2.5 mg daily - refilled at prior visit.   Urine mac negative.     3. HLD- LDL goal < 100. at goal.   Currently on statin therapy- high dose - 80mg every HS. Should continue.   Will consider repatha in future if needed.    Refilled rx last visit.     4. Weight - BMI Body mass index is 33.08 kg/m².   Encourage Ada diet and exercise.   Continue glp + gip1 - continue mounjaro - increase from 5 to 7.5mg every week.     5. Renal Function - stable. Will monitor trends.     6. MNG - history of MNG - no compressive symptoms.   Last thyroid Ultrasound was in 2015 and FNA negative.   Would recommend repeat for surveillance. Will discuss at follow up visit.  Repeat tft's on next lab draw in 2 weeks.   tsh was slightly high - 4.5 - if still elevated can treat with synthroid 50 daily - adult onset hypothyroidism.     7. S/p Right MCA stroke - on plavix and aspirin and high dose statin now.   Discussed CV risk factors.   (A1c wa at 14% at that piont).      8. Lumbar stenosis - has been recommended spine surgery. Doesn't want surgery for now - concerned as her family members had bad experiences.   I recommended possible spinal injections - placed consult for pain management. She is going. Now in physical therapy.   Still having sciatica down her right leg despite nsaids and therapy.     Labs and OV in 2 months time.

## 2023-09-22 ENCOUNTER — TELEPHONE (OUTPATIENT)
Dept: INTERNAL MEDICINE | Facility: CLINIC | Age: 58
End: 2023-09-22
Payer: COMMERCIAL

## 2023-09-22 NOTE — TELEPHONE ENCOUNTER
----- Message from Vicky Lane sent at 9/22/2023 12:50 PM CDT -----  Contact: pt [phone]  Requesting appt to be changed to 10/24/2023.    Please call and advise.    Thank You

## 2023-10-14 ENCOUNTER — LAB VISIT (OUTPATIENT)
Dept: LAB | Facility: HOSPITAL | Age: 58
End: 2023-10-14
Attending: NURSE PRACTITIONER
Payer: COMMERCIAL

## 2023-10-14 DIAGNOSIS — Z79.4 TYPE 2 DIABETES MELLITUS WITH HYPERGLYCEMIA, WITH LONG-TERM CURRENT USE OF INSULIN: ICD-10-CM

## 2023-10-14 DIAGNOSIS — E11.65 TYPE 2 DIABETES MELLITUS WITH HYPERGLYCEMIA, WITH LONG-TERM CURRENT USE OF INSULIN: ICD-10-CM

## 2023-10-14 LAB
ALBUMIN SERPL BCP-MCNC: 3.9 G/DL (ref 3.5–5.2)
ALP SERPL-CCNC: 86 U/L (ref 55–135)
ALT SERPL W/O P-5'-P-CCNC: 23 U/L (ref 10–44)
ANION GAP SERPL CALC-SCNC: 10 MMOL/L (ref 8–16)
AST SERPL-CCNC: 19 U/L (ref 10–40)
BILIRUB SERPL-MCNC: 0.5 MG/DL (ref 0.1–1)
BUN SERPL-MCNC: 17 MG/DL (ref 6–20)
CALCIUM SERPL-MCNC: 9.8 MG/DL (ref 8.7–10.5)
CHLORIDE SERPL-SCNC: 109 MMOL/L (ref 95–110)
CHOLEST SERPL-MCNC: 188 MG/DL (ref 120–199)
CHOLEST/HDLC SERPL: 3.9 {RATIO} (ref 2–5)
CO2 SERPL-SCNC: 24 MMOL/L (ref 23–29)
CREAT SERPL-MCNC: 0.8 MG/DL (ref 0.5–1.4)
EST. GFR  (NO RACE VARIABLE): >60 ML/MIN/1.73 M^2
ESTIMATED AVG GLUCOSE: 169 MG/DL (ref 68–131)
GLUCOSE SERPL-MCNC: 192 MG/DL (ref 70–110)
HBA1C MFR BLD: 7.5 % (ref 4–5.6)
HDLC SERPL-MCNC: 48 MG/DL (ref 40–75)
HDLC SERPL: 25.5 % (ref 20–50)
LDLC SERPL CALC-MCNC: 115.6 MG/DL (ref 63–159)
NONHDLC SERPL-MCNC: 140 MG/DL
POTASSIUM SERPL-SCNC: 4.7 MMOL/L (ref 3.5–5.1)
PROT SERPL-MCNC: 7.2 G/DL (ref 6–8.4)
SODIUM SERPL-SCNC: 143 MMOL/L (ref 136–145)
TRIGL SERPL-MCNC: 122 MG/DL (ref 30–150)

## 2023-10-14 PROCEDURE — 83036 HEMOGLOBIN GLYCOSYLATED A1C: CPT | Performed by: NURSE PRACTITIONER

## 2023-10-14 PROCEDURE — 80061 LIPID PANEL: CPT | Performed by: NURSE PRACTITIONER

## 2023-10-14 PROCEDURE — 80053 COMPREHEN METABOLIC PANEL: CPT | Performed by: NURSE PRACTITIONER

## 2023-10-14 PROCEDURE — 36415 COLL VENOUS BLD VENIPUNCTURE: CPT | Mod: PO | Performed by: NURSE PRACTITIONER

## 2023-10-20 ENCOUNTER — PATIENT MESSAGE (OUTPATIENT)
Dept: RADIOLOGY | Facility: HOSPITAL | Age: 58
End: 2023-10-20
Payer: COMMERCIAL

## 2023-10-24 ENCOUNTER — OFFICE VISIT (OUTPATIENT)
Dept: INTERNAL MEDICINE | Facility: CLINIC | Age: 58
End: 2023-10-24
Payer: COMMERCIAL

## 2023-10-24 ENCOUNTER — HOSPITAL ENCOUNTER (OUTPATIENT)
Dept: RADIOLOGY | Facility: HOSPITAL | Age: 58
Discharge: HOME | End: 2023-10-24
Attending: HOSPITALIST
Payer: COMMERCIAL

## 2023-10-24 VITALS
TEMPERATURE: 97 F | WEIGHT: 179.88 LBS | BODY MASS INDEX: 31.87 KG/M2 | HEART RATE: 80 BPM | RESPIRATION RATE: 16 BRPM | DIASTOLIC BLOOD PRESSURE: 100 MMHG | OXYGEN SATURATION: 97 % | SYSTOLIC BLOOD PRESSURE: 130 MMHG | HEIGHT: 63 IN

## 2023-10-24 VITALS — HEIGHT: 63 IN | BODY MASS INDEX: 32.96 KG/M2 | WEIGHT: 186 LBS

## 2023-10-24 DIAGNOSIS — Z79.4 TYPE 2 DIABETES MELLITUS WITH HYPERGLYCEMIA, WITH LONG-TERM CURRENT USE OF INSULIN: Primary | ICD-10-CM

## 2023-10-24 DIAGNOSIS — E11.59 HYPERTENSION ASSOCIATED WITH DIABETES: ICD-10-CM

## 2023-10-24 DIAGNOSIS — E11.65 TYPE 2 DIABETES MELLITUS WITH HYPERGLYCEMIA, WITH LONG-TERM CURRENT USE OF INSULIN: Primary | ICD-10-CM

## 2023-10-24 DIAGNOSIS — I15.2 HYPERTENSION ASSOCIATED WITH DIABETES: ICD-10-CM

## 2023-10-24 DIAGNOSIS — E11.69 HYPERLIPIDEMIA ASSOCIATED WITH TYPE 2 DIABETES MELLITUS: ICD-10-CM

## 2023-10-24 DIAGNOSIS — Z78.9 IMPAIRED INSTRUMENTAL ACTIVITIES OF DAILY LIVING (IADL): ICD-10-CM

## 2023-10-24 DIAGNOSIS — Z12.31 ENCOUNTER FOR SCREENING MAMMOGRAM FOR BREAST CANCER: ICD-10-CM

## 2023-10-24 DIAGNOSIS — E66.09 CLASS 1 OBESITY DUE TO EXCESS CALORIES WITH SERIOUS COMORBIDITY AND BODY MASS INDEX (BMI) OF 33.0 TO 33.9 IN ADULT: ICD-10-CM

## 2023-10-24 DIAGNOSIS — E78.5 HYPERLIPIDEMIA ASSOCIATED WITH TYPE 2 DIABETES MELLITUS: ICD-10-CM

## 2023-10-24 PROCEDURE — 4010F PR ACE/ARB THEARPY RXD/TAKEN: ICD-10-PCS | Mod: CPTII,S$GLB,, | Performed by: NURSE PRACTITIONER

## 2023-10-24 PROCEDURE — 3072F LOW RISK FOR RETINOPATHY: CPT | Mod: CPTII,S$GLB,, | Performed by: NURSE PRACTITIONER

## 2023-10-24 PROCEDURE — 3061F PR NEG MICROALBUMINURIA RESULT DOCUMENTED/REVIEW: ICD-10-PCS | Mod: CPTII,S$GLB,, | Performed by: NURSE PRACTITIONER

## 2023-10-24 PROCEDURE — 1159F PR MEDICATION LIST DOCUMENTED IN MEDICAL RECORD: ICD-10-PCS | Mod: CPTII,S$GLB,, | Performed by: NURSE PRACTITIONER

## 2023-10-24 PROCEDURE — 3080F DIAST BP >= 90 MM HG: CPT | Mod: CPTII,S$GLB,, | Performed by: NURSE PRACTITIONER

## 2023-10-24 PROCEDURE — 99999 PR PBB SHADOW E&M-EST. PATIENT-LVL V: CPT | Mod: PBBFAC,,, | Performed by: NURSE PRACTITIONER

## 2023-10-24 PROCEDURE — 3066F NEPHROPATHY DOC TX: CPT | Mod: CPTII,S$GLB,, | Performed by: NURSE PRACTITIONER

## 2023-10-24 PROCEDURE — 77063 BREAST TOMOSYNTHESIS BI: CPT | Mod: 26,,, | Performed by: RADIOLOGY

## 2023-10-24 PROCEDURE — 4010F ACE/ARB THERAPY RXD/TAKEN: CPT | Mod: CPTII,S$GLB,, | Performed by: NURSE PRACTITIONER

## 2023-10-24 PROCEDURE — 3051F PR MOST RECENT HEMOGLOBIN A1C LEVEL 7.0 - < 8.0%: ICD-10-PCS | Mod: CPTII,S$GLB,, | Performed by: NURSE PRACTITIONER

## 2023-10-24 PROCEDURE — 3075F SYST BP GE 130 - 139MM HG: CPT | Mod: CPTII,S$GLB,, | Performed by: NURSE PRACTITIONER

## 2023-10-24 PROCEDURE — 99215 OFFICE O/P EST HI 40 MIN: CPT | Mod: S$GLB,,, | Performed by: NURSE PRACTITIONER

## 2023-10-24 PROCEDURE — 3008F BODY MASS INDEX DOCD: CPT | Mod: CPTII,S$GLB,, | Performed by: NURSE PRACTITIONER

## 2023-10-24 PROCEDURE — 1160F RVW MEDS BY RX/DR IN RCRD: CPT | Mod: CPTII,S$GLB,, | Performed by: NURSE PRACTITIONER

## 2023-10-24 PROCEDURE — 3051F HG A1C>EQUAL 7.0%<8.0%: CPT | Mod: CPTII,S$GLB,, | Performed by: NURSE PRACTITIONER

## 2023-10-24 PROCEDURE — 3066F PR DOCUMENTATION OF TREATMENT FOR NEPHROPATHY: ICD-10-PCS | Mod: CPTII,S$GLB,, | Performed by: NURSE PRACTITIONER

## 2023-10-24 PROCEDURE — 3072F PR LOW RISK FOR RETINOPATHY: ICD-10-PCS | Mod: CPTII,S$GLB,, | Performed by: NURSE PRACTITIONER

## 2023-10-24 PROCEDURE — 1160F PR REVIEW ALL MEDS BY PRESCRIBER/CLIN PHARMACIST DOCUMENTED: ICD-10-PCS | Mod: CPTII,S$GLB,, | Performed by: NURSE PRACTITIONER

## 2023-10-24 PROCEDURE — 77063 MAMMO DIGITAL SCREENING BILAT WITH TOMO: ICD-10-PCS | Mod: 26,,, | Performed by: RADIOLOGY

## 2023-10-24 PROCEDURE — 95251 CONT GLUC MNTR ANALYSIS I&R: CPT | Mod: S$GLB,,, | Performed by: NURSE PRACTITIONER

## 2023-10-24 PROCEDURE — 99215 PR OFFICE/OUTPT VISIT, EST, LEVL V, 40-54 MIN: ICD-10-PCS | Mod: S$GLB,,, | Performed by: NURSE PRACTITIONER

## 2023-10-24 PROCEDURE — 77067 MAMMO DIGITAL SCREENING BILAT WITH TOMO: ICD-10-PCS | Mod: 26,,, | Performed by: RADIOLOGY

## 2023-10-24 PROCEDURE — 99999 PR PBB SHADOW E&M-EST. PATIENT-LVL V: ICD-10-PCS | Mod: PBBFAC,,, | Performed by: NURSE PRACTITIONER

## 2023-10-24 PROCEDURE — 77067 SCR MAMMO BI INCL CAD: CPT | Mod: TC

## 2023-10-24 PROCEDURE — 3008F PR BODY MASS INDEX (BMI) DOCUMENTED: ICD-10-PCS | Mod: CPTII,S$GLB,, | Performed by: NURSE PRACTITIONER

## 2023-10-24 PROCEDURE — 3061F NEG MICROALBUMINURIA REV: CPT | Mod: CPTII,S$GLB,, | Performed by: NURSE PRACTITIONER

## 2023-10-24 PROCEDURE — 1159F MED LIST DOCD IN RCRD: CPT | Mod: CPTII,S$GLB,, | Performed by: NURSE PRACTITIONER

## 2023-10-24 PROCEDURE — 95251 PR GLUCOSE MONITOR, 72 HOUR, PHYS INTERP: ICD-10-PCS | Mod: S$GLB,,, | Performed by: NURSE PRACTITIONER

## 2023-10-24 PROCEDURE — 3075F PR MOST RECENT SYSTOLIC BLOOD PRESS GE 130-139MM HG: ICD-10-PCS | Mod: CPTII,S$GLB,, | Performed by: NURSE PRACTITIONER

## 2023-10-24 PROCEDURE — 77067 SCR MAMMO BI INCL CAD: CPT | Mod: 26,,, | Performed by: RADIOLOGY

## 2023-10-24 PROCEDURE — 3080F PR MOST RECENT DIASTOLIC BLOOD PRESSURE >= 90 MM HG: ICD-10-PCS | Mod: CPTII,S$GLB,, | Performed by: NURSE PRACTITIONER

## 2023-10-24 RX ORDER — PEN NEEDLE, DIABETIC 30 GX3/16"
1 NEEDLE, DISPOSABLE MISCELLANEOUS EVERY MORNING
Qty: 100 EACH | Refills: 3 | Status: SHIPPED | OUTPATIENT
Start: 2023-10-24

## 2023-10-24 RX ORDER — INSULIN GLARGINE 300 U/ML
10 INJECTION, SOLUTION SUBCUTANEOUS DAILY
Qty: 18 ML | Refills: 3 | Status: SHIPPED | OUTPATIENT
Start: 2023-10-24

## 2023-10-24 NOTE — PATIENT INSTRUCTIONS
Continue metformin 1000mg twice per day -   Continue mounjaro - increase dose from 7.5 to 10 mg every week -   You can try stopping the omnipod 5 - and see how your glucoses go without insulin -   If you need insulin again (like if you notice your glucoses)

## 2023-10-24 NOTE — PROGRESS NOTES
58 y.o. female here for routine follow up for management of T2dm -   Last seen august 2023 -     A1c is at 7.5% currently -   Continues on metformin 1000mg twice per day -   Also on mounjaro once per week - 7.5mg injection every week =- tolerating well.   She has lost another 10+ lbs. Was 194lbs in June 2023 - down to 179 lbs.    She is very happy with his results. Moving more/exercising more.   Continues on her omnipod 5 with her dexcom g6 - humalog insulin.   Downloaded and reviewed today - glooko report -   Total daily dose is 6.8 units/day -   1 bolus on average per day -   66% is coming from basal rate.   34% coming from bolusing.   100% in automated mode.   Basal rate is 0.5 units/hour -   Carb ratio is 1: 10   ISF is at 1: 40 -   Bg target 110 -     Dexcom g6 - downloaded and reviewed today -   See scanned in  -   Over 72 hours of data reviewed.   Average glucose -   78% time in range.   0% lows.   21% highs.   1% extreme high.       Last visit notes as follows from August 2023 -   58 y.o. female here for 6 week follow up visit for management of T2dm -   Last seen July 2023 - those notes below.     A1c is last at 8.4% in July 2023 - no new labs for today's visit -   Continues on metformin 1000 mg twice per day.   Prior was on tresiba 30 units daily - (prior to this was on omnipod classic) -   I ordered omnipod dash at last visit, however she ended up with omnipod 5 instead.   Trained and switched to this - now only needing around 11 units of insulin per day.   Was prior on trulicity 3mg every week - switched her to mounjaro last month and she likes it -   Taking 5mg every week - less hungry and has lost around 10 lbs + more in 1 month's time.   Her sugar craving has dissapeared almost completely -   Sugar free puddings, more salads and veggies.     She is newly on omnipod 5 - pods from pharmacy   99% of the day in automated mode -   Total daily dose is 11.8 units/day -   72% is basal rate.   28%  is coming from bolusing.   Basal rate is at 0.7 units/hour -   Isf at 40 -   Carb ratio 1: 10 -   Bg target 110 -   Active insulin time is at 4 hours -   Bg correction 150 -     Newly on dexcom g6 - downloaded and reviewed today -   See scanned in  - she likes this much more than sami 14 day.   Average glucose 171 -   66% time in range.   0% lows.   29%   5% very highs.       Last visit notes from July 6th, 2023 -   58 y.o. female here for routine follow up for management of T2dm -   Last seen in 2022 - those notes below.     A1c up again trending higher at 8.4%  (Was 7.5% last year and then 6.4% last year on insulin pump therapy).  Continues on metformin 1000 mg twice per day -   Continues on insulin - long acting - Tresiba 30 units every morning.   (Prior was on omnipod classic and we tried vgo prior to that).  Was on MDI prior to that even...    She liked having omnipod and did well on it - we tried to get dash approved, but never was successful.   She is on trulicity still 3mg every week - tolerated well.   We tried farxiga last year - helped a lot, however terrible yeast infection - so stopped taking it.   Admits not eating well.   Wants to get back on track.     Continues on sami 14 day - is scanning 1 - 2 times per day on average - having to pay cash for cgm sensors -   See scanned in  - pays for sensors at the pharmacy - cash pay.   (She's only scanning/checking 1 - 2 times per day at most).   Average glucose 212 -   26% glucose only time in range.   54% highs.   20% very highs.   0% low blood sugars.     Still working full time.   Not exercising.     Last visit notes from 2022 as follow   57 y.o. female, here for 6 month follow up for management of T2dm   Last seen in February 2022 - those notes are below.     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   She has new insurance - but not sure omnipod insulin pump is covered on her current plan -   She was on on omnipod classic prior visits.   vgo 20 prior to that.   The meal time insulin really seemed to help.     Stopped using omnipod when she ran out of pods - around April/may 2022 - she called me to notify me.   We tried to get omnipod dash covered without success.  I switched her to long acting insulin once per day at that point as she really wasn't needing much insulin (around 23 units/day).   Right now she is taking Tresiba 30 units once every morning.   Also on trulicity 3 mg SC every week - takes on thursdays -   Continues metformin 1000 mg twice per day.   We had not tried an sglt2i yet - she had been concerned about cost.   She has good renal function. And no CI's to this drug class.     Admits lately not following ADA diet.   Has gained some weight. But she is motivated to lose weight again.   She'd like to get off of insulin if possible.     Free style sami - personal.   Downloaded and reviewed today -   See scanned in .   Average glucose 161   70% in target range.   28% highs.   2% extreme highs.   No lows.       Last visit notes as follows from February 2022   57 y.o. female, here for routine 3 month follow up visit - for management of T2dm -   Last seen in November 2021 - those notes are below.     a1c is decreased from 6.9% to 6.4%.   Reports she has gained weight from holiday times - wasn't eating that great.   However is trying to restart better habits.   History of stroke (right MCA) -  impaired mobility at times - however she is back to work now.     Continues on metformin 1000 bid.    trulicity 3mg every week.   Had tried to add her on sglt2i in past, but it was too expensive and she really isn't privy to adding more medications at this time.   Switched her from vgo to omnipod - she is on older  "version (not dash) - gets through dme.   Was on novolog and now needs humalog rx today - her insurance will only cover.     omnipod downloaded - on glooko today -   Total daily dose is 23.2 units/day. She is needing much less insulin.   69% of insulin is coming from basal rate.   31% of insulin is coming from boluses.   Using pre-set boluses -   Basal rate is at 0.7 units/hour.   Active insulin time is set at 4 hours.  ISF set at 0 - does preset boluses - 4 or 6 units.   2 units for a snack.     On free style sami - downloaded and reviewed today -   Average glucose is 145   a1c estimated @ 6.8%   81% time in range.   0% lows.   17% highs.   2% extreme highs.     History of hyperthyroidism borderline in the past - her tft's have been stable since -   Her tsh today is slightly elevated at 4.588 - reports she is feeling sometimes fatigued.   Sometimes having night sweats, and has also gained weight over the past few months.   She is on zoloft - had dose increased and this has helped.       Last visit notes as follows from 11/2021:   56 y.o. female, here for follow up visit for management of T2dm -   Last seen 7/8/21 - those notes are below.     a1c more controlled @ 6.9%.   Trulicity 3 mg every week. No side effects (we had just increased her from the 1.5mg to 3mg every week).   Also on metformin 1000 mg twice per day.   On vgo 20 - 3 clicks with meal for most part - sometimes gives extra click for a "correction" if the bg is > 180 -   1 click with snack on occasion.   Using sami  - she met with diabetic educator -   She just downloaded it on her phone today - forgot her reader.   So not much data.   Average bg is 147 -   100% tir - see report scanned in .     Reports that she does have some lower sugar readings over night - sometimes 70 - 80's   But most of the time 100 - 130's during the day.   Not exercising a lot - has some back issues/so prohibited a bit from this.   Continuing on gabapentin which helps with her " "neuropathy in legs.   Also taking diclofenac 2 times per day which helps.   She is trying to get more active and is walking her dogs (she has 3 of them ) 3 times per day for walks.       Last visit notes as follows from 7/8/2021:   56 y.o. female, here for 3 month follow up visit for management of T2dm   Last seen April 2021 - those notes below.     a1c now improved @ 7.1%.   She is on metformin 1000 bid.   trulicity 1.5mg every week   And vgo 20 -     Continues on freestyle sami personal cgm -   Downloaded and reviewed today - see scanned in . -   Average bg is 143 -   a1c estimated at 6.7%.   85% TIR  0% lows.   1% very lows - 1 occasion.   13% highs.   1% extreme highs.         Last visit notes as follows from April 2021:  56 y.o. female here for follow up visit for T2dm, last seen 2/25/2021 - thos notes are blow.     a1c was last @ 13.7% and is now estimated at 6.2% on her cgm.   Labs reveal a1c currently is at 7.9%.   She has great trends. Feeling better. She is extremely happy with her results and quick turn around improvement.   Eating healthier, history of a stroke, but very motivated and refuses to 'be sick again".     Patient continues on metformin 1000 mg twice per day.   Also on trulicity 1.5mg every week.   In the interim, I switched her from MDI to vgo 20 - doing 3 clicks with meals tid.   Occasional clicks with snacks.   Met with tuyet sanchez and trained on vgo 3/5/2021 -     Had tried to start her on farxiga or jardiance, but too expensive on her plan,   And prefers to stay on same regimen.     Free style sami - interpreted today and see scanned in to .   Average glucose is 119.   a1c estimated at 6.2%.   91% time in range.   3% lows (happening after meal time boluses and over night).   6%  Highs.       Last OV notes as follows:   56 y.o. female, here for 6 week follow up for T2dm management.   Last seen 1/26/2021 - those notes below.     a1c had been at 13.7%, but she had " stopped her mealt kasia insulin and her glp1 before seeing me.   Continues on metformin 1000mg twice per day.   She is now on trulicity 1.5mg every week on fridays. Tolerating well - she's had some weight loss - 5 lbs since last visit.   Feels it does suppress appetite somewhat.   Continues on Levemir 30 units every night now. (was on lower amount 25 units).   She is back on novolog prior to meals - tid - 6 units. Reports it is difficult however to remember meal time boluses.   She is on injections 4 times per day. titrates her insulin based on self testing results.   She if finger sticking. dexcom was approved. But cost is high on her current insurance plan as she hasn't met her deductible yet.   States sugars are improved - no logs with her today -   States sugars fasting are running 170 - 200's now, so improved from 300's from past visit.   No acute complaints today's visit.       Last visit notes from 1/26/2021 as follows:   HPI: Rayne Aaron is a 58 y.o.  female c/I for visit to address Diabetes Type 2  This is the first time I am seeing her for care, follows with Dr. Ware, Deb PENA MD for primary care needs.   She has seen Donna George NP in the past for management for her diabetes. At [place] [facility].   She has moved closer to [facility], so wanted to switch providers.     was diagnosed with T2DM in 2009.    Her father, paternal grandparents and brothers and sisters all have T2dm. (9 siblings total)  Denies missing doses of DM medication.   Has been on and off medications through the years -  was on insulin, then controlled and taken off.   Also reports stopping/ran out last year in 2019.     Now has restarted her insulin over the past month following a stroke.   Most recently was diagnosed with stroke in December 2020.   No deficits. She has recovered and is back working at home.   She is motivated to get her diabetes under control and prevent further progression of her diabetes.   She does not want  another stroke.     Denies any other complications from diabetes   Denies nephropathy.   Denies Diabetic retinopathy.   + HTN, + HLD.   + stroke - now on aspirin, plavix and statin.     Last saw Donna George NP in 4/2019 -   Her a1c was > 14% at that time. And she had been off of her insulin at that time for about 3 months.   Current a1c is still high at 13.7%.   Had been ordered a vgo patch, but patient states she is not sure that it was ever approved.   She also had wanted to be on a cgm to avoid the frequent fingersticks, but states her insurance company never approved.      Current regimen:  Levemir 26 U HS  (previously on novolog insulin 8 units with meals - but ran out of rx. Hospital did not restart).   Metformin 1000 mg BID  Was on trulicity weekly and ozempic at one point weekly - but ran out, then insurance stopped approving.   Denies any known side effects to GLP1's.     She is checking her glucoses 4 times per day.   Reports history of hypoglycemic episodes and unawareness.   No bg logs - but reports sugars range from 250 - 350's overall.       Past medical History:   Past Medical History:   Diagnosis Date    Diabetes mellitus, type 2     Hyperlipidemia     Hypertension     Multinodular goiter 10/21/2015    Stroke     Subclinical hyperthyroidism 10/21/2015      Family hx:   Family History   Problem Relation Age of Onset    Dementia Mother     Heart disease Mother     Hypertension Mother     Heart disease Father     Diabetes Father     Diabetes Maternal Grandmother     Hypertension Maternal Grandmother     Ovarian cancer Maternal Grandmother     Depression Maternal Grandfather     Diabetes Paternal Grandmother     Peripheral vascular disease Paternal Grandmother     Hyperthyroidism Sister     Breast cancer Neg Hx     Colon cancer Neg Hx     Thyroid cancer Neg Hx       Current meds:   Current Outpatient Medications:     amLODIPine (NORVASC) 2.5 MG tablet, Take 1 tablet (2.5 mg total) by mouth once  "daily., Disp: 90 tablet, Rfl: 3    aspirin (ECOTRIN) 81 MG EC tablet, Take 1 tablet (81 mg total) by mouth once daily., Disp: 180 tablet, Rfl: 2    atorvastatin (LIPITOR) 80 MG tablet, Take 1 tablet (80 mg total) by mouth every evening., Disp: 90 tablet, Rfl: 3    blood-glucose sensor (DEXCOM G6 SENSOR) Misty, 1 each by Misc.(Non-Drug; Combo Route) route every 10 days., Disp: 3 each, Rfl: 11    blood-glucose transmitter (DEXCOM G6 TRANSMITTER) Misty, 1 each by Misc.(Non-Drug; Combo Route) route Daily. Use transmitter in sensor with G6 system. Change new transmitter every 3 months., Disp: 1 each, Rfl: 3    gabapentin (NEURONTIN) 300 MG capsule, Take 1 capsule (300 mg total) by mouth 3 (three) times daily., Disp: 270 capsule, Rfl: 3    insulin aspart, niacinamide, (FIASP U-100 INSULIN) 100 unit/mL Soln, Inject 70 Units into the skin continuous. Use as directed with omnipod dash insulin pump, Disp: 20 mL, Rfl: 11    insulin glargine, TOUJEO, (TOUJEO SOLOSTAR U-300 INSULIN) 300 unit/mL (1.5 mL) InPn pen, Inject 10 Units into the skin once daily., Disp: 18 mL, Rfl: 3    metFORMIN (GLUCOPHAGE) 1000 MG tablet, Take 1 tablet (1,000 mg total) by mouth 2 (two) times daily with meals., Disp: 180 tablet, Rfl: 3    pen needle, diabetic 32 gauge x 5/32" Ndle, Inject 1 each into the skin every morning., Disp: 100 each, Rfl: 3    sertraline (ZOLOFT) 100 MG tablet, Take 1 tablet (100 mg total) by mouth once daily., Disp: 90 tablet, Rfl: 3    tirzepatide 10 mg/0.5 mL PnIj, Inject 10 mg into the skin every 7 days., Disp: 4 pen , Rfl: 4    valsartan (DIOVAN) 320 MG tablet, Take 1 tablet (320 mg total) by mouth once daily., Disp: 90 tablet, Rfl: 3     Current Diabetes medications:    Metformin 1000 mg po bid with food  Mounjaro 7.5mg every week.   Omnipod 5 - with dexcom g6 - in automated mode -      Medications Tried and Failed:   Had been on trulicity in past   ozempic in past.    Long acting insulin:  Levemir 30 units every HS.   Short " "acting insulin: novolog 6 units tid ac meals.  Was on VGO in past. Now switched to omnipod (insurance coverage).   novolog insulin via Omnipod pump (formerly on vgo).   Farxiga 5mg po daily - bad yeast infections -     trulicity 3 mg sc weekly.   Tresiba 30 units every morning  Review of Pertinent co-morbidities/risk factors:   CV: Denies history of MI. + stroke.   CAD: Denies.  Takes aspirin 81mg tablet daily and plavix also.   BP: has history of HTN  Statin: Taking  ACE/ARB: Taking    Social History     Tobacco Use   Smoking Status Never   Smokeless Tobacco Never     Social:   Lives at home with her son.   Life changes/stressors currently:  passed away in October 2020 - so has been stressed since then.   Just started new job in December.   Diet: not always following ADA diet   Meals: 3 per day and snacks.        Breakfast - 1/2 of a muffin or banana       Lunch - subway, sandwich at home. Veggies.        Dinner - fish, smoked brisquet, steak. Potatoes.        Snacks - sugar free chocolate pudding. Yogurt, mixed fruit        Drinks - diet pepsi, diet coke, water. Flavored arredondo  Exercise: walking dogs.   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   Was working from Home. .     Glucose Monitoring:   New personal Dexcom G6 - with omnipod 5   Personal free style sami CGM  Previoulsy, was Checking sugars 4x/day by fingerstick.   Gets supplies from pharmacy.   Was getting Omnipods (older version - classic ) - mailed to her - through dme - she is not sure which company.     Standards of care:   Eyes: .: 09/22/2022  Foot exam: : 03/08/2023   Diabetes education:  Yes - February 2021.    Vital Signs  BP (!) 130/100 (BP Location: Right arm, Patient Position: Sitting, BP Method: Medium (Manual))   Pulse 80   Temp 97.4 °F (36.3 °C) (Temporal)   Resp 16   Ht 5' 3" (1.6 m)   Wt 81.6 kg (179 lb 14.3 oz)   LMP 09/08/2015 (Approximate)   SpO2 97%  "  BMI 31.87 kg/m²     Pertinent Labs:   Hgba1c   Lab Results   Component Value Date    HGBA1C 7.5 (H) 10/14/2023    HGBA1C 8.4 (H) 07/01/2023    HGBA1C 8.3 (H) 03/08/2023     Lipid panel   Lab Results   Component Value Date    CHOL 188 10/14/2023    CHOL 167 07/01/2023    CHOL 170 03/08/2023     Lab Results   Component Value Date    HDL 48 10/14/2023    HDL 46 07/01/2023    HDL 49 03/08/2023     Lab Results   Component Value Date    LDLCALC 115.6 10/14/2023    LDLCALC 97.4 07/01/2023    LDLCALC 104.0 03/08/2023     Lab Results   Component Value Date    TRIG 122 10/14/2023    TRIG 118 07/01/2023    TRIG 85 03/08/2023     Lab Results   Component Value Date    CHOLHDL 25.5 10/14/2023    CHOLHDL 27.5 07/01/2023    CHOLHDL 28.8 03/08/2023      CMP  Glucose   Date Value Ref Range Status   10/14/2023 192 (H) 70 - 110 mg/dL Final     BUN   Date Value Ref Range Status   10/14/2023 17 6 - 20 mg/dL Final     Creatinine   Date Value Ref Range Status   10/14/2023 0.8 0.5 - 1.4 mg/dL Final     eGFR if    Date Value Ref Range Status   02/04/2022 >60.0 >60 mL/min/1.73 m^2 Final     eGFR if non    Date Value Ref Range Status   02/04/2022 >60.0 >60 mL/min/1.73 m^2 Final     Comment:     Calculation used to obtain the estimated glomerular filtration  rate (eGFR) is the CKD-EPI equation.        AST   Date Value Ref Range Status   10/14/2023 19 10 - 40 U/L Final     ALT   Date Value Ref Range Status   10/14/2023 23 10 - 44 U/L Final     Microalbumin creatinine ratio:   Lab Results   Component Value Date    MICALBCREAT Unable to calculate 10/14/2023       Review Of Systems:   Gen: Appetite good, 20  lbs weight loss+, no fatigue or weakness, Denies polydipsia.  Skin: Skin is intact and heals well, denies any rashes or hair changes.   EENT: Denies any acute visual disturbances, nor blurred vision.    Resp: Denies SOB or Dyspnea on exertion, denies cough.   Cardiac: Denies chest pain, palpitations, or  "swelling.   GI: Denies abdominal pain, nausea or vomiting, diarrhea, or constipation.   /GYN: Denies nocturia, nor burning, frequency or pain on urination.  MS/Neuro: + numbness/ tingling in BLE; some chronic pain in lower back and legs, history of sciatica - nothing recent.   Gait steady, speech clear  Psych: Denies drug/ETOH abuse, + hx of depression - is on zoloft.  Other systems: negative.    Physical Exam:   GENERAL: Well developed, well nourished in appearance.   PSYCH: AAOx3, appropriate mood and affect, pleasant expression, conversant, appears relaxed, well groomed.   EYES: PERRL, Conjunctiva and corneas clear  NECK: Soft and Supple, trachea midline  CHEST: Even, regular, and unlabored respirations,  ABDOMEN: Soft, non-tender, non-distended.    VASCULAR: pedal pulses palpable bilaterally, no edema.  NEURO:  cranial nerves II - XII intact   MUSCULOSKELETAL: Good ROM, steady gait.   SKIN: Skin warm, dry, and intact     Assessment and Plan of Care:     Rayne was seen today for follow-up.    Diagnoses and all orders for this visit:    Type 2 diabetes mellitus with hyperglycemia, with long-term current use of insulin  -     tirzepatide 10 mg/0.5 mL PnIj; Inject 10 mg into the skin every 7 days.  -     insulin glargine, TOUJEO, (TOUJEO SOLOSTAR U-300 INSULIN) 300 unit/mL (1.5 mL) InPn pen; Inject 10 Units into the skin once daily.  -     pen needle, diabetic 32 gauge x 5/32" Ndle; Inject 1 each into the skin every morning.  -     Hemoglobin A1C; Future  -     Microalbumin/Creatinine Ratio, Urine; Future  -     Comprehensive Metabolic Panel; Future    Hyperlipidemia associated with type 2 diabetes mellitus  -     Lipid Panel; Future    Hypertension associated with diabetes    Impaired instrumental activities of daily living (IADL)    Class 1 obesity due to excess calories with serious comorbidity and body mass index (BMI) of 33.0 to 33.9 in adult             1. T2DM with hyperglycemia- Hgba1c goal is 7.5% or less " without hypoglycemia - 8.4%--- > 14%--> 13.7%.---> 7.9% --> 7.1% --> 6.9%--> 6.4%--  8.4% --> 7.5%   discussed DM, progression of disease, long term complications, CV risk factors and tx options.     Continue metformin 1000 bid.   Stop omnipod and do/try toujeo 10 untis daily for now. (She's barely needing any insulin at all. Mounjaro has helped as well as the weight loss)  Continue on mounjaro - increase from 7.5mg to 10mg every week.   Farxiga gave her yeast infection - so will not try again yet...   No known history of pancreatitis or medullary thyroid cancer.   If you experience severe abdominal pain, nausea, or diarrhea - please call me or notify my office immediately.   Prefers to keep same regimen at present.   Advise compliance with ADA diet and encourage exercise- gave list.   Reviewed  hypoglycemia, s/s and appropriate tx. Have/get quick acting glucose tablets at hand.   Instructed to monitor Blood glucose 2 - 4x/day and bring meter/ log to every clinic visit.   Continue cgm therapy -dexcom g6 -   virtually and more closely monitor glucose readings  This enables me to make any necessary adjustments to the patients diabetes regimen while keeping the patient and staff safe during the COVID-19 PHE.    2. HTN controlled for most part - continue meds as previously prescribed and monitor.   diovan 320mg tablet daily. norvasc 2.5 mg daily - refilled at prior visit.   Urine mac negative.     3. HLD- LDL goal < 100. at goal.   Currently on statin therapy- high dose - 80mg every HS. Should continue.   Will consider repatha in future if needed.    Refilled rx last visit.     4. Weight - BMI Body mass index is 31.87 kg/m².   Encourage Ada diet and exercise.   Continue glp + gip1 - continue mounjaro - increase from 7.5mg to 10mg every week.     5. Renal Function - stable. Will monitor trends.     6. MNG - history of MNG - no compressive symptoms.   Last thyroid Ultrasound was in 2015 and FNA negative.   Would  recommend repeat for surveillance. Will discuss at follow up visit.  Repeat tft's on next lab draw in 2 weeks.   tsh was slightly high - 4.5 - if still elevated can treat with synthroid 50 daily - adult onset hypothyroidism.     7. S/p Right MCA stroke - on plavix and aspirin and high dose statin now.   Discussed CV risk factors.   (A1c wa at 14% at that piont).      8. Lumbar stenosis - has been recommended spine surgery. Doesn't want surgery for now - concerned as her family members had bad experiences.   I recommended possible spinal injections - placed consult for pain management. She is going. Now in physical therapy.   Still having sciatica down her right leg despite nsaids and therapy.   Feeling better today's visit -       Labs and OV in 3 months time.             BMP/EKG/CXR/Results in MD note/COVID-19

## 2023-10-26 ENCOUNTER — PATIENT OUTREACH (OUTPATIENT)
Dept: ADMINISTRATIVE | Facility: HOSPITAL | Age: 58
End: 2023-10-26
Payer: COMMERCIAL

## 2023-10-26 ENCOUNTER — PATIENT MESSAGE (OUTPATIENT)
Dept: ADMINISTRATIVE | Facility: HOSPITAL | Age: 58
End: 2023-10-26
Payer: COMMERCIAL

## 2023-10-26 NOTE — PROGRESS NOTES
Chart reviewed  Immunizations reconciled   Poral message sent regarding blood pressure    Population Health Chart Review & Patient Outreach Details      Further Action Needed If Patient Returns Outreach:        Health Maintenance Due   Topic Date Due    Shingles Vaccine (1 of 2) Never done    Mammogram  2023    Influenza Vaccine (1) 2023    COVID-19 Vaccine (2 -  season) 2023    Eye Exam  2023           Updates Requested / Reviewed:     [x]  Care Everywhere    []     []  External Sources ([facility], [facility], [facility], etc.)    [] [facility]   [] [facility]   [] Other:    []  Care Team Updated   []  Removed  or Duplicate Orders   [x]  Immunization Reconciliation Completed / Queried    [] [place]   [] [place]   [] [place]   [] [place]      Health Maintenance Topics Addressed and Outreach Outcomes / Actions Taken:             Breast Cancer Screening []  Mammogram Order Placed    []  Mammogram Screening Scheduled    []  External Records Requested & Care Team Updated if Applicable    []  External Records Uploaded & Care Team Updated if Applicable    []  Pt Declined Scheduling Mammogram    []  Pt Will Schedule with External Provider / Order Routed & Care Team Updated if Applicable              Cervical Cancer Screening []  Pap Smear Scheduled in Primary Care or OBGYN    []  External Records Requested & Care Team Updated if Applicable       []  External Records Uploaded, Care Team Updated, & History Updated if Applicable    []  Patient Declined Scheduling Pap Smear    []  Patient Will Schedule with External Provider & Care Team Updated if Applicable                  Colorectal Cancer Screening []  Colonoscopy Case Request / Referral / Home Test Order Placed    []  External Records Requested & Care Team Updated if Applicable    []  External Records Uploaded, Care Team Updated, & History Updated if Applicable    []  Patient Declined Completing Colon Cancer Screening    []  Patient Will  Schedule with External Provider & Care Team Updated if Applicable    []  Fit Kit Mailed (add the SmartPhrase under additional notes)    []  Reminded Patient to Complete Home Test                Diabetic Eye Exam []  Eye Exam Screening Order Placed    []  Eye Camera Scheduled or Optometry/Ophthalmology Referral Placed    []  External Records Requested & Care Team Updated if Applicable    []  External Records Uploaded, Care Team Updated, & History Updated if Applicable    []  Patient Declined Scheduling Eye Exam    []  Patient Will Schedule with External Provider & Care Team Updated if Applicable             Blood Pressure Control []  Primary Care Follow Up Visit Scheduled     []  Remote Blood Pressure Reading Captured    []  Patient Declined Remote Reading or Scheduling Appt - Escalated to PCP    [x]  Patient Will Call Back or Send Portal Message with Reading                 HbA1c & Other Labs []  Overdue Lab(s) Ordered    []  Overdue Lab(s) Scheduled    []  External Records Uploaded & Care Team Updated if Applicable    []  Primary Care Follow Up Visit Scheduled     []  Reminded Patient to Complete A1c Home Test    []  Patient Declined Scheduling Labs or Will Call Back to Schedule    []  Patient Will Schedule with External Provider / Order Routed, & Care Team Updated if Applicable           Primary Care Appointment []  Primary Care Appt Scheduled    []  Patient Declined Scheduling or Will Call Back to Schedule    []  Pt Established with External Provider, Updated Care Team, & Informed Pt to Notify Payor if Applicable           Medication Adherence /    Statin Use []  Primary Care Appointment Scheduled    []  Patient Reminded to  Prescription    []  Patient Declined, Provider Notified if Needed    []  Sent Provider Message to Review to Evaluate Pt for Statin, Add Exclusion Dx Codes, Document   Exclusion in Problem List, Change Statin Intensity Level to Moderate or High Intensity if Applicable                 Osteoporosis Screening []  Dexa Order Placed    []  Dexa Appointment Scheduled    []  External Records Requested & Care Team Updated    []  External Records Uploaded, Care Team Updated, & History Updated if Applicable    []  Patient Declined Scheduling Dexa or Will Call Back to Schedule    []  Patient Will Schedule with External Provider / Order Routed & Care Team Updated if Applicable       Additional Notes:

## 2023-11-27 ENCOUNTER — TELEPHONE (OUTPATIENT)
Dept: INTERNAL MEDICINE | Facility: CLINIC | Age: 58
End: 2023-11-27
Payer: COMMERCIAL

## 2024-01-05 ENCOUNTER — PATIENT MESSAGE (OUTPATIENT)
Dept: INTERNAL MEDICINE | Facility: CLINIC | Age: 59
End: 2024-01-05
Payer: COMMERCIAL

## 2024-01-05 DIAGNOSIS — Z79.4 TYPE 2 DIABETES MELLITUS WITH HYPERGLYCEMIA, WITH LONG-TERM CURRENT USE OF INSULIN: Primary | ICD-10-CM

## 2024-01-05 DIAGNOSIS — E11.65 TYPE 2 DIABETES MELLITUS WITH HYPERGLYCEMIA, WITH LONG-TERM CURRENT USE OF INSULIN: Primary | ICD-10-CM

## 2024-01-08 NOTE — TELEPHONE ENCOUNTER
Sara has commerical insurance coverage, [employer], which looks to cove [email or web address].   Resent to an [facility] pharmacy to check on coverage.

## 2024-01-10 ENCOUNTER — PATIENT OUTREACH (OUTPATIENT)
Dept: ADMINISTRATIVE | Facility: HOSPITAL | Age: 59
End: 2024-01-10
Payer: COMMERCIAL

## 2024-01-11 ENCOUNTER — TELEPHONE (OUTPATIENT)
Dept: INTERNAL MEDICINE | Facility: CLINIC | Age: 59
End: 2024-01-11
Payer: COMMERCIAL

## 2024-01-11 NOTE — PROGRESS NOTES

## 2024-01-20 ENCOUNTER — LAB VISIT (OUTPATIENT)
Dept: LAB | Facility: HOSPITAL | Age: 59
End: 2024-01-20
Payer: COMMERCIAL

## 2024-01-20 DIAGNOSIS — Z79.4 TYPE 2 DIABETES MELLITUS WITH HYPERGLYCEMIA, WITH LONG-TERM CURRENT USE OF INSULIN: ICD-10-CM

## 2024-01-20 DIAGNOSIS — E11.69 HYPERLIPIDEMIA ASSOCIATED WITH TYPE 2 DIABETES MELLITUS: ICD-10-CM

## 2024-01-20 DIAGNOSIS — E78.5 HYPERLIPIDEMIA ASSOCIATED WITH TYPE 2 DIABETES MELLITUS: ICD-10-CM

## 2024-01-20 DIAGNOSIS — E11.65 TYPE 2 DIABETES MELLITUS WITH HYPERGLYCEMIA, WITH LONG-TERM CURRENT USE OF INSULIN: ICD-10-CM

## 2024-01-20 LAB
ALBUMIN SERPL BCP-MCNC: 3.9 G/DL (ref 3.5–5.2)
ALP SERPL-CCNC: 84 U/L (ref 55–135)
ALT SERPL W/O P-5'-P-CCNC: 18 U/L (ref 10–44)
ANION GAP SERPL CALC-SCNC: 9 MMOL/L (ref 8–16)
AST SERPL-CCNC: 15 U/L (ref 10–40)
BILIRUB SERPL-MCNC: 0.4 MG/DL (ref 0.1–1)
BUN SERPL-MCNC: 18 MG/DL (ref 6–20)
CALCIUM SERPL-MCNC: 9.6 MG/DL (ref 8.7–10.5)
CHLORIDE SERPL-SCNC: 106 MMOL/L (ref 95–110)
CHOLEST SERPL-MCNC: 190 MG/DL (ref 120–199)
CHOLEST/HDLC SERPL: 4 {RATIO} (ref 2–5)
CO2 SERPL-SCNC: 22 MMOL/L (ref 23–29)
CREAT SERPL-MCNC: 0.8 MG/DL (ref 0.5–1.4)
EST. GFR  (NO RACE VARIABLE): >60 ML/MIN/1.73 M^2
ESTIMATED AVG GLUCOSE: 146 MG/DL (ref 68–131)
GLUCOSE SERPL-MCNC: 132 MG/DL (ref 70–110)
HBA1C MFR BLD: 6.7 % (ref 4–5.6)
HDLC SERPL-MCNC: 48 MG/DL (ref 40–75)
HDLC SERPL: 25.3 % (ref 20–50)
LDLC SERPL CALC-MCNC: 120.8 MG/DL (ref 63–159)
NONHDLC SERPL-MCNC: 142 MG/DL
POTASSIUM SERPL-SCNC: 4.4 MMOL/L (ref 3.5–5.1)
PROT SERPL-MCNC: 7.2 G/DL (ref 6–8.4)
SODIUM SERPL-SCNC: 137 MMOL/L (ref 136–145)
TRIGL SERPL-MCNC: 106 MG/DL (ref 30–150)

## 2024-01-20 PROCEDURE — 80053 COMPREHEN METABOLIC PANEL: CPT | Performed by: NURSE PRACTITIONER

## 2024-01-20 PROCEDURE — 36415 COLL VENOUS BLD VENIPUNCTURE: CPT | Mod: PO | Performed by: NURSE PRACTITIONER

## 2024-01-20 PROCEDURE — 83036 HEMOGLOBIN GLYCOSYLATED A1C: CPT | Performed by: NURSE PRACTITIONER

## 2024-01-20 PROCEDURE — 80061 LIPID PANEL: CPT | Performed by: NURSE PRACTITIONER

## 2024-01-24 ENCOUNTER — TELEPHONE (OUTPATIENT)
Dept: INTERNAL MEDICINE | Facility: CLINIC | Age: 59
End: 2024-01-24
Payer: COMMERCIAL

## 2024-01-24 NOTE — TELEPHONE ENCOUNTER
Called pt.  Regarding clinic updates, no/low water supply.     Advised pt of update. Offered for appt to be changed.    Pt requested reschedule.  Appt set for 2/6 @770a.

## 2024-02-06 ENCOUNTER — OFFICE VISIT (OUTPATIENT)
Dept: INTERNAL MEDICINE | Facility: CLINIC | Age: 59
End: 2024-02-06
Payer: COMMERCIAL

## 2024-02-06 VITALS
RESPIRATION RATE: 16 BRPM | DIASTOLIC BLOOD PRESSURE: 88 MMHG | BODY MASS INDEX: 30.08 KG/M2 | WEIGHT: 169.75 LBS | SYSTOLIC BLOOD PRESSURE: 132 MMHG | HEART RATE: 79 BPM | OXYGEN SATURATION: 95 % | TEMPERATURE: 98 F | HEIGHT: 63 IN

## 2024-02-06 DIAGNOSIS — I15.2 HYPERTENSION ASSOCIATED WITH DIABETES: ICD-10-CM

## 2024-02-06 DIAGNOSIS — E11.65 TYPE 2 DIABETES MELLITUS WITH HYPERGLYCEMIA, WITH LONG-TERM CURRENT USE OF INSULIN: Primary | ICD-10-CM

## 2024-02-06 DIAGNOSIS — Z79.4 TYPE 2 DIABETES MELLITUS WITH HYPERGLYCEMIA, WITH LONG-TERM CURRENT USE OF INSULIN: Primary | ICD-10-CM

## 2024-02-06 DIAGNOSIS — E11.59 HYPERTENSION ASSOCIATED WITH DIABETES: ICD-10-CM

## 2024-02-06 DIAGNOSIS — E66.09 CLASS 1 OBESITY DUE TO EXCESS CALORIES WITH SERIOUS COMORBIDITY AND BODY MASS INDEX (BMI) OF 33.0 TO 33.9 IN ADULT: ICD-10-CM

## 2024-02-06 DIAGNOSIS — E11.69 HYPERLIPIDEMIA ASSOCIATED WITH TYPE 2 DIABETES MELLITUS: ICD-10-CM

## 2024-02-06 DIAGNOSIS — E78.5 HYPERLIPIDEMIA ASSOCIATED WITH TYPE 2 DIABETES MELLITUS: ICD-10-CM

## 2024-02-06 PROCEDURE — 3075F SYST BP GE 130 - 139MM HG: CPT | Mod: CPTII,S$GLB,, | Performed by: NURSE PRACTITIONER

## 2024-02-06 PROCEDURE — 3066F NEPHROPATHY DOC TX: CPT | Mod: CPTII,S$GLB,, | Performed by: NURSE PRACTITIONER

## 2024-02-06 PROCEDURE — 1160F RVW MEDS BY RX/DR IN RCRD: CPT | Mod: CPTII,S$GLB,, | Performed by: NURSE PRACTITIONER

## 2024-02-06 PROCEDURE — 99214 OFFICE O/P EST MOD 30 MIN: CPT | Mod: S$GLB,,, | Performed by: NURSE PRACTITIONER

## 2024-02-06 PROCEDURE — 99999 PR PBB SHADOW E&M-EST. PATIENT-LVL IV: CPT | Mod: PBBFAC,,, | Performed by: NURSE PRACTITIONER

## 2024-02-06 PROCEDURE — 3079F DIAST BP 80-89 MM HG: CPT | Mod: CPTII,S$GLB,, | Performed by: NURSE PRACTITIONER

## 2024-02-06 PROCEDURE — 1159F MED LIST DOCD IN RCRD: CPT | Mod: CPTII,S$GLB,, | Performed by: NURSE PRACTITIONER

## 2024-02-06 PROCEDURE — 3061F NEG MICROALBUMINURIA REV: CPT | Mod: CPTII,S$GLB,, | Performed by: NURSE PRACTITIONER

## 2024-02-06 PROCEDURE — 3044F HG A1C LEVEL LT 7.0%: CPT | Mod: CPTII,S$GLB,, | Performed by: NURSE PRACTITIONER

## 2024-02-06 PROCEDURE — 3008F BODY MASS INDEX DOCD: CPT | Mod: CPTII,S$GLB,, | Performed by: NURSE PRACTITIONER

## 2024-02-06 RX ORDER — BLOOD-GLUCOSE SENSOR
1 EACH MISCELLANEOUS
Qty: 3 EACH | Refills: 11 | Status: SHIPPED | OUTPATIENT
Start: 2024-02-06

## 2024-02-06 RX ORDER — TIRZEPATIDE 12.5 MG/.5ML
12.5 INJECTION, SOLUTION SUBCUTANEOUS
Qty: 4 PEN | Refills: 6 | Status: SHIPPED | OUTPATIENT
Start: 2024-02-06 | End: 2024-02-12

## 2024-02-06 NOTE — PROGRESS NOTES
59 y.o. female here for 4 month follow up visit for management of T2dm -   Last seen October 2023 -     A1c is improved from 7.5% - down to 6.7% -   Also on metformin 1000 mg twice per day.   She continues on mounjaro 10mg every week. Doing well.   No longer on omnipod insulin pump - needing much less insulin -   She is taking basal insulin Toujeo 10 units every morning.     Tolerating her medications well -   No nausea/abdominal pain.   + constipation - bm every 2 - 3 days.     On Dexcom G6 - personal CGM -   See scanned in .   Average glucose -- 138 -   Gmi is @ 6.6% -   91% time in range.   8% highs.   <1% lows.   0% very lows.   <1% very highs.       Last visit notes as follows from October 2023 -   58 y.o. female here for routine follow up for management of T2dm -   Last seen august 2023 -     A1c is at 7.5% currently -   Continues on metformin 1000mg twice per day -   Also on mounjaro once per week - 7.5mg injection every week =- tolerating well.   She has lost another 10+ lbs. Was 194lbs in June 2023 - down to 179 lbs.    She is very happy with his results. Moving more/exercising more.   Continues on her omnipod 5 with her dexcom g6 - humalog insulin.   Downloaded and reviewed today - dirko report -   Total daily dose is 6.8 units/day -   1 bolus on average per day -   66% is coming from basal rate.   34% coming from bolusing.   100% in automated mode.   Basal rate is 0.5 units/hour -   Carb ratio is 1: 10   ISF is at 1: 40 -   Bg target 110 -     Dexcom g6 - downloaded and reviewed today -   See scanned in  -   Over 72 hours of data reviewed.   Average glucose -   78% time in range.   0% lows.   21% highs.   1% extreme high.       Last visit notes as follows from August 2023 -   58 y.o. female here for 6 week follow up visit for management of T2dm -   Last seen July 2023 - those notes below.     A1c is last at 8.4% in July 2023 - no new labs for today's visit -   Continues on  metformin 1000 mg twice per day.   Prior was on tresiba 30 units daily - (prior to this was on omnipod classic) -   I ordered omnipod dash at last visit, however she ended up with omnipod 5 instead.   Trained and switched to this - now only needing around 11 units of insulin per day.   Was prior on trulicity 3mg every week - switched her to mounjaro last month and she likes it -   Taking 5mg every week - less hungry and has lost around 10 lbs + more in 1 month's time.   Her sugar craving has dissapeared almost completely -   Sugar free puddings, more salads and veggies.     She is newly on omnipod 5 - pods from pharmacy   99% of the day in automated mode -   Total daily dose is 11.8 units/day -   72% is basal rate.   28% is coming from bolusing.   Basal rate is at 0.7 units/hour -   Isf at 40 -   Carb ratio 1: 10 -   Bg target 110 -   Active insulin time is at 4 hours -   Bg correction 150 -     Newly on dexcom g6 - downloaded and reviewed today -   See scanned in  - she likes this much more than sami 14 day.   Average glucose 171 -   66% time in range.   0% lows.   29%   5% very highs.       Last visit notes from July 6th, 2023 -   58 y.o. female here for routine follow up for management of T2dm -   Last seen in 2022 - those notes below.     A1c up again trending higher at 8.4%  (Was 7.5% last year and then 6.4% last year on insulin pump therapy).  Continues on metformin 1000 mg twice per day -   Continues on insulin - long acting - Tresiba 30 units every morning.   (Prior was on omnipod classic and we tried vgo prior to that).  Was on MDI prior to that even...    She liked having omnipod and did well on it - we tried to get dash approved, but never was successful.   She is on trulicity still 3mg every week - tolerated well.   We tried farxiga last year - helped a lot, however terrible yeast infection - so stopped taking it.   Admits not eating well.   Wants to get back on track.     Continues on  sami 14 day - is scanning 1 - 2 times per day on average - having to pay cash for cgm sensors -   See scanned in  - pays for sensors at the pharmacy - cash pay.   (She's only scanning/checking 1 - 2 times per day at most).   Average glucose 212 -   26% glucose only time in range.   54% highs.   20% very highs.   0% low blood sugars.     Still working full time.   Not exercising.     Last visit notes from 2022 as follow   57 y.o. female, here for 6 month follow up for management of T2dm   Last seen in February 2022 - those notes are below.     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   She has new insurance - but not sure omnipod insulin pump is covered on her current plan -   She was on on omnipod classic prior visits.   vgo 20 prior to that.   The meal time insulin really seemed to help.     Stopped using omnipod when she ran out of pods - around April/may 2022 - she called me to notify me.   We tried to get omnipod dash covered without success.  I switched her to long acting insulin once per day at that point as she really wasn't needing much insulin (around 23 units/day).   Right now she is taking Tresiba 30 units once every morning.   Also on trulicity 3 mg SC every week - takes on thursdays -   Continues metformin 1000 mg twice per day.   We had not tried an sglt2i yet - she had been concerned about cost.   She has good renal function. And no CI's to this drug class.     Admits lately not following ADA diet.   Has gained some weight. But she is motivated to lose weight again.   She'd like to get off of insulin if possible.     Free style sami - personal.   Downloaded and reviewed today -   See scanned in .   Average glucose 161   70% in target range.   28% highs.   2% extreme  highs.   No lows.       Last visit notes as follows from February 2022   57 y.o. female, here for routine 3 month follow up visit - for management of T2dm -   Last seen in November 2021 - those notes are below.     a1c is decreased from 6.9% to 6.4%.   Reports she has gained weight from holiday times - wasn't eating that great.   However is trying to restart better habits.   History of stroke (right MCA) -  impaired mobility at times - however she is back to work now.     Continues on metformin 1000 bid.    trulicity 3mg every week.   Had tried to add her on sglt2i in past, but it was too expensive and she really isn't privy to adding more medications at this time.   Switched her from vgo to omnipod - she is on older version (not dash) - gets through dme.   Was on novolog and now needs humalog rx today - her insurance will only cover.     omnipod downloaded - on glooko today -   Total daily dose is 23.2 units/day. She is needing much less insulin.   69% of insulin is coming from basal rate.   31% of insulin is coming from boluses.   Using pre-set boluses -   Basal rate is at 0.7 units/hour.   Active insulin time is set at 4 hours.  ISF set at 0 - does preset boluses - 4 or 6 units.   2 units for a snack.     On free style sami - downloaded and reviewed today -   Average glucose is 145   a1c estimated @ 6.8%   81% time in range.   0% lows.   17% highs.   2% extreme highs.     History of hyperthyroidism borderline in the past - her tft's have been stable since -   Her tsh today is slightly elevated at 4.588 - reports she is feeling sometimes fatigued.   Sometimes having night sweats, and has also gained weight over the past few months.   She is on zoloft - had dose increased and this has helped.       Last visit notes as follows from 11/2021:   56 y.o. female, here for follow up visit for management of T2dm -   Last seen 7/8/21 - those notes are below.     a1c more controlled @ 6.9%.   Trulicity 3 mg every week. No  "side effects (we had just increased her from the 1.5mg to 3mg every week).   Also on metformin 1000 mg twice per day.   On vgo 20 - 3 clicks with meal for most part - sometimes gives extra click for a "correction" if the bg is > 180 -   1 click with snack on occasion.   Using sami  - she met with diabetic educator -   She just downloaded it on her phone today - forgot her reader.   So not much data.   Average bg is 147 -   100% tir - see report scanned in .     Reports that she does have some lower sugar readings over night - sometimes 70 - 80's   But most of the time 100 - 130's during the day.   Not exercising a lot - has some back issues/so prohibited a bit from this.   Continuing on gabapentin which helps with her neuropathy in legs.   Also taking diclofenac 2 times per day which helps.   She is trying to get more active and is walking her dogs (she has 3 of them ) 3 times per day for walks.       Last visit notes as follows from 7/8/2021:   56 y.o. female, here for 3 month follow up visit for management of T2dm   Last seen April 2021 - those notes below.     a1c now improved @ 7.1%.   She is on metformin 1000 bid.   trulicity 1.5mg every week   And vgo 20 -     Continues on freestyle sami personal cgm -   Downloaded and reviewed today - see scanned in . -   Average bg is 143 -   a1c estimated at 6.7%.   85% TIR  0% lows.   1% very lows - 1 occasion.   13% highs.   1% extreme highs.         Last visit notes as follows from April 2021:  56 y.o. female here for follow up visit for T2dm, last seen 2/25/2021 - thos notes are blow.     a1c was last @ 13.7% and is now estimated at 6.2% on her cgm.   Labs reveal a1c currently is at 7.9%.   She has great trends. Feeling better. She is extremely happy with her results and quick turn around improvement.   Eating healthier, history of a stroke, but very motivated and refuses to 'be sick again".     Patient continues on metformin 1000 mg twice per day.   Also " on trulicity 1.5mg every week.   In the interim, I switched her from MDI to vgo 20 - doing 3 clicks with meals tid.   Occasional clicks with snacks.   Met with tuyet sanchez and trained on vgo 3/5/2021 -     Had tried to start her on farxiga or jardiance, but too expensive on her plan,   And prefers to stay on same regimen.     Free style sami - interpreted today and see scanned in to .   Average glucose is 119.   a1c estimated at 6.2%.   91% time in range.   3% lows (happening after meal time boluses and over night).   6%  Highs.       Last OV notes as follows:   56 y.o. female, here for 6 week follow up for T2dm management.   Last seen 1/26/2021 - those notes below.     a1c had been at 13.7%, but she had stopped her mealt kasia insulin and her glp1 before seeing me.   Continues on metformin 1000mg twice per day.   She is now on trulicity 1.5mg every week on fridays. Tolerating well - she's had some weight loss - 5 lbs since last visit.   Feels it does suppress appetite somewhat.   Continues on Levemir 30 units every night now. (was on lower amount 25 units).   She is back on novolog prior to meals - tid - 6 units. Reports it is difficult however to remember meal time boluses.   She is on injections 4 times per day. titrates her insulin based on self testing results.   She if finger sticking. dexcom was approved. But cost is high on her current insurance plan as she hasn't met her deductible yet.   States sugars are improved - no logs with her today -   States sugars fasting are running 170 - 200's now, so improved from 300's from past visit.   No acute complaints today's visit.       Last visit notes from 1/26/2021 as follows:   HPI: Rayne Aaron is a 59 y.o.  female c/I for visit to address Diabetes Type 2  This is the first time I am seeing her for care, follows with Deb Tobin MD for primary care needs.   She has seen Donna George NP in the past for management for her diabetes. At [facility]  [place].   She has moved closer to [facility], so wanted to switch providers.     was diagnosed with T2DM in 2009.    Her father, paternal grandparents and brothers and sisters all have T2dm. (9 siblings total)  Denies missing doses of DM medication.   Has been on and off medications through the years -  was on insulin, then controlled and taken off.   Also reports stopping/ran out last year in 2019.     Now has restarted her insulin over the past month following a stroke.   Most recently was diagnosed with stroke in December 2020.   No deficits. She has recovered and is back working at home.   She is motivated to get her diabetes under control and prevent further progression of her diabetes.   She does not want another stroke.     Denies any other complications from diabetes   Denies nephropathy.   Denies Diabetic retinopathy.   + HTN, + HLD.   + stroke - now on aspirin, plavix and statin.     Last saw Donna George NP in 4/2019 -   Her a1c was > 14% at that time. And she had been off of her insulin at that time for about 3 months.   Current a1c is still high at 13.7%.   Had been ordered a vgo patch, but patient states she is not sure that it was ever approved.   She also had wanted to be on a cgm to avoid the frequent fingersticks, but states her insurance company never approved.      Current regimen:  Levemir 26 U HS  (previously on novolog insulin 8 units with meals - but ran out of rx. Hospital did not restart).   Metformin 1000 mg BID  Was on trulicity weekly and ozempic at one point weekly - but ran out, then insurance stopped approving.   Denies any known side effects to GLP1's.     She is checking her glucoses 4 times per day.   Reports history of hypoglycemic episodes and unawareness.   No bg logs - but reports sugars range from 250 - 350's overall.       Past medical History:   Past Medical History:   Diagnosis Date    Diabetes mellitus, type 2     Hyperlipidemia     Hypertension     Multinodular  "goiter 10/21/2015    Stroke     Subclinical hyperthyroidism 10/21/2015      Family hx:   Family History   Problem Relation Age of Onset    Dementia Mother     Heart disease Mother     Hypertension Mother     Heart disease Father     Diabetes Father     Diabetes Maternal Grandmother     Hypertension Maternal Grandmother     Ovarian cancer Maternal Grandmother     Depression Maternal Grandfather     Diabetes Paternal Grandmother     Peripheral vascular disease Paternal Grandmother     Hyperthyroidism Sister     Breast cancer Neg Hx     Colon cancer Neg Hx     Thyroid cancer Neg Hx       Current meds:   Current Outpatient Medications:     amLODIPine (NORVASC) 2.5 MG tablet, Take 1 tablet (2.5 mg total) by mouth once daily., Disp: 90 tablet, Rfl: 3    aspirin (ECOTRIN) 81 MG EC tablet, Take 1 tablet (81 mg total) by mouth once daily., Disp: 180 tablet, Rfl: 2    atorvastatin (LIPITOR) 80 MG tablet, Take 1 tablet (80 mg total) by mouth every evening., Disp: 90 tablet, Rfl: 3    blood-glucose sensor (DEXCOM G7 SENSOR) Misty, 1 each by Misc.(Non-Drug; Combo Route) route every 10 days., Disp: 3 each, Rfl: 11    gabapentin (NEURONTIN) 300 MG capsule, Take 1 capsule (300 mg total) by mouth 3 (three) times daily., Disp: 270 capsule, Rfl: 3    insulin glargine, TOUJEO, (TOUJEO SOLOSTAR U-300 INSULIN) 300 unit/mL (1.5 mL) InPn pen, Inject 10 Units into the skin once daily., Disp: 18 mL, Rfl: 3    metFORMIN (GLUCOPHAGE) 1000 MG tablet, Take 1 tablet (1,000 mg total) by mouth 2 (two) times daily with meals., Disp: 180 tablet, Rfl: 3    pen needle, diabetic 32 gauge x 5/32" Ndle, Inject 1 each into the skin every morning., Disp: 100 each, Rfl: 3    sertraline (ZOLOFT) 100 MG tablet, Take 1 tablet (100 mg total) by mouth once daily., Disp: 90 tablet, Rfl: 3    tirzepatide (MOUNJARO) 12.5 mg/0.5 mL PnIj, Inject 12.5 mg into the skin every 7 days., Disp: 4 pen , Rfl: 6    valsartan (DIOVAN) 320 MG tablet, Take 1 tablet (320 mg total) " by mouth once daily., Disp: 90 tablet, Rfl: 3     Current Diabetes medications:    Metformin 1000 mg po bid with food  Mounjaro 10 mg every week.   Toujeo 10 units daily.      Medications Tried and Failed:   Had been on trulicity in past   ozempic in past.    Long acting insulin:  Levemir 30 units every HS.   Short acting insulin: novolog 6 units tid ac meals.  Was on VGO in past. Now switched to omnipod (insurance coverage).   novolog insulin via Omnipod pump (formerly on vgo).   Farxiga 5mg po daily - bad yeast infections -   Omnipod 5 - with dexcom g6 - in automated mode -   trulicity 3 mg sc weekly.   Tresiba 30 units every morning      Review of Pertinent co-morbidities/risk factors:   CV: Denies history of MI. + stroke.   CAD: Denies.  Takes aspirin 81mg tablet daily and plavix also.   BP: has history of HTN  Statin: Taking  ACE/ARB: Taking    Social History     Tobacco Use   Smoking Status Never   Smokeless Tobacco Never     Social:   Lives at home with her son.   Life changes/stressors currently:  passed away in October 2020 - so has been stressed since then.   Just started new job in December.   Diet: not always following ADA diet   Meals: 3 per day and snacks.        Breakfast - 1/2 of a muffin or banana       Lunch - subway, sandwich at home. Veggies.        Dinner - fish, smoked brisquet, steak. Potatoes.        Snacks - sugar free chocolate pudding. Yogurt, mixed fruit        Drinks - diet pepsi, diet coke, water. Flavored arredondo  Exercise: walking dogs.   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   Was working from Home. .     Glucose Monitoring:   New personal Dexcom G6 - with omnipod 5   Personal free style sami CGM  Previoulsy, was Checking sugars 4x/day by fingerstick.   Gets supplies from pharmacy.   Was getting Omnipods (older version - classic ) - mailed to her - through dme - she is not sure which company.   On dexcom  "g6 personal.     Standards of care:   Eyes: .: 09/22/2022  Foot exam: : 03/08/2023   Diabetes education:  Yes - February 2021.    Vital Signs  /88 (BP Location: Left arm, Patient Position: Sitting, BP Method: Medium (Manual))   Pulse 79   Temp 97.5 °F (36.4 °C) (Temporal)   Resp 16   Ht 5' 3" (1.6 m)   Wt 77 kg (169 lb 12.1 oz)   LMP 09/08/2015 (Approximate)   SpO2 95%   BMI 30.07 kg/m²     Pertinent Labs:   Hgba1c   Lab Results   Component Value Date    HGBA1C 6.7 (H) 01/20/2024    HGBA1C 7.5 (H) 10/14/2023    HGBA1C 8.4 (H) 07/01/2023     Lipid panel   Lab Results   Component Value Date    CHOL 190 01/20/2024    CHOL 188 10/14/2023    CHOL 167 07/01/2023     Lab Results   Component Value Date    HDL 48 01/20/2024    HDL 48 10/14/2023    HDL 46 07/01/2023     Lab Results   Component Value Date    LDLCALC 120.8 01/20/2024    LDLCALC 115.6 10/14/2023    LDLCALC 97.4 07/01/2023     Lab Results   Component Value Date    TRIG 106 01/20/2024    TRIG 122 10/14/2023    TRIG 118 07/01/2023     Lab Results   Component Value Date    CHOLHDL 25.3 01/20/2024    CHOLHDL 25.5 10/14/2023    CHOLHDL 27.5 07/01/2023      CMP  Glucose   Date Value Ref Range Status   01/20/2024 132 (H) 70 - 110 mg/dL Final     BUN   Date Value Ref Range Status   01/20/2024 18 6 - 20 mg/dL Final     Creatinine   Date Value Ref Range Status   01/20/2024 0.8 0.5 - 1.4 mg/dL Final     eGFR if    Date Value Ref Range Status   02/04/2022 >60.0 >60 mL/min/1.73 m^2 Final     eGFR if non    Date Value Ref Range Status   02/04/2022 >60.0 >60 mL/min/1.73 m^2 Final     Comment:     Calculation used to obtain the estimated glomerular filtration  rate (eGFR) is the CKD-EPI equation.        AST   Date Value Ref Range Status   01/20/2024 15 10 - 40 U/L Final     ALT   Date Value Ref Range Status   01/20/2024 18 10 - 44 U/L Final     Microalbumin creatinine ratio:   Lab Results   Component Value Date    MICALBCREAT 4.7 " 01/20/2024       Review Of Systems:   Gen: Appetite good, 30 lbs weight loss+, no fatigue or weakness, Denies polydipsia.  Skin: Skin is intact and heals well, denies any rashes or hair changes.   EENT: Denies any acute visual disturbances, nor blurred vision.    Resp: Denies SOB or Dyspnea on exertion, denies cough.   Cardiac: Denies chest pain, palpitations, or swelling.   GI: Denies abdominal pain, nausea or vomiting, diarrhea, or constipation.   /GYN: Denies nocturia, nor burning, frequency or pain on urination.  MS/Neuro: + numbness/ tingling in BLE; some chronic pain in lower back and legs, history of sciatica - nothing recent.   Gait steady, speech clear  Psych: Denies drug/ETOH abuse, + hx of depression - is on zoloft.  Other systems: negative.    Physical Exam:   GENERAL: Well developed, well nourished in appearance.   PSYCH: AAOx3, appropriate mood and affect, pleasant expression, conversant, appears relaxed, well groomed.   EYES: PERRL, Conjunctiva and corneas clear  NECK: Soft and Supple, trachea midline  CHEST: Even, regular, and unlabored respirations,  ABDOMEN: Soft, non-tender, non-distended.    VASCULAR: pedal pulses palpable bilaterally, no edema.  NEURO:  cranial nerves II - XII intact   MUSCULOSKELETAL: Good ROM, steady gait.   SKIN: Skin warm, dry, and intact     Assessment and Plan of Care:     Rayne was seen today for follow-up and diabetes.    Diagnoses and all orders for this visit:    Type 2 diabetes mellitus with hyperglycemia, with long-term current use of insulin  -     tirzepatide (MOUNJARO) 12.5 mg/0.5 mL PnIj; Inject 12.5 mg into the skin every 7 days.  -     blood-glucose sensor (DEXCOM G7 SENSOR) Misty; 1 each by Misc.(Non-Drug; Combo Route) route every 10 days.    Hyperlipidemia associated with type 2 diabetes mellitus    Hypertension associated with diabetes    Class 1 obesity due to excess calories with serious comorbidity and body mass index (BMI) of 33.0 to 33.9 in  adult             1. T2DM with hyperglycemia- Hgba1c goal is 7.5% or less without hypoglycemia - 8.4%--- > 14%--> 13.7%.---> 7.9% --> 7.1% --> 6.9%--> 6.4%--  8.4% --> 7.5% --> 6.7%   discussed DM, progression of disease, long term complications, CV risk factors and tx options.     Continue metformin 1000 bid.   Can continue toujeo 10 untis daily - but begin to wean - decrease to 5 units daily then off if glucose remain 80 - 120's.   Continue on mounjaro - increase from 10mg to 12.5mg every week.   Farxiga gave her yeast infection - so will not try again yet...   No known history of pancreatitis or medullary thyroid cancer.   If you experience severe abdominal pain, nausea, or diarrhea - please call me or notify my office immediately.   Prefers to keep same regimen at present.   Advise compliance with ADA diet and encourage exercise- gave list.   Reviewed  hypoglycemia, s/s and appropriate tx. Have/get quick acting glucose tablets at hand.   Instructed to monitor Blood glucose 2 - 4x/day and bring meter/ log to every clinic visit.   Continue cgm therapy -dexcom g6 - changed to g7 -   She's no longer on or needing omnipod 5 -   virtually and more closely monitor glucose readings  This enables me to make any necessary adjustments to the patients diabetes regimen while keeping the patient and staff safe during the COVID-19 PHE.    2. HTN controlled for most part - continue meds as previously prescribed and monitor.   diovan 320mg tablet daily. norvasc 2.5 mg daily - refilled at prior visit.   Urine mac negative.     3. HLD- LDL goal < 100. at goal.   Currently on statin therapy- high dose - 80mg every HS. Should continue.   Will consider repatha in future if needed.    Refilled rx last visit.     4. Weight - BMI Body mass index is 30.07 kg/m².   Encourage Ada diet and exercise.   Continue glp + gip1 - continue mounjaro - increase from 7.5mg to 10mg every week.     5. Renal Function - stable. Will monitor trends.      6. MNG - history of MNG - no compressive symptoms.   Last thyroid Ultrasound was in 2015 and FNA negative.   Would recommend repeat for surveillance. Will discuss at follow up visit.  Repeat tft's on next lab draw in 2 weeks.   tsh was slightly high - 4.5 - if still elevated can treat with synthroid 50 daily - adult onset hypothyroidism.     7. S/p Right MCA stroke - on plavix and aspirin and high dose statin now.   Discussed CV risk factors.   (A1c wa at 14% at that piont).      8. Lumbar stenosis - has been recommended spine surgery. Doesn't want surgery for now - concerned as her family members had bad experiences.   I recommended possible spinal injections - placed consult for pain management. She is going. Now in physical therapy.   Still having sciatica down her right leg despite nsaids and therapy.   Feeling better today's visit -         Labs and OV in 3 months time.

## 2024-02-07 ENCOUNTER — TELEPHONE (OUTPATIENT)
Dept: INTERNAL MEDICINE | Facility: CLINIC | Age: 59
End: 2024-02-07
Payer: COMMERCIAL

## 2024-02-07 NOTE — TELEPHONE ENCOUNTER
----- Message from April Buckley sent at 2/7/2024 11:01 AM CST -----  Contact: [phone]  Patient is requesting a call from the staff regarding: scheduling 3 month f/u on may but she need aTuesday. Preferably early morning

## 2024-02-12 ENCOUNTER — TELEPHONE (OUTPATIENT)
Dept: INTERNAL MEDICINE | Facility: CLINIC | Age: 59
End: 2024-02-12
Payer: COMMERCIAL

## 2024-02-12 NOTE — TELEPHONE ENCOUNTER
Hey! No problem, thanks for letting me know. Just sent over the 10mg mounjaro for Mrs. Aaron, let me know if you get 12.5 in stock by next month and we can increase/change her dose at that point.     Thanks,  Patricia

## 2024-02-12 NOTE — TELEPHONE ENCOUNTER
----- Message from Lorena Alvares MA sent at 2/9/2024 11:24 AM CST -----  Regarding: FW: Mounjaro 12.5mg on backorder    ----- Message -----  From: Wilma Xavier PharmD  Sent: 2/9/2024  11:21 AM CST  To: [facility] Staff  Subject: Mounjaro 12.5mg on backorder                     Hey!  The Mounjaro 12.5mg is on backorder. Ms Estrada needs it - can you send in an RX for either 10mg or 15mg? Thanks!  This is [facility]!

## 2024-03-08 ENCOUNTER — TELEPHONE (OUTPATIENT)
Dept: INTERNAL MEDICINE | Facility: CLINIC | Age: 59
End: 2024-03-08
Payer: COMMERCIAL

## 2024-03-08 DIAGNOSIS — Z79.4 TYPE 2 DIABETES MELLITUS WITH HYPERGLYCEMIA, WITH LONG-TERM CURRENT USE OF INSULIN: Primary | ICD-10-CM

## 2024-03-08 DIAGNOSIS — E11.65 TYPE 2 DIABETES MELLITUS WITH HYPERGLYCEMIA, WITH LONG-TERM CURRENT USE OF INSULIN: Primary | ICD-10-CM

## 2024-03-08 NOTE — TELEPHONE ENCOUNTER
----- Message from Wilma Xavier, [place] sent at 3/8/2024  8:26 AM CST -----  Regarding: Mounjaro 10mg out of stock  Hello, This is Wilma at [facility].  Unfortunately we are out of stock in the Mounjaro 10mg and 12.5mg and she is not willing to drive to another location. I informed her that we only have the 7.5mg in stock and she asked if she could get 1 month supply of 7.5mg in hopes 10mg comes back in stock soon. Thanks!

## 2024-03-08 NOTE — TELEPHONE ENCOUNTER
LOV 2-6-24    Pt's pharmacy does not have Mounjaro 10mg and 12.5mg in stock.  Pt is not willing to drive to another pharmacy.    Pt is requesting Rx for 1 month supply of 7.5mg in hopes 10mg comes back in stock soon.     Please advise  Thanks

## 2024-03-09 NOTE — TELEPHONE ENCOUNTER
No problem, you can let her know I sent in the 7.5mg dose for her -   If the 10mg becomes available, to let me know, and I can send back in for her next Rx, no problem.     We are sorry for the inconvenience.   Thanks,  Patricia

## 2024-03-12 DIAGNOSIS — F32.A DEPRESSION, UNSPECIFIED DEPRESSION TYPE: ICD-10-CM

## 2024-03-12 DIAGNOSIS — E11.65 TYPE 2 DIABETES MELLITUS WITH HYPERGLYCEMIA, WITH LONG-TERM CURRENT USE OF INSULIN: ICD-10-CM

## 2024-03-12 DIAGNOSIS — Z79.4 TYPE 2 DIABETES MELLITUS WITH HYPERGLYCEMIA, WITH LONG-TERM CURRENT USE OF INSULIN: ICD-10-CM

## 2024-03-12 DIAGNOSIS — I15.2 HYPERTENSION ASSOCIATED WITH DIABETES: ICD-10-CM

## 2024-03-12 DIAGNOSIS — E11.59 HYPERTENSION ASSOCIATED WITH DIABETES: ICD-10-CM

## 2024-03-13 RX ORDER — SERTRALINE HYDROCHLORIDE 100 MG/1
100 TABLET, FILM COATED ORAL
Qty: 90 TABLET | Refills: 3 | Status: SHIPPED | OUTPATIENT
Start: 2024-03-13 | End: 2024-04-30 | Stop reason: SDUPTHER

## 2024-03-13 RX ORDER — ATORVASTATIN CALCIUM 80 MG/1
80 TABLET, FILM COATED ORAL NIGHTLY
Qty: 90 TABLET | Refills: 3 | Status: SHIPPED | OUTPATIENT
Start: 2024-03-13 | End: 2024-04-30 | Stop reason: SDUPTHER

## 2024-03-13 RX ORDER — METFORMIN HYDROCHLORIDE 1000 MG/1
1000 TABLET ORAL 2 TIMES DAILY WITH MEALS
Qty: 180 TABLET | Refills: 3 | Status: SHIPPED | OUTPATIENT
Start: 2024-03-13 | End: 2024-04-30 | Stop reason: SDUPTHER

## 2024-03-13 RX ORDER — VALSARTAN 320 MG/1
320 TABLET ORAL
Qty: 90 TABLET | Refills: 3 | Status: SHIPPED | OUTPATIENT
Start: 2024-03-13 | End: 2024-04-30 | Stop reason: SDUPTHER

## 2024-04-19 ENCOUNTER — PATIENT MESSAGE (OUTPATIENT)
Dept: ADMINISTRATIVE | Facility: HOSPITAL | Age: 59
End: 2024-04-19
Payer: COMMERCIAL

## 2024-04-22 ENCOUNTER — TELEPHONE (OUTPATIENT)
Dept: INTERNAL MEDICINE | Facility: CLINIC | Age: 59
End: 2024-04-22
Payer: COMMERCIAL

## 2024-04-22 DIAGNOSIS — E66.09 CLASS 1 OBESITY DUE TO EXCESS CALORIES WITH SERIOUS COMORBIDITY AND BODY MASS INDEX (BMI) OF 33.0 TO 33.9 IN ADULT: ICD-10-CM

## 2024-04-22 DIAGNOSIS — E11.69 HYPERLIPIDEMIA ASSOCIATED WITH TYPE 2 DIABETES MELLITUS: ICD-10-CM

## 2024-04-22 DIAGNOSIS — Z00.00 ENCOUNTER FOR PREVENTIVE HEALTH EXAMINATION: Primary | ICD-10-CM

## 2024-04-22 DIAGNOSIS — Z79.4 TYPE 2 DIABETES MELLITUS WITH HYPERGLYCEMIA, WITH LONG-TERM CURRENT USE OF INSULIN: ICD-10-CM

## 2024-04-22 DIAGNOSIS — I63.411 EMBOLIC STROKE INVOLVING RIGHT MIDDLE CEREBRAL ARTERY: ICD-10-CM

## 2024-04-22 DIAGNOSIS — F32.A DEPRESSION, UNSPECIFIED DEPRESSION TYPE: ICD-10-CM

## 2024-04-22 DIAGNOSIS — I15.2 HYPERTENSION ASSOCIATED WITH DIABETES: ICD-10-CM

## 2024-04-22 DIAGNOSIS — E78.5 HYPERLIPIDEMIA ASSOCIATED WITH TYPE 2 DIABETES MELLITUS: ICD-10-CM

## 2024-04-22 DIAGNOSIS — E11.59 HYPERTENSION ASSOCIATED WITH DIABETES: ICD-10-CM

## 2024-04-22 DIAGNOSIS — E11.65 TYPE 2 DIABETES MELLITUS WITH HYPERGLYCEMIA, WITH LONG-TERM CURRENT USE OF INSULIN: ICD-10-CM

## 2024-04-22 NOTE — TELEPHONE ENCOUNTER
Advised pt that I will send a message to her PCP to provide an Endo referral so pt can be seen for continuance of DM care.

## 2024-04-22 NOTE — TELEPHONE ENCOUNTER
----- Message from Tayla Bazzi sent at 4/22/2024  8:51 AM CDT -----  Contact: [phone]@patient  Good morning patient would like a call back to reschedule her upcoming apt on 5/7 patient says she received a message to reschedule. Please call patient to advise  [phone]

## 2024-04-24 ENCOUNTER — TELEPHONE (OUTPATIENT)
Dept: INTERNAL MEDICINE | Facility: CLINIC | Age: 59
End: 2024-04-24
Payer: COMMERCIAL

## 2024-04-24 NOTE — TELEPHONE ENCOUNTER
----- Message from April Buckley sent at 4/24/2024  4:50 PM CDT -----  Contact: [phone]  Type: Returning a call    Who left a message? Ms Rodriguez     When did the practice call? Hours ago     Comments: Pt stated  can take the appt for  04/30/2024 at 10:00 am. Pt wants to has lab done on Saturday.     Thank you

## 2024-04-27 ENCOUNTER — LAB VISIT (OUTPATIENT)
Dept: LAB | Facility: HOSPITAL | Age: 59
End: 2024-04-27
Attending: HOSPITALIST
Payer: COMMERCIAL

## 2024-04-27 DIAGNOSIS — I15.2 HYPERTENSION ASSOCIATED WITH DIABETES: ICD-10-CM

## 2024-04-27 DIAGNOSIS — F32.A DEPRESSION, UNSPECIFIED DEPRESSION TYPE: ICD-10-CM

## 2024-04-27 DIAGNOSIS — I63.411 EMBOLIC STROKE INVOLVING RIGHT MIDDLE CEREBRAL ARTERY: ICD-10-CM

## 2024-04-27 DIAGNOSIS — E11.65 TYPE 2 DIABETES MELLITUS WITH HYPERGLYCEMIA, WITH LONG-TERM CURRENT USE OF INSULIN: ICD-10-CM

## 2024-04-27 DIAGNOSIS — E66.09 CLASS 1 OBESITY DUE TO EXCESS CALORIES WITH SERIOUS COMORBIDITY AND BODY MASS INDEX (BMI) OF 33.0 TO 33.9 IN ADULT: ICD-10-CM

## 2024-04-27 DIAGNOSIS — E11.59 HYPERTENSION ASSOCIATED WITH DIABETES: ICD-10-CM

## 2024-04-27 DIAGNOSIS — Z79.4 TYPE 2 DIABETES MELLITUS WITH HYPERGLYCEMIA, WITH LONG-TERM CURRENT USE OF INSULIN: ICD-10-CM

## 2024-04-27 DIAGNOSIS — Z00.00 ENCOUNTER FOR PREVENTIVE HEALTH EXAMINATION: ICD-10-CM

## 2024-04-27 DIAGNOSIS — E11.69 HYPERLIPIDEMIA ASSOCIATED WITH TYPE 2 DIABETES MELLITUS: ICD-10-CM

## 2024-04-27 DIAGNOSIS — E78.5 HYPERLIPIDEMIA ASSOCIATED WITH TYPE 2 DIABETES MELLITUS: ICD-10-CM

## 2024-04-27 LAB
ALBUMIN/CREAT UR: 9.5 UG/MG (ref 0–30)
BACTERIA #/AREA URNS AUTO: ABNORMAL /HPF
BILIRUB UR QL STRIP: NEGATIVE
CLARITY UR REFRACT.AUTO: CLEAR
COLOR UR AUTO: YELLOW
CREAT UR-MCNC: 84 MG/DL (ref 15–325)
GLUCOSE UR QL STRIP: NEGATIVE
HGB UR QL STRIP: NEGATIVE
KETONES UR QL STRIP: NEGATIVE
LEUKOCYTE ESTERASE UR QL STRIP: ABNORMAL
MICROALBUMIN UR DL<=1MG/L-MCNC: 8 UG/ML
MICROSCOPIC COMMENT: ABNORMAL
NITRITE UR QL STRIP: NEGATIVE
PH UR STRIP: 5 [PH] (ref 5–8)
PROT UR QL STRIP: NEGATIVE
RBC #/AREA URNS AUTO: 1 /HPF (ref 0–4)
SP GR UR STRIP: 1.02 (ref 1–1.03)
SQUAMOUS #/AREA URNS AUTO: 1 /HPF
URN SPEC COLLECT METH UR: ABNORMAL
WBC #/AREA URNS AUTO: 15 /HPF (ref 0–5)

## 2024-04-27 PROCEDURE — 81001 URINALYSIS AUTO W/SCOPE: CPT | Performed by: HOSPITALIST

## 2024-04-27 PROCEDURE — 82043 UR ALBUMIN QUANTITATIVE: CPT | Performed by: HOSPITALIST

## 2024-04-30 ENCOUNTER — OFFICE VISIT (OUTPATIENT)
Dept: INTERNAL MEDICINE | Facility: CLINIC | Age: 59
End: 2024-04-30
Payer: COMMERCIAL

## 2024-04-30 VITALS
HEIGHT: 63 IN | HEART RATE: 87 BPM | BODY MASS INDEX: 29.57 KG/M2 | DIASTOLIC BLOOD PRESSURE: 100 MMHG | WEIGHT: 166.88 LBS | OXYGEN SATURATION: 99 % | TEMPERATURE: 97 F | RESPIRATION RATE: 15 BRPM | SYSTOLIC BLOOD PRESSURE: 138 MMHG

## 2024-04-30 DIAGNOSIS — Z00.00 ANNUAL PHYSICAL EXAM: Primary | ICD-10-CM

## 2024-04-30 DIAGNOSIS — E78.5 HYPERLIPIDEMIA ASSOCIATED WITH TYPE 2 DIABETES MELLITUS: ICD-10-CM

## 2024-04-30 DIAGNOSIS — F33.9 RECURRENT MAJOR DEPRESSIVE DISORDER, REMISSION STATUS UNSPECIFIED: ICD-10-CM

## 2024-04-30 DIAGNOSIS — Z12.12 SCREENING FOR COLORECTAL CANCER: ICD-10-CM

## 2024-04-30 DIAGNOSIS — E11.65 TYPE 2 DIABETES MELLITUS WITH HYPERGLYCEMIA, WITH LONG-TERM CURRENT USE OF INSULIN: ICD-10-CM

## 2024-04-30 DIAGNOSIS — Z12.11 SCREENING FOR COLORECTAL CANCER: ICD-10-CM

## 2024-04-30 DIAGNOSIS — E11.69 HYPERLIPIDEMIA ASSOCIATED WITH TYPE 2 DIABETES MELLITUS: ICD-10-CM

## 2024-04-30 DIAGNOSIS — Z79.4 TYPE 2 DIABETES MELLITUS WITH HYPERGLYCEMIA, WITH LONG-TERM CURRENT USE OF INSULIN: ICD-10-CM

## 2024-04-30 DIAGNOSIS — E66.3 OVERWEIGHT (BMI 25.0-29.9): ICD-10-CM

## 2024-04-30 DIAGNOSIS — E11.59 HYPERTENSION ASSOCIATED WITH DIABETES: ICD-10-CM

## 2024-04-30 DIAGNOSIS — I15.2 HYPERTENSION ASSOCIATED WITH DIABETES: ICD-10-CM

## 2024-04-30 PROCEDURE — 3075F SYST BP GE 130 - 139MM HG: CPT | Mod: CPTII,S$GLB,, | Performed by: HOSPITALIST

## 2024-04-30 PROCEDURE — 3044F HG A1C LEVEL LT 7.0%: CPT | Mod: CPTII,S$GLB,, | Performed by: HOSPITALIST

## 2024-04-30 PROCEDURE — 3080F DIAST BP >= 90 MM HG: CPT | Mod: CPTII,S$GLB,, | Performed by: HOSPITALIST

## 2024-04-30 PROCEDURE — 1159F MED LIST DOCD IN RCRD: CPT | Mod: CPTII,S$GLB,, | Performed by: HOSPITALIST

## 2024-04-30 PROCEDURE — 1160F RVW MEDS BY RX/DR IN RCRD: CPT | Mod: CPTII,S$GLB,, | Performed by: HOSPITALIST

## 2024-04-30 PROCEDURE — 99999 PR PBB SHADOW E&M-EST. PATIENT-LVL IV: CPT | Mod: PBBFAC,,, | Performed by: HOSPITALIST

## 2024-04-30 PROCEDURE — 3008F BODY MASS INDEX DOCD: CPT | Mod: CPTII,S$GLB,, | Performed by: HOSPITALIST

## 2024-04-30 PROCEDURE — 4010F ACE/ARB THERAPY RXD/TAKEN: CPT | Mod: CPTII,S$GLB,, | Performed by: HOSPITALIST

## 2024-04-30 PROCEDURE — 3066F NEPHROPATHY DOC TX: CPT | Mod: CPTII,S$GLB,, | Performed by: HOSPITALIST

## 2024-04-30 PROCEDURE — 99396 PREV VISIT EST AGE 40-64: CPT | Mod: S$GLB,,, | Performed by: HOSPITALIST

## 2024-04-30 PROCEDURE — 3061F NEG MICROALBUMINURIA REV: CPT | Mod: CPTII,S$GLB,, | Performed by: HOSPITALIST

## 2024-04-30 RX ORDER — INSULIN GLARGINE 300 [IU]/ML
10 INJECTION, SOLUTION SUBCUTANEOUS DAILY
Qty: 18 ML | Refills: 3 | Status: SHIPPED | OUTPATIENT
Start: 2024-04-30

## 2024-04-30 RX ORDER — METFORMIN HYDROCHLORIDE 1000 MG/1
1000 TABLET ORAL 2 TIMES DAILY WITH MEALS
Qty: 180 TABLET | Refills: 3 | Status: SHIPPED | OUTPATIENT
Start: 2024-04-30

## 2024-04-30 RX ORDER — VALSARTAN 320 MG/1
320 TABLET ORAL DAILY
Qty: 90 TABLET | Refills: 3 | Status: SHIPPED | OUTPATIENT
Start: 2024-04-30

## 2024-04-30 RX ORDER — ATORVASTATIN CALCIUM 80 MG/1
80 TABLET, FILM COATED ORAL NIGHTLY
Qty: 90 TABLET | Refills: 3 | Status: SHIPPED | OUTPATIENT
Start: 2024-04-30

## 2024-04-30 RX ORDER — AMLODIPINE BESYLATE 2.5 MG/1
2.5 TABLET ORAL DAILY
Qty: 90 TABLET | Refills: 3 | Status: SHIPPED | OUTPATIENT
Start: 2024-04-30 | End: 2025-04-30

## 2024-04-30 RX ORDER — SERTRALINE HYDROCHLORIDE 100 MG/1
100 TABLET, FILM COATED ORAL DAILY
Qty: 90 TABLET | Refills: 3 | Status: SHIPPED | OUTPATIENT
Start: 2024-04-30

## 2024-04-30 NOTE — PROGRESS NOTES
Subjective:     @Patient ID: Rayne Aaron is a 59 y.o. female.    Chief Complaint: Annual Exam    HPI    60 yo F with DM2, HTN, HLD, hx of CVA with residual deficit, depression presents for annual:      1. DM2: Mounjaro 7.5 (or 10 mg if in stock) mg weekly and metformin 1000 mg bid, basal insulin Toujeo decreased to 5 units qAM . Last a1c 6.8  2. HTN: amlodipine 2.5 mg and valsartan 320 mg daily. Reports bp is well controlled at home.   3. HLD: atorvastatin 80 mg qday   4. MDD: zoloft 100 mg qday      Lipid disorders/ASCVD risk (ages >/= 45 or >/= 20 if increased risk ): ordered  DM (>45y yearly or if obese, HTN): A1c ordered  Eye exam: DUE . Pt states she will schedule with o/s doctor  Breast Cancer (40-50y discretion of pt, 50-74y every 1-2 years): Mammogram done 10/24/23   Cervical Cancer (Pap Smear ages 21-65 every 3 years or Pap + HPV q5 years after 30 years of age): utd 2021   Colorectal Cancer (normal risk 50-75yr): Colonoscopy: fit kit done 2020. Prefers cologuard     Vaccines:   Influenza (yearly):    Tetanus (every 10 yrs - 1st tdap): done   PPSV23(>64yo or <65 w/ lung dz, smoking, DM): done      Exercise: none  Diet: regular/diabetic      Review of Systems   Constitutional:  Negative for chills and fever.   HENT:  Negative for congestion and sore throat.    Eyes:  Negative for pain and visual disturbance.   Respiratory:  Negative for cough and shortness of breath.    Cardiovascular:  Negative for chest pain and leg swelling.   Gastrointestinal:  Negative for abdominal pain, nausea and vomiting.   Endocrine: Negative for polydipsia and polyuria.   Genitourinary:  Negative for difficulty urinating and dysuria.   Musculoskeletal:  Negative for arthralgias and back pain.   Skin:  Negative for rash and wound.   Neurological:  Negative for dizziness, weakness and headaches.   Psychiatric/Behavioral:  Negative for agitation and confusion.      Past medical history, surgical history, and family medical history  reviewed and updated as appropriate.    Medications and allergies reviewed.     Objective:     There were no vitals filed for this visit.  There is no height or weight on file to calculate BMI.  Physical Exam  Vitals reviewed.   Constitutional:       General: She is not in acute distress.     Appearance: She is well-developed.   HENT:      Head: Normocephalic and atraumatic.      Right Ear: Tympanic membrane normal.      Left Ear: Tympanic membrane normal.      Mouth/Throat:      Mouth: Mucous membranes are moist.      Pharynx: No oropharyngeal exudate.   Eyes:      General:         Right eye: No discharge.         Left eye: No discharge.      Conjunctiva/sclera: Conjunctivae normal.   Cardiovascular:      Rate and Rhythm: Normal rate and regular rhythm.      Heart sounds: No murmur heard.     No friction rub.   Pulmonary:      Effort: Pulmonary effort is normal.      Breath sounds: Normal breath sounds.   Abdominal:      General: Bowel sounds are normal. There is no distension.      Palpations: Abdomen is soft.      Tenderness: There is no abdominal tenderness. There is no guarding.   Musculoskeletal:         General: Normal range of motion.      Cervical back: Normal range of motion and neck supple.      Right lower leg: No edema.      Left lower leg: No edema.   Lymphadenopathy:      Cervical: No cervical adenopathy.   Skin:     General: Skin is warm and dry.   Neurological:      Mental Status: She is alert and oriented to person, place, and time.   Psychiatric:         Mood and Affect: Mood normal.         Behavior: Behavior normal.         Lab Results   Component Value Date    WBC 6.68 04/27/2024    HGB 13.8 04/27/2024    HCT 42.6 04/27/2024     04/27/2024    CHOL 177 04/27/2024    TRIG 105 04/27/2024    HDL 47 04/27/2024    ALT 18 04/27/2024    AST 17 04/27/2024     04/27/2024    K 4.9 04/27/2024     04/27/2024    CREATININE 0.8 04/27/2024    BUN 16 04/27/2024    CO2 23 04/27/2024    TSH  2.476 04/27/2024    INR 0.9 12/30/2020    HGBA1C 6.8 (H) 04/27/2024       Assessment:     1. Annual physical exam    2. Type 2 diabetes mellitus with hyperglycemia, with long-term current use of insulin    3. Hypertension associated with diabetes    4. Hyperlipidemia associated with type 2 diabetes mellitus    5. Overweight (BMI 25.0-29.9)    6. Screening for colorectal cancer    7. Recurrent major depressive disorder, remission status unspecified    8. Depression, unspecified depression type      Plan:   Rayne was seen today for annual exam.    Diagnoses and all orders for this visit:    Annual physical exam    Type 2 diabetes mellitus with hyperglycemia, with long-term current use of insulin  -     Hemoglobin A1C; Future  -     Comprehensive Metabolic Panel; Future  -     Lipid Panel; Future  -     amLODIPine (NORVASC) 2.5 MG tablet; Take 1 tablet (2.5 mg total) by mouth once daily.  -     atorvastatin (LIPITOR) 80 MG tablet; Take 1 tablet (80 mg total) by mouth every evening.  -     insulin glargine, TOUJEO, (TOUJEO SOLOSTAR U-300 INSULIN) 300 unit/mL (1.5 mL) InPn pen; Inject 10 Units into the skin once daily.  -     metFORMIN (GLUCOPHAGE) 1000 MG tablet; Take 1 tablet (1,000 mg total) by mouth 2 (two) times daily with meals.    Hypertension associated with diabetes  . Reports bp is well controlled at home. Will get readings in 1 week   -     Hemoglobin A1C; Future  -     Comprehensive Metabolic Panel; Future  -     Lipid Panel; Future  -     valsartan (DIOVAN) 320 MG tablet; Take 1 tablet (320 mg total) by mouth once daily.    Hyperlipidemia associated with type 2 diabetes mellitus  -     Hemoglobin A1C; Future  -     Comprehensive Metabolic Panel; Future  -     Lipid Panel; Future    Overweight (BMI 25.0-29.9)  -     Hemoglobin A1C; Future  -     Comprehensive Metabolic Panel; Future  -     Lipid Panel; Future    Screening for colorectal cancer  -     Cologuard Screening (Multitarget Stool DNA); Future  -      Cologuard Screening (Multitarget Stool DNA)    Recurrent major depressive disorder, remission status unspecified  -     Hemoglobin A1C; Future  -     Comprehensive Metabolic Panel; Future  -     Lipid Panel; Future  -     sertraline (ZOLOFT) 100 MG tablet; Take 1 tablet (100 mg total) by mouth once daily.          Rtc 3 months     Deb Ware MD  Internal Medicine    4/30/2024

## 2024-05-17 ENCOUNTER — PATIENT OUTREACH (OUTPATIENT)
Dept: ADMINISTRATIVE | Facility: HOSPITAL | Age: 59
End: 2024-05-17
Payer: COMMERCIAL

## 2024-05-27 LAB — NONINV COLON CA DNA+OCC BLD SCRN STL QL: NEGATIVE

## 2024-05-28 ENCOUNTER — PATIENT OUTREACH (OUTPATIENT)
Dept: ADMINISTRATIVE | Facility: HOSPITAL | Age: 59
End: 2024-05-28
Payer: COMMERCIAL

## 2024-06-03 ENCOUNTER — PATIENT MESSAGE (OUTPATIENT)
Dept: INTERNAL MEDICINE | Facility: CLINIC | Age: 59
End: 2024-06-03
Payer: COMMERCIAL

## 2024-06-03 NOTE — TELEPHONE ENCOUNTER
The patient received a jury summons and is requesting a letter to be excused.    States; It is still hard for me to see and drive that early morning traffic.     Please advise.

## 2024-06-05 ENCOUNTER — PATIENT OUTREACH (OUTPATIENT)
Dept: ADMINISTRATIVE | Facility: HOSPITAL | Age: 59
End: 2024-06-05
Payer: COMMERCIAL

## 2024-07-19 DIAGNOSIS — Z79.4 TYPE 2 DIABETES MELLITUS WITH HYPERGLYCEMIA, WITH LONG-TERM CURRENT USE OF INSULIN: ICD-10-CM

## 2024-07-19 DIAGNOSIS — E11.65 TYPE 2 DIABETES MELLITUS WITH HYPERGLYCEMIA, WITH LONG-TERM CURRENT USE OF INSULIN: ICD-10-CM

## 2024-07-19 NOTE — TELEPHONE ENCOUNTER
No care due was identified.  [facility] Embedded Care Due Messages. Reference number: [id number].   7/19/2024 8:55:50 AM CDT

## 2024-07-20 ENCOUNTER — LAB VISIT (OUTPATIENT)
Dept: LAB | Facility: HOSPITAL | Age: 59
End: 2024-07-20
Attending: HOSPITALIST
Payer: COMMERCIAL

## 2024-07-20 DIAGNOSIS — F33.9 RECURRENT MAJOR DEPRESSIVE DISORDER, REMISSION STATUS UNSPECIFIED: ICD-10-CM

## 2024-07-20 DIAGNOSIS — E11.65 TYPE 2 DIABETES MELLITUS WITH HYPERGLYCEMIA, WITH LONG-TERM CURRENT USE OF INSULIN: ICD-10-CM

## 2024-07-20 DIAGNOSIS — I15.2 HYPERTENSION ASSOCIATED WITH DIABETES: ICD-10-CM

## 2024-07-20 DIAGNOSIS — E66.3 OVERWEIGHT (BMI 25.0-29.9): ICD-10-CM

## 2024-07-20 DIAGNOSIS — Z79.4 TYPE 2 DIABETES MELLITUS WITH HYPERGLYCEMIA, WITH LONG-TERM CURRENT USE OF INSULIN: ICD-10-CM

## 2024-07-20 DIAGNOSIS — E11.69 HYPERLIPIDEMIA ASSOCIATED WITH TYPE 2 DIABETES MELLITUS: ICD-10-CM

## 2024-07-20 DIAGNOSIS — E11.59 HYPERTENSION ASSOCIATED WITH DIABETES: ICD-10-CM

## 2024-07-20 DIAGNOSIS — E78.5 HYPERLIPIDEMIA ASSOCIATED WITH TYPE 2 DIABETES MELLITUS: ICD-10-CM

## 2024-07-20 LAB
ALBUMIN SERPL BCP-MCNC: 3.8 G/DL (ref 3.5–5.2)
ALP SERPL-CCNC: 69 U/L (ref 55–135)
ALT SERPL W/O P-5'-P-CCNC: 18 U/L (ref 10–44)
ANION GAP SERPL CALC-SCNC: 7 MMOL/L (ref 8–16)
AST SERPL-CCNC: 19 U/L (ref 10–40)
BILIRUB SERPL-MCNC: 0.4 MG/DL (ref 0.1–1)
BUN SERPL-MCNC: 17 MG/DL (ref 6–20)
CALCIUM SERPL-MCNC: 9.5 MG/DL (ref 8.7–10.5)
CHLORIDE SERPL-SCNC: 111 MMOL/L (ref 95–110)
CHOLEST SERPL-MCNC: 182 MG/DL (ref 120–199)
CHOLEST/HDLC SERPL: 3.5 {RATIO} (ref 2–5)
CO2 SERPL-SCNC: 25 MMOL/L (ref 23–29)
CREAT SERPL-MCNC: 0.8 MG/DL (ref 0.5–1.4)
EST. GFR  (NO RACE VARIABLE): >60 ML/MIN/1.73 M^2
ESTIMATED AVG GLUCOSE: 151 MG/DL (ref 68–131)
GLUCOSE SERPL-MCNC: 135 MG/DL (ref 70–110)
HBA1C MFR BLD: 6.9 % (ref 4–5.6)
HDLC SERPL-MCNC: 52 MG/DL (ref 40–75)
HDLC SERPL: 28.6 % (ref 20–50)
LDLC SERPL CALC-MCNC: 113 MG/DL (ref 63–159)
NONHDLC SERPL-MCNC: 130 MG/DL
POTASSIUM SERPL-SCNC: 4.8 MMOL/L (ref 3.5–5.1)
PROT SERPL-MCNC: 6.8 G/DL (ref 6–8.4)
SODIUM SERPL-SCNC: 143 MMOL/L (ref 136–145)
TRIGL SERPL-MCNC: 85 MG/DL (ref 30–150)

## 2024-07-20 PROCEDURE — 80053 COMPREHEN METABOLIC PANEL: CPT | Performed by: HOSPITALIST

## 2024-07-20 PROCEDURE — 80061 LIPID PANEL: CPT | Performed by: HOSPITALIST

## 2024-07-20 PROCEDURE — 83036 HEMOGLOBIN GLYCOSYLATED A1C: CPT | Performed by: HOSPITALIST

## 2024-07-20 PROCEDURE — 36415 COLL VENOUS BLD VENIPUNCTURE: CPT | Mod: PO | Performed by: HOSPITALIST

## 2024-07-21 RX ORDER — TIRZEPATIDE 7.5 MG/.5ML
7.5 INJECTION, SOLUTION SUBCUTANEOUS
Qty: 4 PEN | Refills: 3 | Status: SHIPPED | OUTPATIENT
Start: 2024-07-21

## 2024-07-24 ENCOUNTER — PATIENT OUTREACH (OUTPATIENT)
Dept: ADMINISTRATIVE | Facility: HOSPITAL | Age: 59
End: 2024-07-24
Payer: COMMERCIAL

## 2024-07-24 DIAGNOSIS — Z12.31 ENCOUNTER FOR SCREENING MAMMOGRAM FOR MALIGNANT NEOPLASM OF BREAST: Primary | ICD-10-CM

## 2024-07-24 NOTE — PROGRESS NOTES
Population Health Chart Review & Patient Outreach Details      Additional [facility] Health Notes:               Updates Requested / Reviewed:      Care Everywhere, , and Immunizations Reconciliation Completed or Queried: [place]         Health Maintenance Topics Overdue:      [facility] Score: 3     Eye Exam  Foot Exam  Uncontrolled BP    Shingles/Zoster Vaccine  RSV Vaccine                  Health Maintenance Topic(s) Outreach Outcomes & Actions Taken:    Primary Care Appt - Outreach Outcomes & Actions Taken  : Primary Care Appt Scheduled    Breast Cancer Screening - Outreach Outcomes & Actions Taken  : Mammogram Order Placed    Colorectal Cancer Screening - Outreach Outcomes & Actions Taken  : Updated patient preferred screening

## 2024-10-01 ENCOUNTER — PATIENT MESSAGE (OUTPATIENT)
Dept: INTERNAL MEDICINE | Facility: CLINIC | Age: 59
End: 2024-10-01
Payer: COMMERCIAL

## 2024-10-24 ENCOUNTER — PATIENT OUTREACH (OUTPATIENT)
Dept: ADMINISTRATIVE | Facility: HOSPITAL | Age: 59
End: 2024-10-24
Payer: COMMERCIAL

## 2025-01-22 DIAGNOSIS — E11.9 TYPE 2 DIABETES MELLITUS WITHOUT COMPLICATION: ICD-10-CM

## 2025-03-06 ENCOUNTER — PATIENT OUTREACH (OUTPATIENT)
Dept: ADMINISTRATIVE | Facility: HOSPITAL | Age: 60
End: 2025-03-06
Payer: COMMERCIAL

## 2025-03-26 ENCOUNTER — PATIENT MESSAGE (OUTPATIENT)
Dept: INTERNAL MEDICINE | Facility: CLINIC | Age: 60
End: 2025-03-26
Payer: COMMERCIAL

## 2025-04-20 ENCOUNTER — PATIENT MESSAGE (OUTPATIENT)
Dept: ADMINISTRATIVE | Facility: HOSPITAL | Age: 60
End: 2025-04-20
Payer: COMMERCIAL

## 2025-06-06 ENCOUNTER — PATIENT OUTREACH (OUTPATIENT)
Dept: ADMINISTRATIVE | Facility: HOSPITAL | Age: 60
End: 2025-06-06
Payer: COMMERCIAL

## 2025-06-23 ENCOUNTER — PATIENT MESSAGE (OUTPATIENT)
Dept: ADMINISTRATIVE | Facility: HOSPITAL | Age: 60
End: 2025-06-23
Payer: COMMERCIAL

## 2025-07-30 ENCOUNTER — HOSPITAL ENCOUNTER (OUTPATIENT)
Dept: RADIOLOGY | Facility: HOSPITAL | Age: 60
Discharge: HOME | End: 2025-07-30
Attending: HOSPITALIST
Payer: COMMERCIAL

## 2025-07-30 DIAGNOSIS — M25.531 RIGHT WRIST PAIN: ICD-10-CM

## 2025-07-30 PROCEDURE — 73130 X-RAY EXAM OF HAND: CPT | Mod: TC,PO,RT

## 2025-07-30 PROCEDURE — 73130 X-RAY EXAM OF HAND: CPT | Mod: 26,RT,, | Performed by: RADIOLOGY

## 2025-08-04 ENCOUNTER — LAB VISIT (OUTPATIENT)
Dept: LAB | Facility: HOSPITAL | Age: 60
End: 2025-08-04
Attending: HOSPITALIST
Payer: COMMERCIAL

## 2025-08-04 DIAGNOSIS — Z79.4 TYPE 2 DIABETES MELLITUS WITH HYPERGLYCEMIA, WITH LONG-TERM CURRENT USE OF INSULIN: ICD-10-CM

## 2025-08-04 DIAGNOSIS — E11.59 HYPERTENSION ASSOCIATED WITH DIABETES: ICD-10-CM

## 2025-08-04 DIAGNOSIS — I15.2 HYPERTENSION ASSOCIATED WITH DIABETES: ICD-10-CM

## 2025-08-04 DIAGNOSIS — E11.69 HYPERLIPIDEMIA ASSOCIATED WITH TYPE 2 DIABETES MELLITUS: ICD-10-CM

## 2025-08-04 DIAGNOSIS — Z00.00 ANNUAL PHYSICAL EXAM: ICD-10-CM

## 2025-08-04 DIAGNOSIS — E11.65 TYPE 2 DIABETES MELLITUS WITH HYPERGLYCEMIA, WITH LONG-TERM CURRENT USE OF INSULIN: ICD-10-CM

## 2025-08-04 DIAGNOSIS — E66.3 OVERWEIGHT (BMI 25.0-29.9): ICD-10-CM

## 2025-08-04 DIAGNOSIS — E78.5 HYPERLIPIDEMIA ASSOCIATED WITH TYPE 2 DIABETES MELLITUS: ICD-10-CM

## 2025-08-04 DIAGNOSIS — F33.9 RECURRENT MAJOR DEPRESSIVE DISORDER, REMISSION STATUS UNSPECIFIED: ICD-10-CM

## 2025-08-04 LAB
ABSOLUTE EOSINOPHIL (OHS): 0.16 K/UL
ABSOLUTE MONOCYTE (OHS): 0.45 K/UL (ref 0.3–1)
ABSOLUTE NEUTROPHIL COUNT (OHS): 4.32 K/UL (ref 1.8–7.7)
ALBUMIN SERPL BCP-MCNC: 4 G/DL (ref 3.5–5.2)
ALBUMIN/CREAT UR: 8.4 UG/MG
ALP SERPL-CCNC: 88 UNIT/L (ref 40–150)
ALT SERPL W/O P-5'-P-CCNC: 11 UNIT/L (ref 0–55)
ANION GAP (OHS): 11 MMOL/L (ref 8–16)
AST SERPL-CCNC: 16 UNIT/L (ref 0–50)
BACTERIA #/AREA URNS AUTO: ABNORMAL /HPF
BASOPHILS # BLD AUTO: 0.04 K/UL
BASOPHILS NFR BLD AUTO: 0.6 %
BILIRUB SERPL-MCNC: 0.5 MG/DL (ref 0.1–1)
BILIRUB UR QL STRIP.AUTO: NEGATIVE
BUN SERPL-MCNC: 21 MG/DL (ref 8–23)
CALCIUM SERPL-MCNC: 9.4 MG/DL (ref 8.7–10.5)
CHLORIDE SERPL-SCNC: 104 MMOL/L (ref 95–110)
CHOLEST SERPL-MCNC: 205 MG/DL (ref 120–199)
CHOLEST/HDLC SERPL: 4.4 {RATIO} (ref 2–5)
CLARITY UR: CLEAR
CO2 SERPL-SCNC: 22 MMOL/L (ref 23–29)
COLOR UR AUTO: YELLOW
CREAT SERPL-MCNC: 0.8 MG/DL (ref 0.5–1.4)
CREAT UR-MCNC: 143 MG/DL (ref 15–325)
EAG (OHS): ABNORMAL
ERYTHROCYTE [DISTWIDTH] IN BLOOD BY AUTOMATED COUNT: 12.3 % (ref 11.5–14.5)
GFR SERPLBLD CREATININE-BSD FMLA CKD-EPI: >60 ML/MIN/1.73/M2
GLUCOSE SERPL-MCNC: 273 MG/DL (ref 70–110)
GLUCOSE UR QL STRIP: ABNORMAL
HBA1C MFR BLD: >14 % (ref 4–5.6)
HCT VFR BLD AUTO: 43.9 % (ref 37–48.5)
HDLC SERPL-MCNC: 47 MG/DL (ref 40–75)
HDLC SERPL: 22.9 % (ref 20–50)
HGB BLD-MCNC: 14.3 GM/DL (ref 12–16)
HGB UR QL STRIP: NEGATIVE
HYALINE CASTS UR QL AUTO: 3 /LPF (ref 0–1)
IMM GRANULOCYTES # BLD AUTO: 0.01 K/UL (ref 0–0.04)
IMM GRANULOCYTES NFR BLD AUTO: 0.2 % (ref 0–0.5)
KETONES UR QL STRIP: NEGATIVE
LDLC SERPL CALC-MCNC: 125.8 MG/DL (ref 63–159)
LEUKOCYTE ESTERASE UR QL STRIP: NEGATIVE
LYMPHOCYTES # BLD AUTO: 1.39 K/UL (ref 1–4.8)
MCH RBC QN AUTO: 29.9 PG (ref 27–31)
MCHC RBC AUTO-ENTMCNC: 32.6 G/DL (ref 32–36)
MCV RBC AUTO: 92 FL (ref 82–98)
MICROALBUMIN UR-MCNC: 12 UG/ML (ref ?–5000)
MICROSCOPIC COMMENT: ABNORMAL
NITRITE UR QL STRIP: NEGATIVE
NONHDLC SERPL-MCNC: 158 MG/DL
NUCLEATED RBC (/100WBC) (OHS): 0 /100 WBC
PH UR STRIP: 6 [PH]
PLATELET # BLD AUTO: 237 K/UL (ref 150–450)
PMV BLD AUTO: 11.9 FL (ref 9.2–12.9)
POTASSIUM SERPL-SCNC: 4.3 MMOL/L (ref 3.5–5.1)
PROT SERPL-MCNC: 7.1 GM/DL (ref 6–8.4)
PROT UR QL STRIP: NEGATIVE
RBC # BLD AUTO: 4.79 M/UL (ref 4–5.4)
RBC #/AREA URNS AUTO: <1 /HPF (ref 0–4)
RELATIVE EOSINOPHIL (OHS): 2.5 %
RELATIVE LYMPHOCYTE (OHS): 21.8 % (ref 18–48)
RELATIVE MONOCYTE (OHS): 7.1 % (ref 4–15)
RELATIVE NEUTROPHIL (OHS): 67.8 % (ref 38–73)
SODIUM SERPL-SCNC: 137 MMOL/L (ref 136–145)
SP GR UR STRIP: 1.03
SQUAMOUS #/AREA URNS AUTO: 2 /HPF
TRIGL SERPL-MCNC: 161 MG/DL (ref 30–150)
TSH SERPL-ACNC: 1.67 UIU/ML (ref 0.4–4)
UROBILINOGEN UR STRIP-ACNC: NEGATIVE EU/DL
WBC # BLD AUTO: 6.37 K/UL (ref 3.9–12.7)
WBC #/AREA URNS AUTO: 1 /HPF (ref 0–5)
YEAST UR QL AUTO: ABNORMAL /HPF

## 2025-08-04 PROCEDURE — 36415 COLL VENOUS BLD VENIPUNCTURE: CPT | Mod: PO

## 2025-08-04 PROCEDURE — 85025 COMPLETE CBC W/AUTO DIFF WBC: CPT

## 2025-08-04 PROCEDURE — 82040 ASSAY OF SERUM ALBUMIN: CPT

## 2025-08-04 PROCEDURE — 80061 LIPID PANEL: CPT

## 2025-08-04 PROCEDURE — 81003 URINALYSIS AUTO W/O SCOPE: CPT

## 2025-08-04 PROCEDURE — 84443 ASSAY THYROID STIM HORMONE: CPT

## 2025-08-04 PROCEDURE — 82043 UR ALBUMIN QUANTITATIVE: CPT

## 2025-08-04 PROCEDURE — 83036 HEMOGLOBIN GLYCOSYLATED A1C: CPT

## 2025-08-13 ENCOUNTER — OFFICE VISIT (OUTPATIENT)
Facility: CLINIC | Age: 60
End: 2025-08-13
Payer: COMMERCIAL

## 2025-08-13 DIAGNOSIS — M18.11 ARTHRITIS OF CARPOMETACARPAL (CMC) JOINT OF RIGHT THUMB: Primary | ICD-10-CM

## 2025-08-13 DIAGNOSIS — M25.531 RIGHT WRIST PAIN: ICD-10-CM

## 2025-08-13 PROCEDURE — 99205 OFFICE O/P NEW HI 60 MIN: CPT | Mod: S$GLB,,,

## 2025-08-13 PROCEDURE — 3061F NEG MICROALBUMINURIA REV: CPT | Mod: CPTII,S$GLB,,

## 2025-08-13 PROCEDURE — 3046F HEMOGLOBIN A1C LEVEL >9.0%: CPT | Mod: CPTII,S$GLB,,

## 2025-08-13 PROCEDURE — 99999 PR PBB SHADOW E&M-EST. PATIENT-LVL III: CPT | Mod: PBBFAC,,,

## 2025-08-13 PROCEDURE — 1160F RVW MEDS BY RX/DR IN RCRD: CPT | Mod: CPTII,S$GLB,,

## 2025-08-13 PROCEDURE — 3066F NEPHROPATHY DOC TX: CPT | Mod: CPTII,S$GLB,,

## 2025-08-13 PROCEDURE — 1159F MED LIST DOCD IN RCRD: CPT | Mod: CPTII,S$GLB,,

## 2025-08-13 PROCEDURE — 4010F ACE/ARB THERAPY RXD/TAKEN: CPT | Mod: CPTII,S$GLB,,

## 2025-08-14 ENCOUNTER — PATIENT OUTREACH (OUTPATIENT)
Dept: ADMINISTRATIVE | Facility: HOSPITAL | Age: 60
End: 2025-08-14
Payer: COMMERCIAL

## 2025-08-29 ENCOUNTER — PATIENT OUTREACH (OUTPATIENT)
Dept: ADMINISTRATIVE | Facility: HOSPITAL | Age: 60
End: 2025-08-29
Payer: COMMERCIAL

## 2025-09-04 ENCOUNTER — PATIENT MESSAGE (OUTPATIENT)
Dept: INTERNAL MEDICINE | Facility: CLINIC | Age: 60
End: 2025-09-04
Payer: COMMERCIAL